# Patient Record
Sex: FEMALE | Race: WHITE | Employment: FULL TIME | URBAN - METROPOLITAN AREA
[De-identification: names, ages, dates, MRNs, and addresses within clinical notes are randomized per-mention and may not be internally consistent; named-entity substitution may affect disease eponyms.]

---

## 2017-07-21 LAB
ALBUMIN SERPL-MCNC: 4.4 G/DL
ALP BLD-CCNC: 37 U/L
ALT SERPL-CCNC: 14 U/L
ANION GAP SERPL CALCULATED.3IONS-SCNC: 1.6 MMOL/L
AST SERPL-CCNC: 16 U/L
AVERAGE GLUCOSE: NORMAL
BASOPHILS ABSOLUTE: 20 /ΜL
BASOPHILS RELATIVE PERCENT: 0 %
BILIRUB SERPL-MCNC: 0.6 MG/DL (ref 0.1–1.4)
BILIRUBIN, URINE: NORMAL
BLOOD, URINE: NORMAL
BUN BLDV-MCNC: 19 MG/DL
CALCIUM SERPL-MCNC: 9.5 MG/DL
CHLORIDE BLD-SCNC: 97 MMOL/L
CHOLESTEROL, TOTAL: 186 MG/DL
CHOLESTEROL/HDL RATIO: 3.2
CLARITY: NORMAL
CO2: 29 MMOL/L
COLOR: NORMAL
CREAT SERPL-MCNC: 1.16 MG/DL
EOSINOPHILS ABSOLUTE: 100 /ΜL
EOSINOPHILS RELATIVE PERCENT: 2 %
GFR CALCULATED: NORMAL
GLUCOSE BLD-MCNC: 81 MG/DL
GLUCOSE URINE: NORMAL
HBA1C MFR BLD: 5.2 %
HCT VFR BLD CALC: 40.9 % (ref 36–46)
HDLC SERPL-MCNC: 58 MG/DL (ref 35–70)
HEMOGLOBIN: 13.2 G/DL (ref 12–16)
KETONES, URINE: NORMAL
LDL CHOLESTEROL CALCULATED: 100 MG/DL (ref 0–160)
LEUKOCYTE ESTERASE, URINE: NORMAL
LYMPHOCYTES ABSOLUTE: 1370 /ΜL
LYMPHOCYTES RELATIVE PERCENT: 29 %
MCH RBC QN AUTO: 29.7 PG
MCHC RBC AUTO-ENTMCNC: 32.2 G/DL
MCV RBC AUTO: 92.2 FL
MONOCYTES ABSOLUTE: 390 /ΜL
MONOCYTES RELATIVE PERCENT: 8 %
NEUTROPHILS ABSOLUTE: 2780 /ΜL
NEUTROPHILS RELATIVE PERCENT: 60 %
NITRITE, URINE: NORMAL
PDW BLD-RTO: 15.1 %
PH UA: NORMAL (ref 4.5–8)
PLATELET # BLD: 181 K/ΜL
PMV BLD AUTO: 9.5 FL
POTASSIUM SERPL-SCNC: 4.1 MMOL/L
PROTEIN UA: NORMAL
RBC # BLD: 4.43 10^6/ΜL
SODIUM BLD-SCNC: 133 MMOL/L
SPECIFIC GRAVITY, URINE: NORMAL
TOTAL PROTEIN: 7
TRIGL SERPL-MCNC: 142 MG/DL
TSH SERPL DL<=0.05 MIU/L-ACNC: 3.63 UIU/ML
URIC ACID: 4.5
UROBILINOGEN, URINE: NORMAL
VITAMIN B-12: 1781
VITAMIN D 25-HYDROXY: NORMAL
VITAMIN D2, 25 HYDROXY: NORMAL
VITAMIN D3,25 HYDROXY: NORMAL
VLDLC SERPL CALC-MCNC: NORMAL MG/DL
WBC # BLD: 4.7 10^3/ML

## 2018-03-23 ENCOUNTER — HOSPITAL ENCOUNTER (OUTPATIENT)
Dept: GENERAL RADIOLOGY | Age: 53
Discharge: HOME | End: 2018-03-25
Payer: COMMERCIAL

## 2018-03-23 DIAGNOSIS — Z85.3 PERSONAL HISTORY OF BREAST CANCER: ICD-10-CM

## 2018-03-23 PROCEDURE — 77066 DX MAMMO INCL CAD BI: CPT

## 2018-04-04 RX ORDER — CITALOPRAM 20 MG/1
20 TABLET ORAL DAILY
Qty: 45 TABLET | Refills: 0 | Status: SHIPPED | OUTPATIENT
Start: 2018-04-04 | End: 2018-05-07 | Stop reason: SDUPTHER

## 2018-05-07 RX ORDER — CITALOPRAM 20 MG/1
20 TABLET ORAL DAILY
Qty: 45 TABLET | Refills: 0 | Status: SHIPPED | OUTPATIENT
Start: 2018-05-07 | End: 2018-06-07 | Stop reason: SDUPTHER

## 2018-06-07 RX ORDER — CITALOPRAM 20 MG/1
TABLET ORAL
Qty: 45 TABLET | Refills: 1 | Status: SHIPPED | OUTPATIENT
Start: 2018-06-07 | End: 2018-08-02 | Stop reason: SDUPTHER

## 2018-06-14 DIAGNOSIS — E78.5 HYPERLIPIDEMIA, UNSPECIFIED HYPERLIPIDEMIA TYPE: ICD-10-CM

## 2018-06-14 DIAGNOSIS — E03.9 ACQUIRED HYPOTHYROIDISM: ICD-10-CM

## 2018-06-14 DIAGNOSIS — Z00.00 ROUTINE GENERAL MEDICAL EXAMINATION AT A HEALTH CARE FACILITY: ICD-10-CM

## 2018-06-14 DIAGNOSIS — E55.9 VITAMIN D DEFICIENCY: ICD-10-CM

## 2018-06-14 DIAGNOSIS — R53.83 FATIGUE, UNSPECIFIED TYPE: Primary | ICD-10-CM

## 2018-06-14 DIAGNOSIS — Z79.899 ENCOUNTER FOR LONG-TERM (CURRENT) USE OF MEDICATIONS: ICD-10-CM

## 2018-06-14 DIAGNOSIS — Z85.3 PERSONAL HISTORY OF MALIGNANT NEOPLASM OF BREAST: ICD-10-CM

## 2018-07-13 ENCOUNTER — TELEPHONE (OUTPATIENT)
Dept: PRIMARY CARE CLINIC | Age: 53
End: 2018-07-13

## 2018-07-13 NOTE — TELEPHONE ENCOUNTER
Patient requesting her lab order be faxed to [facility] at [phone].   Request was successfully faxed today at 8:28am

## 2018-07-17 DIAGNOSIS — M79.18 MYOFACIAL MUSCLE PAIN: ICD-10-CM

## 2018-07-17 LAB
ALBUMIN SERPL-MCNC: 4.3 G/DL
ALP BLD-CCNC: 58 U/L
ALT SERPL-CCNC: 22 U/L
ANION GAP SERPL CALCULATED.3IONS-SCNC: NORMAL MMOL/L
AST SERPL-CCNC: 23 U/L
AVERAGE GLUCOSE: NORMAL
BASOPHILS ABSOLUTE: 0 /ΜL
BASOPHILS RELATIVE PERCENT: 1 %
BILIRUB SERPL-MCNC: 0.4 MG/DL (ref 0.1–1.4)
BILIRUBIN, URINE: NEGATIVE
BLOOD, URINE: NEGATIVE
BUN BLDV-MCNC: 16 MG/DL
CALCIUM SERPL-MCNC: 9.5 MG/DL
CHLORIDE BLD-SCNC: 103 MMOL/L
CHOLESTEROL, TOTAL: 196 MG/DL
CHOLESTEROL/HDL RATIO: NORMAL
CLARITY: CLEAR
CO2: 28 MMOL/L
COLOR: YELLOW
CREAT SERPL-MCNC: 1.08 MG/DL
EOSINOPHILS ABSOLUTE: 0.1 /ΜL
EOSINOPHILS RELATIVE PERCENT: 3 %
GFR CALCULATED: NORMAL
GLUCOSE BLD-MCNC: 87 MG/DL
GLUCOSE URINE: NEGATIVE
HBA1C MFR BLD: 5.4 %
HCT VFR BLD CALC: 40 % (ref 36–46)
HDLC SERPL-MCNC: 52 MG/DL (ref 35–70)
HEMOGLOBIN: 13.1 G/DL (ref 12–16)
KETONES, URINE: NEGATIVE
LDL CHOLESTEROL CALCULATED: 121 MG/DL (ref 0–160)
LEUKOCYTE ESTERASE, URINE: NEGATIVE
LYMPHOCYTES ABSOLUTE: 1.3 /ΜL
LYMPHOCYTES RELATIVE PERCENT: 33 %
MCH RBC QN AUTO: 29.2 PG
MCHC RBC AUTO-ENTMCNC: 32.8 G/DL
MCV RBC AUTO: 89 FL
MONOCYTES ABSOLUTE: 0.3 /ΜL
MONOCYTES RELATIVE PERCENT: 8 %
NEUTROPHILS ABSOLUTE: 2.2 /ΜL
NEUTROPHILS RELATIVE PERCENT: 55 %
NITRITE, URINE: NEGATIVE
PDW BLD-RTO: 14 %
PH UA: 7 (ref 4.5–8)
PLATELET # BLD: 227 K/ΜL
PMV BLD AUTO: NORMAL FL
POTASSIUM SERPL-SCNC: 4.5 MMOL/L
PROTEIN UA: NEGATIVE
RBC # BLD: 4.49 10^6/ΜL
SODIUM BLD-SCNC: 143 MMOL/L
SPECIFIC GRAVITY, URINE: 1.02
TOTAL PROTEIN: 6.9
TRIGL SERPL-MCNC: 117 MG/DL
TSH SERPL DL<=0.05 MIU/L-ACNC: 3 UIU/ML
URIC ACID: 3.9
UROBILINOGEN, URINE: NORMAL
VLDLC SERPL CALC-MCNC: 23 MG/DL
WBC # BLD: 4 10^3/ML

## 2018-07-26 ENCOUNTER — OFFICE VISIT (OUTPATIENT)
Dept: PRIMARY CARE CLINIC | Age: 53
End: 2018-07-26
Payer: COMMERCIAL

## 2018-07-26 VITALS
DIASTOLIC BLOOD PRESSURE: 66 MMHG | HEART RATE: 70 BPM | RESPIRATION RATE: 18 BRPM | OXYGEN SATURATION: 97 % | BODY MASS INDEX: 24.11 KG/M2 | HEIGHT: 66 IN | WEIGHT: 150 LBS | SYSTOLIC BLOOD PRESSURE: 98 MMHG | TEMPERATURE: 98.7 F

## 2018-07-26 DIAGNOSIS — E55.9 VITAMIN D DEFICIENCY: ICD-10-CM

## 2018-07-26 DIAGNOSIS — C50.912 MALIGNANT NEOPLASM OF LEFT BREAST IN FEMALE, ESTROGEN RECEPTOR POSITIVE, UNSPECIFIED SITE OF BREAST (HCC): Primary | ICD-10-CM

## 2018-07-26 DIAGNOSIS — E03.9 ACQUIRED HYPOTHYROIDISM: ICD-10-CM

## 2018-07-26 DIAGNOSIS — E78.5 HYPERLIPIDEMIA, UNSPECIFIED HYPERLIPIDEMIA TYPE: ICD-10-CM

## 2018-07-26 DIAGNOSIS — Z85.3 PERSONAL HISTORY OF MALIGNANT NEOPLASM OF BREAST: ICD-10-CM

## 2018-07-26 DIAGNOSIS — Z00.00 ROUTINE GENERAL MEDICAL EXAMINATION AT A HEALTH CARE FACILITY: ICD-10-CM

## 2018-07-26 DIAGNOSIS — E78.00 PURE HYPERCHOLESTEROLEMIA: ICD-10-CM

## 2018-07-26 DIAGNOSIS — R53.83 FATIGUE, UNSPECIFIED TYPE: ICD-10-CM

## 2018-07-26 DIAGNOSIS — Z79.899 ENCOUNTER FOR LONG-TERM (CURRENT) USE OF MEDICATIONS: ICD-10-CM

## 2018-07-26 DIAGNOSIS — M79.18 MYOFACIAL MUSCLE PAIN: ICD-10-CM

## 2018-07-26 DIAGNOSIS — K57.30 DIVERTICULOSIS OF LARGE INTESTINE WITHOUT HEMORRHAGE: ICD-10-CM

## 2018-07-26 DIAGNOSIS — Z17.0 MALIGNANT NEOPLASM OF LEFT BREAST IN FEMALE, ESTROGEN RECEPTOR POSITIVE, UNSPECIFIED SITE OF BREAST (HCC): Primary | ICD-10-CM

## 2018-07-26 PROCEDURE — 99396 PREV VISIT EST AGE 40-64: CPT | Performed by: INTERNAL MEDICINE

## 2018-07-26 RX ORDER — ANASTROZOLE 1 MG/1
1 TABLET ORAL DAILY
COMMUNITY
End: 2021-10-07

## 2018-07-26 ASSESSMENT — ENCOUNTER SYMPTOMS
STRIDOR: 0
EYE REDNESS: 0
EYE PAIN: 0
DIARRHEA: 0
BLURRED VISION: 0
HEMOPTYSIS: 0
SHORTNESS OF BREATH: 0
DOUBLE VISION: 0
NAUSEA: 0
BLOOD IN STOOL: 0

## 2018-07-26 ASSESSMENT — PATIENT HEALTH QUESTIONNAIRE - PHQ9
SUM OF ALL RESPONSES TO PHQ QUESTIONS 1-9: 0
1. LITTLE INTEREST OR PLEASURE IN DOING THINGS: 0
SUM OF ALL RESPONSES TO PHQ9 QUESTIONS 1 & 2: 0
2. FEELING DOWN, DEPRESSED OR HOPELESS: 0

## 2018-07-26 NOTE — PROGRESS NOTES
studies    Her concern over lipids was discussed she chooses not to use a statin at the present time. We'll check lipid profile and TSH only in 6 months    We'll check complete laboratory workup including lipids and thyroid CMP urinalysis vitamin D in 1 year's time    She'll continue to follow up with consultants noted. Immunizations reviewed patient will have these at the pharmacy      Moderate to high degree of medical decision-making as necessary injury with this patient's questions, or problems, her history, and the physicians that she engages with.

## 2018-07-27 RX ORDER — LEVOTHYROXINE SODIUM 0.05 MG/1
50 TABLET ORAL DAILY
Qty: 90 TABLET | Refills: 3 | Status: SHIPPED | OUTPATIENT
Start: 2018-07-27 | End: 2019-07-25 | Stop reason: SDUPTHER

## 2018-08-02 RX ORDER — CITALOPRAM 20 MG/1
TABLET ORAL
Qty: 45 TABLET | Refills: 1 | Status: SHIPPED | OUTPATIENT
Start: 2018-08-02 | End: 2018-10-04 | Stop reason: SDUPTHER

## 2018-10-04 RX ORDER — CITALOPRAM 20 MG/1
TABLET ORAL
Qty: 45 TABLET | Refills: 1 | Status: SHIPPED | OUTPATIENT
Start: 2018-10-04 | End: 2018-12-03 | Stop reason: SDUPTHER

## 2018-12-03 RX ORDER — CITALOPRAM 20 MG/1
TABLET ORAL
Qty: 45 TABLET | Refills: 3 | Status: SHIPPED | OUTPATIENT
Start: 2018-12-03 | End: 2019-06-05 | Stop reason: SDUPTHER

## 2019-01-30 DIAGNOSIS — E03.9 ACQUIRED HYPOTHYROIDISM: ICD-10-CM

## 2019-01-30 DIAGNOSIS — E78.00 PURE HYPERCHOLESTEROLEMIA: ICD-10-CM

## 2019-01-30 LAB
CHOLESTEROL, TOTAL: 215 MG/DL
CHOLESTEROL/HDL RATIO: NORMAL
HDLC SERPL-MCNC: 51 MG/DL (ref 35–70)
LDL CHOLESTEROL CALCULATED: 135 MG/DL (ref 0–160)
TRIGL SERPL-MCNC: 144 MG/DL
TSH SERPL DL<=0.05 MIU/L-ACNC: 2.86 UIU/ML
VLDLC SERPL CALC-MCNC: 29 MG/DL

## 2019-03-25 ENCOUNTER — HOSPITAL ENCOUNTER (OUTPATIENT)
Dept: GENERAL RADIOLOGY | Age: 54
Discharge: HOME | End: 2019-03-27
Payer: COMMERCIAL

## 2019-03-25 DIAGNOSIS — Z13.9 VISIT FOR SCREENING: ICD-10-CM

## 2019-03-25 DIAGNOSIS — Z13.820 SCREENING FOR OSTEOPOROSIS: ICD-10-CM

## 2019-03-25 PROCEDURE — 77080 DXA BONE DENSITY AXIAL: CPT

## 2019-03-25 PROCEDURE — 77063 BREAST TOMOSYNTHESIS BI: CPT

## 2019-06-02 ENCOUNTER — HOSPITAL ENCOUNTER (EMERGENCY)
Age: 54
Discharge: HOME | End: 2019-06-02
Payer: COMMERCIAL

## 2019-06-02 VITALS
DIASTOLIC BLOOD PRESSURE: 65 MMHG | RESPIRATION RATE: 16 BRPM | TEMPERATURE: 98.7 F | SYSTOLIC BLOOD PRESSURE: 121 MMHG | OXYGEN SATURATION: 94 % | HEART RATE: 63 BPM | HEIGHT: 65 IN | BODY MASS INDEX: 23.32 KG/M2 | WEIGHT: 140 LBS

## 2019-06-02 DIAGNOSIS — Z23 NEED FOR TETANUS BOOSTER: Primary | ICD-10-CM

## 2019-06-02 DIAGNOSIS — S81.012A LACERATION OF LEFT KNEE, INITIAL ENCOUNTER: ICD-10-CM

## 2019-06-02 PROCEDURE — 12002 RPR S/N/AX/GEN/TRNK2.6-7.5CM: CPT

## 2019-06-02 PROCEDURE — 6360000002 HC RX W HCPCS: Performed by: PHYSICIAN ASSISTANT

## 2019-06-02 PROCEDURE — 99282 EMERGENCY DEPT VISIT SF MDM: CPT

## 2019-06-02 PROCEDURE — 6370000000 HC RX 637 (ALT 250 FOR IP): Performed by: PHYSICIAN ASSISTANT

## 2019-06-02 PROCEDURE — 90471 IMMUNIZATION ADMIN: CPT | Performed by: PHYSICIAN ASSISTANT

## 2019-06-02 PROCEDURE — 90715 TDAP VACCINE 7 YRS/> IM: CPT | Performed by: PHYSICIAN ASSISTANT

## 2019-06-02 RX ORDER — GINSENG 100 MG
CAPSULE ORAL ONCE
Status: COMPLETED | OUTPATIENT
Start: 2019-06-02 | End: 2019-06-02

## 2019-06-02 RX ADMIN — TETANUS TOXOID, REDUCED DIPHTHERIA TOXOID AND ACELLULAR PERTUSSIS VACCINE, ADSORBED 0.5 ML: 5; 2.5; 8; 8; 2.5 SUSPENSION INTRAMUSCULAR at 16:42

## 2019-06-02 RX ADMIN — BACITRACIN: 500 OINTMENT TOPICAL at 16:43

## 2019-06-02 NOTE — ED PROVIDER NOTES
Aligned: yes  The wound was closed with 4-0 Prolene using interrupted sutures. Dressing:  bacitracin and a bandage was placed. Total number suture:  4. There were no additional lacerations requiring repair. MDM:   Patient presenting for laceration to her left knee that she sustained just prior to arrival. Wound closed nicely with 4 stitches. There were superficial lacerations to the lateral sides of her closed with adhesive. We discussed proper wound care. We discussed topical antibiotic ointment and prevention of infection. We discussed signs of infection and reasons to return. They understood to have the sutures removed in 7-10 days. Counseling: The emergency provider has spoken with the patient and discussed todays results, in addition to providing specific details for the plan of care and counseling regarding the diagnosis and prognosis. Questions are answered at this time and they are agreeable with the plan. Assessment      1. Need for tetanus booster    2. Laceration of left knee, initial encounter      Plan   Discharge to home  Patient condition is stable    New Medications     Discharge Medication List as of 6/2/2019  4:32 PM        Electronically signed by Dionicio Dean PA-C   DD: 6/2/19  **This report was transcribed using voice recognition software. Every effort was made to ensure accuracy; however, inadvertent computerized transcription errors may be present.   END OF ED PROVIDER NOTE        Kwasi Martin PA-C  06/02/19 [id number]

## 2019-06-05 RX ORDER — CITALOPRAM 20 MG/1
TABLET ORAL
Qty: 45 TABLET | Refills: 1 | Status: SHIPPED | OUTPATIENT
Start: 2019-06-05 | End: 2019-07-25 | Stop reason: SDUPTHER

## 2019-07-25 ENCOUNTER — OFFICE VISIT (OUTPATIENT)
Dept: PRIMARY CARE CLINIC | Age: 54
End: 2019-07-25
Payer: COMMERCIAL

## 2019-07-25 VITALS
SYSTOLIC BLOOD PRESSURE: 122 MMHG | HEART RATE: 90 BPM | OXYGEN SATURATION: 99 % | HEIGHT: 65 IN | DIASTOLIC BLOOD PRESSURE: 70 MMHG | WEIGHT: 155 LBS | BODY MASS INDEX: 25.83 KG/M2 | RESPIRATION RATE: 16 BRPM | TEMPERATURE: 98.7 F

## 2019-07-25 DIAGNOSIS — E03.9 ACQUIRED HYPOTHYROIDISM: ICD-10-CM

## 2019-07-25 DIAGNOSIS — E55.9 VITAMIN D DEFICIENCY: ICD-10-CM

## 2019-07-25 DIAGNOSIS — E78.00 PURE HYPERCHOLESTEROLEMIA: ICD-10-CM

## 2019-07-25 DIAGNOSIS — Z17.0 MALIGNANT NEOPLASM OF LEFT BREAST IN FEMALE, ESTROGEN RECEPTOR POSITIVE, UNSPECIFIED SITE OF BREAST (HCC): Primary | ICD-10-CM

## 2019-07-25 DIAGNOSIS — C50.912 MALIGNANT NEOPLASM OF LEFT BREAST IN FEMALE, ESTROGEN RECEPTOR POSITIVE, UNSPECIFIED SITE OF BREAST (HCC): Primary | ICD-10-CM

## 2019-07-25 DIAGNOSIS — K57.30 DIVERTICULOSIS OF LARGE INTESTINE WITHOUT HEMORRHAGE: ICD-10-CM

## 2019-07-25 DIAGNOSIS — F34.1 DYSTHYMIA: ICD-10-CM

## 2019-07-25 PROCEDURE — 99214 OFFICE O/P EST MOD 30 MIN: CPT | Performed by: FAMILY MEDICINE

## 2019-07-25 PROCEDURE — 3008F BODY MASS INDEX DOCD: CPT | Performed by: FAMILY MEDICINE

## 2019-07-25 RX ORDER — CITALOPRAM 20 MG/1
40 TABLET ORAL DAILY
Qty: 60 TABLET | Refills: 2 | Status: SHIPPED | OUTPATIENT
Start: 2019-07-25 | End: 2019-10-30 | Stop reason: SDUPTHER

## 2019-07-25 RX ORDER — LEVOTHYROXINE SODIUM 0.05 MG/1
50 TABLET ORAL DAILY
Qty: 90 TABLET | Refills: 3 | Status: SHIPPED
Start: 2019-07-25 | End: 2020-07-27

## 2019-07-25 ASSESSMENT — ENCOUNTER SYMPTOMS
NAUSEA: 0
CONSTIPATION: 0
SHORTNESS OF BREATH: 0
WHEEZING: 0
RHINORRHEA: 0
VOMITING: 0
DIARRHEA: 0
SORE THROAT: 0
ABDOMINAL PAIN: 0

## 2019-07-25 NOTE — PROGRESS NOTES
rhythm and normal heart sounds. No murmur heard. Pulmonary/Chest: Effort normal and breath sounds normal. She has no wheezes. She has no rales. Abdominal: Soft. Bowel sounds are normal. There is no tenderness. Neurological: She is alert. Cranial nerves grossly intact   Psychiatric: She has a normal mood and affect. ASSESSMENT/PLAN:  Tabitha Payton was seen today for hypothyroidism. Diagnoses and all orders for this visit:    Malignant neoplasm of left breast in female, estrogen receptor positive, unspecified site of breast ([facility] [address]  -     CBC; Future  -     Comprehensive Metabolic Panel; Future  -     Hemoglobin A1C; Future  -     Lipid Panel; Future  -     TSH without Reflex; Future  -     Vitamin D 25 Hydroxy; Future  Stable. Patient to continue to follow with oncology. Routine cancer screening as noted above. Acquired hypothyroidism  -     Hemoglobin A1C; Future  -     TSH without Reflex; Future  -     levothyroxine (SYNTHROID) 50 MCG tablet; Take 1 tablet by mouth daily  Continue current dosage until TSH results. Diverticulosis of large intestine without hemorrhage  Patient is to have a repeat colonoscopy in approximately one year. Pure hypercholesterolemia  -     TSH without Reflex; Future  Repeat lipid panel. Vitamin D deficiency  -     Vitamin D 25 Hydroxy; Future    Dysthymia  -     CBC; Future  -     Comprehensive Metabolic Panel; Future  -     Hemoglobin A1C; Future  -     Lipid Panel; Future  -     TSH without Reflex; Future  -     Vitamin D 25 Hydroxy; Future  -     citalopram (CELEXA) 20 MG tablet; Take 2 tablets by mouth daily  Patient was advised on lifestyle modifications and mind-body techniques for stress relief. Patient will have her citalopram increased to 40 mg daily. Patient denies any SI/HI. Patient was advised that if such develop she should call 911, the crisis hotline, or proceed immediately to the emergency department.     Patient will contact us in approximately 2

## 2019-07-26 LAB
ALBUMIN SERPL-MCNC: NORMAL G/DL
ALP BLD-CCNC: NORMAL U/L
ALT SERPL-CCNC: NORMAL U/L
ANION GAP SERPL CALCULATED.3IONS-SCNC: NORMAL MMOL/L
AST SERPL-CCNC: NORMAL U/L
AVERAGE GLUCOSE: NORMAL
BASOPHILS ABSOLUTE: NORMAL /ΜL
BASOPHILS RELATIVE PERCENT: NORMAL %
BILIRUB SERPL-MCNC: NORMAL MG/DL (ref 0.1–1.4)
BUN BLDV-MCNC: NORMAL MG/DL
CALCIUM SERPL-MCNC: NORMAL MG/DL
CHLORIDE BLD-SCNC: NORMAL MMOL/L
CHOLESTEROL, TOTAL: 180 MG/DL
CHOLESTEROL/HDL RATIO: ABNORMAL
CO2: NORMAL MMOL/L
CREAT SERPL-MCNC: NORMAL MG/DL
EOSINOPHILS ABSOLUTE: NORMAL /ΜL
EOSINOPHILS RELATIVE PERCENT: NORMAL %
GFR CALCULATED: NORMAL
GLUCOSE BLD-MCNC: NORMAL MG/DL
HBA1C MFR BLD: 5.4 %
HCT VFR BLD CALC: NORMAL % (ref 36–46)
HDLC SERPL-MCNC: 47 MG/DL (ref 35–70)
HEMOGLOBIN: NORMAL G/DL (ref 12–16)
LDL CHOLESTEROL CALCULATED: 103 MG/DL (ref 0–160)
LYMPHOCYTES ABSOLUTE: NORMAL /ΜL
LYMPHOCYTES RELATIVE PERCENT: NORMAL %
MCH RBC QN AUTO: NORMAL PG
MCHC RBC AUTO-ENTMCNC: NORMAL G/DL
MCV RBC AUTO: NORMAL FL
MONOCYTES ABSOLUTE: NORMAL /ΜL
MONOCYTES RELATIVE PERCENT: NORMAL %
NEUTROPHILS ABSOLUTE: NORMAL /ΜL
NEUTROPHILS RELATIVE PERCENT: NORMAL %
PLATELET # BLD: NORMAL K/ΜL
PMV BLD AUTO: NORMAL FL
POTASSIUM SERPL-SCNC: NORMAL MMOL/L
RBC # BLD: NORMAL 10^6/ΜL
SODIUM BLD-SCNC: NORMAL MMOL/L
TOTAL PROTEIN: NORMAL
TRIGL SERPL-MCNC: 150 MG/DL
TSH SERPL DL<=0.05 MIU/L-ACNC: NORMAL UIU/ML
VITAMIN D 25-HYDROXY: NORMAL
VITAMIN D2, 25 HYDROXY: NORMAL
VITAMIN D3,25 HYDROXY: NORMAL
VLDLC SERPL CALC-MCNC: 30 MG/DL
WBC # BLD: NORMAL 10^3/ML

## 2019-08-21 DIAGNOSIS — E55.9 VITAMIN D DEFICIENCY: ICD-10-CM

## 2019-08-21 DIAGNOSIS — E78.00 PURE HYPERCHOLESTEROLEMIA: ICD-10-CM

## 2019-08-21 DIAGNOSIS — F34.1 DYSTHYMIA: ICD-10-CM

## 2019-08-21 DIAGNOSIS — Z17.0 MALIGNANT NEOPLASM OF LEFT BREAST IN FEMALE, ESTROGEN RECEPTOR POSITIVE, UNSPECIFIED SITE OF BREAST (HCC): ICD-10-CM

## 2019-08-21 DIAGNOSIS — E03.9 ACQUIRED HYPOTHYROIDISM: ICD-10-CM

## 2019-08-21 DIAGNOSIS — C50.912 MALIGNANT NEOPLASM OF LEFT BREAST IN FEMALE, ESTROGEN RECEPTOR POSITIVE, UNSPECIFIED SITE OF BREAST (HCC): ICD-10-CM

## 2019-10-30 DIAGNOSIS — F34.1 DYSTHYMIA: ICD-10-CM

## 2019-10-30 RX ORDER — CITALOPRAM 20 MG/1
40 TABLET ORAL DAILY
Qty: 60 TABLET | Refills: 2 | Status: SHIPPED | OUTPATIENT
Start: 2019-10-30 | End: 2020-01-29 | Stop reason: ALTCHOICE

## 2019-10-31 ENCOUNTER — OFFICE VISIT (OUTPATIENT)
Dept: PRIMARY CARE CLINIC | Age: 54
End: 2019-10-31
Payer: COMMERCIAL

## 2019-10-31 VITALS
BODY MASS INDEX: 26.66 KG/M2 | WEIGHT: 160 LBS | RESPIRATION RATE: 14 BRPM | HEART RATE: 88 BPM | SYSTOLIC BLOOD PRESSURE: 134 MMHG | OXYGEN SATURATION: 98 % | DIASTOLIC BLOOD PRESSURE: 80 MMHG | TEMPERATURE: 98.6 F | HEIGHT: 65 IN

## 2019-10-31 DIAGNOSIS — E78.00 PURE HYPERCHOLESTEROLEMIA: ICD-10-CM

## 2019-10-31 DIAGNOSIS — F34.1 DYSTHYMIA: ICD-10-CM

## 2019-10-31 DIAGNOSIS — E55.9 VITAMIN D DEFICIENCY: ICD-10-CM

## 2019-10-31 DIAGNOSIS — E03.9 ACQUIRED HYPOTHYROIDISM: ICD-10-CM

## 2019-10-31 DIAGNOSIS — Z23 NEEDS FLU SHOT: Primary | ICD-10-CM

## 2019-10-31 PROCEDURE — 90686 IIV4 VACC NO PRSV 0.5 ML IM: CPT | Performed by: FAMILY MEDICINE

## 2019-10-31 PROCEDURE — 90471 IMMUNIZATION ADMIN: CPT | Performed by: FAMILY MEDICINE

## 2019-10-31 PROCEDURE — 99214 OFFICE O/P EST MOD 30 MIN: CPT | Performed by: FAMILY MEDICINE

## 2019-10-31 ASSESSMENT — ENCOUNTER SYMPTOMS
SORE THROAT: 0
CONSTIPATION: 0
ABDOMINAL PAIN: 0
RHINORRHEA: 0
DIARRHEA: 0
NAUSEA: 0
WHEEZING: 0
VOMITING: 0
SHORTNESS OF BREATH: 0

## 2020-01-29 RX ORDER — CITALOPRAM 40 MG/1
40 TABLET ORAL DAILY
Qty: 90 TABLET | Refills: 1 | Status: SHIPPED
Start: 2020-01-29 | End: 2020-03-11 | Stop reason: SDUPTHER

## 2020-01-29 RX ORDER — CITALOPRAM 40 MG/1
40 TABLET ORAL DAILY
Qty: 30 TABLET | Refills: 3 | Status: CANCELLED | OUTPATIENT
Start: 2020-01-29

## 2020-01-29 RX ORDER — CITALOPRAM 40 MG/1
40 TABLET ORAL DAILY
COMMUNITY
End: 2020-01-29 | Stop reason: SDUPTHER

## 2020-01-29 NOTE — TELEPHONE ENCOUNTER
Name of Medication(s) Requested:  Citalopram 40 mg    Pharmacy Requested:   [id number]    Medication(s) pended? [x] Yes  [] No    Last Appointment:  10/31/2019    Future appts:  Future Appointments   Date Time Provider Nico Best   4/27/2020  4:15 PM MD [facility]   7/29/2020  8:00 AM MD [facility]        Does patient need call back?   [] Yes  [x] No

## 2020-02-23 ENCOUNTER — OFFICE VISIT (OUTPATIENT)
Dept: FAMILY MEDICINE CLINIC | Age: 55
End: 2020-02-23
Payer: COMMERCIAL

## 2020-02-23 VITALS
HEART RATE: 68 BPM | TEMPERATURE: 98.9 F | SYSTOLIC BLOOD PRESSURE: 128 MMHG | WEIGHT: 158 LBS | RESPIRATION RATE: 18 BRPM | BODY MASS INDEX: 25.39 KG/M2 | DIASTOLIC BLOOD PRESSURE: 80 MMHG | HEIGHT: 66 IN | OXYGEN SATURATION: 96 %

## 2020-02-23 PROCEDURE — 99213 OFFICE O/P EST LOW 20 MIN: CPT | Performed by: PHYSICIAN ASSISTANT

## 2020-02-23 RX ORDER — CEFDINIR 300 MG/1
300 CAPSULE ORAL 2 TIMES DAILY
Qty: 20 CAPSULE | Refills: 0 | Status: SHIPPED | OUTPATIENT
Start: 2020-02-23 | End: 2020-03-04

## 2020-02-23 RX ORDER — METHYLPREDNISOLONE 4 MG/1
TABLET ORAL
Qty: 1 KIT | Refills: 0 | Status: SHIPPED
Start: 2020-02-23 | End: 2020-03-18 | Stop reason: ALTCHOICE

## 2020-02-23 NOTE — PROGRESS NOTES
20  Rahat Gabriel : 1965 Sex: female  Age 60 y.o. Subjective:  Chief Complaint   Patient presents with    Cough    Ear Fullness     bilateral         HPI:   Rahat Gabriel , 60 y.o. female presents to express care for evaluation of cough, bilateral ear fullness, no chest pain, shortness of breath, abdominal pain. Patient has had the symptoms since Friday.  has bronchitis. The patient is on any chest pain, shortness of breath, abdominal pain. No lightheadedness or dizziness. Patient is not currently on any antibiotics. She has been using over-the-counter medications without any significant improvement. ROS:   Unless otherwise stated in this report the patient's positive and negative responses for review of systems for constitutional, eyes, ENT, cardiovascular, respiratory, gastrointestinal, neurological, , musculoskeletal, and integument systems and related systems to the presenting problem are either stated in the history of present illness or were not pertinent or were negative for the symptoms and/or complaints related to the presenting medical problem. Positives and pertinent negatives as per HPI. All others reviewed and are negative.       PMH:     Past Medical History:   Diagnosis Date    Anxiety     Blood transfusion         [facility] [address]     Cancer ([facility] [address]     Diverticulosis     Dysthymia 2019    Generalized headaches     Hypothyroidism     Myofacial muscle pain     Nausea & vomiting     PONV (postoperative nausea and vomiting)     Pure hypercholesterolemia 2018    Thyroid disease        Past Surgical History:   Procedure Laterality Date    BREAST LUMPECTOMY      right breast    ECTOPIC PREGNANCY SURGERY         Family History   Problem Relation Age of Onset    Breast Cancer Mother     Heart Attack Mother     Breast Cancer Paternal Grandmother     Scoliosis Daughter     Heart Disease Sister        Medications: Current Outpatient Medications:     citalopram (CELEXA) 40 MG tablet, Take 1 tablet by mouth daily, Disp: 90 tablet, Rfl: 1    levothyroxine (SYNTHROID) 50 MCG tablet, Take 1 tablet by mouth daily, Disp: 90 tablet, Rfl: 3    anastrozole (ARIMIDEX) 1 MG tablet, Take 1 mg by mouth daily, Disp: , Rfl:     Multiple Vitamins-Minerals (THERAPEUTIC MULTIVITAMIN-MINERALS) tablet, Take 1 tablet by mouth daily, Disp: , Rfl:     calcium carbonate 600 MG TABS tablet, Take 1 tablet by mouth 2 times daily, Disp: , Rfl:     tamoxifen (NOLVADEX) 10 MG tablet, Take 20 mg by mouth daily. , Disp: , Rfl:     Allergies:   No Known Allergies    Social History:     Social History     Tobacco Use    Smoking status: Never Smoker    Smokeless tobacco: Never Used   Substance Use Topics    Alcohol use: Yes     Alcohol/week: 2.0 standard drinks     Types: 2 Glasses of wine per week     Comment: moderatly    Drug use: No       Patient lives at home. Physical Exam:     Vitals:    02/23/20 0811   BP: 128/80   Pulse: 68   Resp: 18   Temp: 98.9 °F (37.2 °C)   TempSrc: Oral   SpO2: 96%   Weight: 158 lb (71.7 kg)   Height: 5' 5.5\" (1.664 m)       Exam:  Physical Exam  Nurse's notes and vital signs reviewed. The patient is not hypoxic. ? General: Alert, no acute distress, patient resting comfortably Patient is not toxic or lethargic. Skin: Warm, intact, no pallor noted. There is no evidence of rash at this time. Head: Normocephalic, atraumatic  Eye: Normal conjunctiva  Ears, Nose, Throat: Right tympanic membrane clear, left tympanic membrane clear. No drainage or discharge noted. No pre- or post-auricular tenderness, erythema, or swelling noted. Moderate nasal congestion and rhinorrhea, no epistaxis  Posterior oropharynx shows no erythema, tonsillar hypertrophy, or exudate. the uvula is midline. No trismus or drooling is noted. Moist mucous membranes. Neck: No anterior/posterior lymphadenopathy noted.  No erythema, no masses, no

## 2020-03-11 ENCOUNTER — TELEPHONE (OUTPATIENT)
Dept: PRIMARY CARE CLINIC | Age: 55
End: 2020-03-11

## 2020-03-11 RX ORDER — CITALOPRAM 40 MG/1
40 TABLET ORAL DAILY
Qty: 90 TABLET | Refills: 1 | Status: SHIPPED
Start: 2020-03-11 | End: 2020-07-22 | Stop reason: ALTCHOICE

## 2020-03-11 NOTE — TELEPHONE ENCOUNTER
Pt has been receiving Citalopram 20mg tabs two times daily. Last prescription was sent in as one time daily. Pt is stating she is out of medication and it is too early to refill. Pt would like to be advised. Thank you.

## 2020-03-18 ENCOUNTER — OFFICE VISIT (OUTPATIENT)
Dept: PRIMARY CARE CLINIC | Age: 55
End: 2020-03-18
Payer: COMMERCIAL

## 2020-03-18 VITALS
SYSTOLIC BLOOD PRESSURE: 130 MMHG | RESPIRATION RATE: 16 BRPM | WEIGHT: 154 LBS | HEART RATE: 100 BPM | OXYGEN SATURATION: 99 % | BODY MASS INDEX: 25.66 KG/M2 | TEMPERATURE: 99 F | DIASTOLIC BLOOD PRESSURE: 74 MMHG | HEIGHT: 65 IN

## 2020-03-18 PROCEDURE — 99214 OFFICE O/P EST MOD 30 MIN: CPT | Performed by: FAMILY MEDICINE

## 2020-03-18 ASSESSMENT — ENCOUNTER SYMPTOMS
CONSTIPATION: 0
RHINORRHEA: 0
ABDOMINAL PAIN: 0
NAUSEA: 0
DIARRHEA: 0
VOMITING: 0
WHEEZING: 0
SORE THROAT: 0
SHORTNESS OF BREATH: 0

## 2020-03-18 NOTE — PROGRESS NOTES
3/18/2020     WALTER Bucio (:  1965) is a 60 y.o. female, here for evaluation of the following medical concerns:    Patient presents today for a recent notification that she was taking her Celexa 40 mg twice daily. Patient reports that after being contacted last week of the fact that she was taking dosages higher than the suggested maximum dosage she decreased her dosage to 40 mg daily. Patient reports that since this time she has felt no different in regards to her depression and anxiety. Patient also denies any signs or symptoms of withdrawal. Patient reports that she has been doing well in general. She has had some issues with her stress recently as she is currently in the process of moving into her mother's and father's house who recently passed away. Patient denies any SI/HI. Patient reports she has been compliant with all her medications. She is not interested in any modifications today. Patient does follow with ObGyn. Patient is due for a repeat colonoscopy. Patient did not have any colon polyps on previous colonoscopies. Review of Systems   Constitutional: Negative for chills and fever. HENT: Negative for congestion, rhinorrhea and sore throat. Respiratory: Negative for shortness of breath and wheezing. Cardiovascular: Negative for chest pain and leg swelling. Gastrointestinal: Negative for abdominal pain, constipation, diarrhea, nausea and vomiting. Skin: Negative for rash. Neurological: Negative for light-headedness and headaches. Past Medical History:   Diagnosis Date    Anxiety     Blood transfusion         Breast cancer ([facility] [address]     Cancer ([facility] [address]     Diverticulosis     Dysthymia 2019    Generalized headaches     Hypothyroidism     Myofacial muscle pain     Nausea & vomiting     PONV (postoperative nausea and vomiting)     Pure hypercholesterolemia 2018    Thyroid disease        Prior to Visit Medications    Medication Sig Taking? Authorizing Provider   citalopram (CELEXA) 40 MG tablet Take 1 tablet by mouth daily  Patient taking differently: Take 40 mg by mouth 2 times daily  Yes Perla Cha MD   levothyroxine (SYNTHROID) 50 MCG tablet Take 1 tablet by mouth daily Yes Perla Cha MD   anastrozole (ARIMIDEX) 1 MG tablet Take 1 mg by mouth daily Yes Historical Provider, MD   Multiple Vitamins-Minerals (THERAPEUTIC MULTIVITAMIN-MINERALS) tablet Take 1 tablet by mouth daily Yes Historical Provider, MD   calcium carbonate 600 MG TABS tablet Take 1 tablet by mouth 2 times daily Yes Historical Provider, MD        No Known Allergies    Social History     Tobacco Use    Smoking status: Never Smoker    Smokeless tobacco: Never Used   Substance Use Topics    Alcohol use: Yes     Alcohol/week: 2.0 standard drinks     Types: 2 Glasses of wine per week     Comment: moderatly           Vitals:    03/18/20 1458   BP: 130/74   Pulse: 100   Resp: 16   Temp: 99 °F (37.2 °C)   SpO2: 99%   Weight: 154 lb (69.9 kg)   Height: 5' 5\" (1.651 m)     Estimated body mass index is 25.63 kg/m² as calculated from the following:    Height as of this encounter: 5' 5\" (1.651 m). Weight as of this encounter: 154 lb (69.9 kg). Physical Exam  Constitutional:       Appearance: She is well-developed. HENT:      Head: Normocephalic. Eyes:      Conjunctiva/sclera: Conjunctivae normal.      Pupils: Pupils are equal, round, and reactive to light. Cardiovascular:      Rate and Rhythm: Normal rate and regular rhythm. Heart sounds: Normal heart sounds. No murmur. Pulmonary:      Effort: Pulmonary effort is normal.      Breath sounds: Normal breath sounds. No wheezing or rales. Abdominal:      General: Bowel sounds are normal.      Palpations: Abdomen is soft. Tenderness: There is no abdominal tenderness. Neurological:      Mental Status: She is alert.       Comments: Cranial nerves grossly intact         ASSESSMENT/PLAN:  Solo Terry was seen today

## 2020-03-21 ENCOUNTER — HOSPITAL ENCOUNTER (OUTPATIENT)
Age: 55
Setting detail: OBSERVATION
Discharge: HOME | End: 2020-03-24
Attending: EMERGENCY MEDICINE | Admitting: SURGERY
Payer: COMMERCIAL

## 2020-03-21 ENCOUNTER — APPOINTMENT (OUTPATIENT)
Dept: CT IMAGING | Age: 55
End: 2020-03-21
Payer: COMMERCIAL

## 2020-03-21 PROBLEM — K57.92 ACUTE DIVERTICULITIS: Status: ACTIVE | Noted: 2020-03-21

## 2020-03-21 LAB
ALBUMIN SERPL-MCNC: 4.5 G/DL (ref 3.5–5.2)
ALP BLD-CCNC: 67 U/L (ref 35–104)
ALT SERPL-CCNC: 20 U/L (ref 0–32)
ANION GAP SERPL CALCULATED.3IONS-SCNC: 14 MMOL/L (ref 7–16)
AST SERPL-CCNC: 21 U/L (ref 0–31)
BASOPHILS ABSOLUTE: 0.03 E9/L (ref 0–0.2)
BASOPHILS RELATIVE PERCENT: 0.4 % (ref 0–2)
BILIRUB SERPL-MCNC: 0.2 MG/DL (ref 0–1.2)
BUN BLDV-MCNC: 16 MG/DL (ref 6–20)
CALCIUM SERPL-MCNC: 9.8 MG/DL (ref 8.6–10.2)
CHLORIDE BLD-SCNC: 98 MMOL/L (ref 98–107)
CO2: 26 MMOL/L (ref 22–29)
CREAT SERPL-MCNC: 1 MG/DL (ref 0.5–1)
EOSINOPHILS ABSOLUTE: 0.06 E9/L (ref 0.05–0.5)
EOSINOPHILS RELATIVE PERCENT: 0.8 % (ref 0–6)
GFR AFRICAN AMERICAN: >60
GFR NON-AFRICAN AMERICAN: 57 ML/MIN/1.73
GLUCOSE BLD-MCNC: 105 MG/DL (ref 74–99)
HCT VFR BLD CALC: 41.9 % (ref 34–48)
HEMOGLOBIN: 13.8 G/DL (ref 11.5–15.5)
IMMATURE GRANULOCYTES #: 0.02 E9/L
IMMATURE GRANULOCYTES %: 0.3 % (ref 0–5)
LACTIC ACID: 0.8 MMOL/L (ref 0.5–2.2)
LIPASE: 27 U/L (ref 13–60)
LYMPHOCYTES ABSOLUTE: 1.12 E9/L (ref 1.5–4)
LYMPHOCYTES RELATIVE PERCENT: 15.3 % (ref 20–42)
MCH RBC QN AUTO: 29.6 PG (ref 26–35)
MCHC RBC AUTO-ENTMCNC: 32.9 % (ref 32–34.5)
MCV RBC AUTO: 89.9 FL (ref 80–99.9)
MONOCYTES ABSOLUTE: 0.59 E9/L (ref 0.1–0.95)
MONOCYTES RELATIVE PERCENT: 8 % (ref 2–12)
NEUTROPHILS ABSOLUTE: 5.52 E9/L (ref 1.8–7.3)
NEUTROPHILS RELATIVE PERCENT: 75.2 % (ref 43–80)
PDW BLD-RTO: 12.7 FL (ref 11.5–15)
PLATELET # BLD: 305 E9/L (ref 130–450)
PMV BLD AUTO: 10.4 FL (ref 7–12)
POTASSIUM REFLEX MAGNESIUM: 4 MMOL/L (ref 3.5–5)
RBC # BLD: 4.66 E12/L (ref 3.5–5.5)
SODIUM BLD-SCNC: 138 MMOL/L (ref 132–146)
TOTAL PROTEIN: 7.2 G/DL (ref 6.4–8.3)
WBC # BLD: 7.3 E9/L (ref 4.5–11.5)

## 2020-03-21 PROCEDURE — 6360000002 HC RX W HCPCS: Performed by: EMERGENCY MEDICINE

## 2020-03-21 PROCEDURE — 99285 EMERGENCY DEPT VISIT HI MDM: CPT

## 2020-03-21 PROCEDURE — G0378 HOSPITAL OBSERVATION PER HR: HCPCS

## 2020-03-21 PROCEDURE — 6360000002 HC RX W HCPCS: Performed by: SURGERY

## 2020-03-21 PROCEDURE — 2500000003 HC RX 250 WO HCPCS: Performed by: EMERGENCY MEDICINE

## 2020-03-21 PROCEDURE — 83605 ASSAY OF LACTIC ACID: CPT

## 2020-03-21 PROCEDURE — 6360000004 HC RX CONTRAST MEDICATION: Performed by: RADIOLOGY

## 2020-03-21 PROCEDURE — 96375 TX/PRO/DX INJ NEW DRUG ADDON: CPT

## 2020-03-21 PROCEDURE — 85025 COMPLETE CBC W/AUTO DIFF WBC: CPT

## 2020-03-21 PROCEDURE — 96365 THER/PROPH/DIAG IV INF INIT: CPT

## 2020-03-21 PROCEDURE — 74177 CT ABD & PELVIS W/CONTRAST: CPT

## 2020-03-21 PROCEDURE — 96376 TX/PRO/DX INJ SAME DRUG ADON: CPT

## 2020-03-21 PROCEDURE — 2580000003 HC RX 258: Performed by: EMERGENCY MEDICINE

## 2020-03-21 PROCEDURE — 96366 THER/PROPH/DIAG IV INF ADDON: CPT

## 2020-03-21 PROCEDURE — 36415 COLL VENOUS BLD VENIPUNCTURE: CPT

## 2020-03-21 PROCEDURE — 80053 COMPREHEN METABOLIC PANEL: CPT

## 2020-03-21 PROCEDURE — 83690 ASSAY OF LIPASE: CPT

## 2020-03-21 RX ORDER — 0.9 % SODIUM CHLORIDE 0.9 %
1000 INTRAVENOUS SOLUTION INTRAVENOUS ONCE
Status: COMPLETED | OUTPATIENT
Start: 2020-03-21 | End: 2020-03-21

## 2020-03-21 RX ORDER — SODIUM CHLORIDE, SODIUM LACTATE, POTASSIUM CHLORIDE, CALCIUM CHLORIDE 600; 310; 30; 20 MG/100ML; MG/100ML; MG/100ML; MG/100ML
1000 INJECTION, SOLUTION INTRAVENOUS CONTINUOUS
Status: DISCONTINUED | OUTPATIENT
Start: 2020-03-21 | End: 2020-03-23

## 2020-03-21 RX ORDER — MORPHINE SULFATE 2 MG/ML
2 INJECTION, SOLUTION INTRAMUSCULAR; INTRAVENOUS ONCE
Status: COMPLETED | OUTPATIENT
Start: 2020-03-21 | End: 2020-03-21

## 2020-03-21 RX ORDER — KETOROLAC TROMETHAMINE 30 MG/ML
15 INJECTION, SOLUTION INTRAMUSCULAR; INTRAVENOUS ONCE
Status: COMPLETED | OUTPATIENT
Start: 2020-03-21 | End: 2020-03-21

## 2020-03-21 RX ORDER — MORPHINE SULFATE 4 MG/ML
4 INJECTION, SOLUTION INTRAMUSCULAR; INTRAVENOUS ONCE
Status: COMPLETED | OUTPATIENT
Start: 2020-03-21 | End: 2020-03-21

## 2020-03-21 RX ORDER — SODIUM CHLORIDE 0.9 % (FLUSH) 0.9 %
10 SYRINGE (ML) INJECTION PRN
Status: DISCONTINUED | OUTPATIENT
Start: 2020-03-21 | End: 2020-03-24 | Stop reason: HOSPADM

## 2020-03-21 RX ORDER — ONDANSETRON 2 MG/ML
4 INJECTION INTRAMUSCULAR; INTRAVENOUS EVERY 6 HOURS PRN
Status: DISCONTINUED | OUTPATIENT
Start: 2020-03-21 | End: 2020-03-24 | Stop reason: HOSPADM

## 2020-03-21 RX ADMIN — KETOROLAC TROMETHAMINE 15 MG: 30 INJECTION, SOLUTION INTRAMUSCULAR at 18:53

## 2020-03-21 RX ADMIN — SODIUM CHLORIDE 1000 ML: 9 INJECTION, SOLUTION INTRAVENOUS at 17:05

## 2020-03-21 RX ADMIN — IOPAMIDOL 110 ML: 755 INJECTION, SOLUTION INTRAVENOUS at 17:31

## 2020-03-21 RX ADMIN — METRONIDAZOLE 500 MG: 500 INJECTION, SOLUTION INTRAVENOUS at 17:09

## 2020-03-21 RX ADMIN — ONDANSETRON 4 MG: 2 INJECTION INTRAMUSCULAR; INTRAVENOUS at 18:53

## 2020-03-21 RX ADMIN — WATER 2 G: 1 INJECTION INTRAMUSCULAR; INTRAVENOUS; SUBCUTANEOUS at 17:05

## 2020-03-21 RX ADMIN — MORPHINE SULFATE 2 MG: 2 INJECTION, SOLUTION INTRAMUSCULAR; INTRAVENOUS at 18:50

## 2020-03-21 RX ADMIN — MORPHINE SULFATE 4 MG: 4 INJECTION, SOLUTION INTRAMUSCULAR; INTRAVENOUS at 17:14

## 2020-03-21 ASSESSMENT — PAIN SCALES - GENERAL
PAINLEVEL_OUTOF10: 10
PAINLEVEL_OUTOF10: 0
PAINLEVEL_OUTOF10: 10
PAINLEVEL_OUTOF10: 10

## 2020-03-21 NOTE — ED PROVIDER NOTES
HPI:  3/21/20, Time: 4:39 PM EDT         Inez Bucio is a 60 y.o. female presenting to the ED for lower abd pain diarrhea, cramping, beginning last week ago. The complaint has been intermittent, moderate in severity, and worsened by nothing. 47-year-old female complaining of intermittent lower abdominal pain last week. Then she noticed for the past 2 to 3 days has had significant lower abdominal discomfort in the midline and on the left. And she has had associated diarrhea. She denies any melena hematochezia hematemesis or hemoptysis. She denies any fever she denies any chest pain shortness of breath cough or cold symptoms. She states she does have a history of diverticulosis. She does have a history of breast cancer and is on a aromatase inhibitor. Review of Systems:   Pertinent positives and negatives are stated within HPI, all other systems reviewed and are negative.          --------------------------------------------- PAST HISTORY ---------------------------------------------  Past Medical History:  has a past medical history of Anxiety, Blood transfusion, Breast cancer ([facility] [address] Cancer ([facility] Diverticulosis, Dysthymia, Generalized headaches, Hypothyroidism, Myofacial muscle pain, Nausea & vomiting, PONV (postoperative nausea and vomiting), Pure hypercholesterolemia, and Thyroid disease. Past Surgical History:  has a past surgical history that includes Breast lumpectomy and Ectopic pregnancy surgery (1981). Social History:  reports that she has never smoked. She has never used smokeless tobacco. She reports current alcohol use of about 2.0 standard drinks of alcohol per week. She reports that she does not use drugs. Family History: family history includes Breast Cancer in her mother and paternal grandmother; Heart Attack in her mother; Heart Disease in her sister; Scoliosis in her daughter. The patients home medications have been reviewed.     Allergies: Patient has no known in time range)   ketorolac (TORADOL) injection 15 mg (has no administration in time range)   morphine (PF) injection 2 mg (has no administration in time range)   0.9 % sodium chloride bolus (1,000 mLs Intravenous New Bag 3/21/20 1705)   cefTRIAXone (ROCEPHIN) 2 g in sterile water 20 mL IV syringe (2 g Intravenous Given 3/21/20 1705)   metronidazole (FLAGYL) 500 mg in NaCl 100 mL IVPB premix (500 mg Intravenous New Bag 3/21/20 1709)   morphine sulfate (PF) injection 4 mg (4 mg Intravenous Given 3/21/20 1714)   iopamidol (ISOVUE-370) 76 % injection 110 mL (110 mLs Intravenous Given 3/21/20 1731)         ED COURSE:  ED Course as of Mar 21 1844   Sat Mar 21, 2020   1841 Pt very tender on exam, extremely uncomfortable, she feels more comfortable staying hse has seen dr Renae Gunderson in the past    [DEVANTE]   1841 D/w dr Renae Gunderson he will admit to his service    [DEVANTE]      ED Course User Index  [DEVANTE] Angelia Blackburn,        Medical Decision Making:    Patient exam was pretty concerning. She is very tender in her lower abdomen. We had to re-medicate her for pain control. She is still very uncomfortable and will need to be admitted for surgical evaluation and pain control and IV antibiotics. Adrián Car CAT scan showing likely diverticulitis. Patient was given Rocephin and Flagyl. I personally discussed with the patient's surgeon who will admit her. She is agreeable with the plan. Counseling: The emergency provider has spoken with the patient and discussed todays results, in addition to providing specific details for the plan of care and counseling regarding the diagnosis and prognosis. Questions are answered at this time and they are agreeable with the plan.      --------------------------------- IMPRESSION AND DISPOSITION ---------------------------------    IMPRESSION  1. Acute diverticulitis    2.  Intractable abdominal pain        DISPOSITION  Disposition: Admit to med/surg floor  Patient condition is fair      NOTE: This report was transcribed using voice recognition software.  Every effort was made to ensure accuracy; however, inadvertent computerized transcription errors may be present       Enmanuel Morales DO  03/21/20 9829

## 2020-03-22 LAB
ANION GAP SERPL CALCULATED.3IONS-SCNC: 9 MMOL/L (ref 7–16)
BASOPHILS ABSOLUTE: 0.02 E9/L (ref 0–0.2)
BASOPHILS RELATIVE PERCENT: 0.3 % (ref 0–2)
BILIRUBIN URINE: NEGATIVE
BLOOD, URINE: NEGATIVE
BUN BLDV-MCNC: 12 MG/DL (ref 6–20)
CALCIUM SERPL-MCNC: 8.7 MG/DL (ref 8.6–10.2)
CHLORIDE BLD-SCNC: 101 MMOL/L (ref 98–107)
CLARITY: CLEAR
CO2: 26 MMOL/L (ref 22–29)
COLOR: YELLOW
CREAT SERPL-MCNC: 1 MG/DL (ref 0.5–1)
EOSINOPHILS ABSOLUTE: 0.1 E9/L (ref 0.05–0.5)
EOSINOPHILS RELATIVE PERCENT: 1.3 % (ref 0–6)
GFR AFRICAN AMERICAN: >60
GFR NON-AFRICAN AMERICAN: 57 ML/MIN/1.73
GLUCOSE BLD-MCNC: 101 MG/DL (ref 74–99)
GLUCOSE URINE: NEGATIVE MG/DL
HCT VFR BLD CALC: 37 % (ref 34–48)
HEMOGLOBIN: 12 G/DL (ref 11.5–15.5)
IMMATURE GRANULOCYTES #: 0.03 E9/L
IMMATURE GRANULOCYTES %: 0.4 % (ref 0–5)
KETONES, URINE: NEGATIVE MG/DL
LEUKOCYTE ESTERASE, URINE: NEGATIVE
LYMPHOCYTES ABSOLUTE: 1.48 E9/L (ref 1.5–4)
LYMPHOCYTES RELATIVE PERCENT: 19.8 % (ref 20–42)
MCH RBC QN AUTO: 29.6 PG (ref 26–35)
MCHC RBC AUTO-ENTMCNC: 32.4 % (ref 32–34.5)
MCV RBC AUTO: 91.1 FL (ref 80–99.9)
MONOCYTES ABSOLUTE: 0.84 E9/L (ref 0.1–0.95)
MONOCYTES RELATIVE PERCENT: 11.2 % (ref 2–12)
NEUTROPHILS ABSOLUTE: 5.02 E9/L (ref 1.8–7.3)
NEUTROPHILS RELATIVE PERCENT: 67 % (ref 43–80)
NITRITE, URINE: NEGATIVE
PDW BLD-RTO: 12.7 FL (ref 11.5–15)
PH UA: 5.5 (ref 5–9)
PLATELET # BLD: 245 E9/L (ref 130–450)
PMV BLD AUTO: 10.6 FL (ref 7–12)
POTASSIUM SERPL-SCNC: 4.1 MMOL/L (ref 3.5–5)
PROTEIN UA: NEGATIVE MG/DL
RBC # BLD: 4.06 E12/L (ref 3.5–5.5)
SODIUM BLD-SCNC: 136 MMOL/L (ref 132–146)
SPECIFIC GRAVITY UA: 1.02 (ref 1–1.03)
UROBILINOGEN, URINE: 0.2 E.U./DL
WBC # BLD: 7.5 E9/L (ref 4.5–11.5)

## 2020-03-22 PROCEDURE — 96375 TX/PRO/DX INJ NEW DRUG ADDON: CPT

## 2020-03-22 PROCEDURE — 2500000003 HC RX 250 WO HCPCS: Performed by: SURGERY

## 2020-03-22 PROCEDURE — 85025 COMPLETE CBC W/AUTO DIFF WBC: CPT

## 2020-03-22 PROCEDURE — 96376 TX/PRO/DX INJ SAME DRUG ADON: CPT

## 2020-03-22 PROCEDURE — 81003 URINALYSIS AUTO W/O SCOPE: CPT

## 2020-03-22 PROCEDURE — 2580000003 HC RX 258: Performed by: SURGERY

## 2020-03-22 PROCEDURE — G0378 HOSPITAL OBSERVATION PER HR: HCPCS

## 2020-03-22 PROCEDURE — 6370000000 HC RX 637 (ALT 250 FOR IP): Performed by: SURGERY

## 2020-03-22 PROCEDURE — 6360000002 HC RX W HCPCS: Performed by: SURGERY

## 2020-03-22 PROCEDURE — 96366 THER/PROPH/DIAG IV INF ADDON: CPT

## 2020-03-22 PROCEDURE — 36415 COLL VENOUS BLD VENIPUNCTURE: CPT

## 2020-03-22 PROCEDURE — 80048 BASIC METABOLIC PNL TOTAL CA: CPT

## 2020-03-22 PROCEDURE — 96372 THER/PROPH/DIAG INJ SC/IM: CPT

## 2020-03-22 RX ORDER — POLYETHYLENE GLYCOL 3350 17 G/17G
17 POWDER, FOR SOLUTION ORAL DAILY
Status: DISCONTINUED | OUTPATIENT
Start: 2020-03-22 | End: 2020-03-24 | Stop reason: HOSPADM

## 2020-03-22 RX ORDER — HYDROCODONE BITARTRATE AND ACETAMINOPHEN 5; 325 MG/1; MG/1
1 TABLET ORAL EVERY 6 HOURS PRN
Status: DISCONTINUED | OUTPATIENT
Start: 2020-03-22 | End: 2020-03-24 | Stop reason: HOSPADM

## 2020-03-22 RX ORDER — DOCUSATE SODIUM 100 MG/1
100 CAPSULE, LIQUID FILLED ORAL 2 TIMES DAILY
Status: DISCONTINUED | OUTPATIENT
Start: 2020-03-22 | End: 2020-03-24 | Stop reason: HOSPADM

## 2020-03-22 RX ADMIN — METRONIDAZOLE 500 MG: 500 INJECTION, SOLUTION INTRAVENOUS at 08:54

## 2020-03-22 RX ADMIN — DOCUSATE SODIUM 100 MG: 100 CAPSULE, LIQUID FILLED ORAL at 14:56

## 2020-03-22 RX ADMIN — SODIUM CHLORIDE, PRESERVATIVE FREE 10 ML: 5 INJECTION INTRAVENOUS at 17:01

## 2020-03-22 RX ADMIN — DOCUSATE SODIUM 100 MG: 100 CAPSULE, LIQUID FILLED ORAL at 20:02

## 2020-03-22 RX ADMIN — METRONIDAZOLE 500 MG: 500 INJECTION, SOLUTION INTRAVENOUS at 17:01

## 2020-03-22 RX ADMIN — ENOXAPARIN SODIUM 40 MG: 40 INJECTION SUBCUTANEOUS at 08:53

## 2020-03-22 RX ADMIN — WATER 1 G: 1 INJECTION INTRAMUSCULAR; INTRAVENOUS; SUBCUTANEOUS at 17:01

## 2020-03-22 RX ADMIN — METRONIDAZOLE 500 MG: 500 INJECTION, SOLUTION INTRAVENOUS at 01:36

## 2020-03-22 RX ADMIN — POLYETHYLENE GLYCOL 3350 17 G: 17 POWDER, FOR SOLUTION ORAL at 14:56

## 2020-03-22 RX ADMIN — SODIUM CHLORIDE, POTASSIUM CHLORIDE, SODIUM LACTATE AND CALCIUM CHLORIDE 1000 ML: 600; 310; 30; 20 INJECTION, SOLUTION INTRAVENOUS at 14:56

## 2020-03-22 RX ADMIN — HYDROMORPHONE HYDROCHLORIDE 0.5 MG: 1 INJECTION, SOLUTION INTRAMUSCULAR; INTRAVENOUS; SUBCUTANEOUS at 08:52

## 2020-03-22 RX ADMIN — SODIUM CHLORIDE, PRESERVATIVE FREE 10 ML: 5 INJECTION INTRAVENOUS at 08:54

## 2020-03-22 RX ADMIN — ONDANSETRON 4 MG: 2 INJECTION INTRAMUSCULAR; INTRAVENOUS at 22:21

## 2020-03-22 RX ADMIN — SODIUM CHLORIDE, POTASSIUM CHLORIDE, SODIUM LACTATE AND CALCIUM CHLORIDE 1000 ML: 600; 310; 30; 20 INJECTION, SOLUTION INTRAVENOUS at 23:43

## 2020-03-22 ASSESSMENT — PAIN SCALES - GENERAL
PAINLEVEL_OUTOF10: 3
PAINLEVEL_OUTOF10: 5
PAINLEVEL_OUTOF10: 5

## 2020-03-22 ASSESSMENT — PAIN DESCRIPTION - DESCRIPTORS: DESCRIPTORS: DISCOMFORT;DULL;CRAMPING

## 2020-03-22 ASSESSMENT — PAIN - FUNCTIONAL ASSESSMENT: PAIN_FUNCTIONAL_ASSESSMENT: ACTIVITIES ARE NOT PREVENTED

## 2020-03-22 ASSESSMENT — PAIN DESCRIPTION - ONSET: ONSET: ON-GOING

## 2020-03-22 ASSESSMENT — PAIN DESCRIPTION - LOCATION: LOCATION: ABDOMEN

## 2020-03-22 ASSESSMENT — PAIN DESCRIPTION - PROGRESSION: CLINICAL_PROGRESSION: NOT CHANGED

## 2020-03-22 ASSESSMENT — PAIN DESCRIPTION - ORIENTATION: ORIENTATION: LOWER

## 2020-03-22 ASSESSMENT — PAIN DESCRIPTION - PAIN TYPE: TYPE: ACUTE PAIN

## 2020-03-22 ASSESSMENT — PAIN DESCRIPTION - FREQUENCY: FREQUENCY: CONTINUOUS

## 2020-03-22 NOTE — H&P
History and Physical    Patient's Name/Date of Birth: Helen Joaquin / 1965    Date: March 22, 2020     PCP: Wanda Green MD     Chief Complaint:    Acute diverticulitis    History of Present Illness:    Patient is a 58-year-old white female who presents with an episode of acute diverticulitis. The patient states that she has had the abdominal pain for at least a few days. The pain has worsened. She has had some associated fevers. She also states that she had constipation prior to the onset of her symptoms. The patient has never been treated for diverticulitis in the past.  The patient has undergone a colonoscopy in the remote past.      Past Medical History:   Diagnosis Date    Anxiety     Blood transfusion     1981    Breast cancer ([facility] [address]     Cancer ([facility] [address]     Diverticulosis     Dysthymia 7/25/2019    Generalized headaches     Hypothyroidism     Myofacial muscle pain     Nausea & vomiting     PONV (postoperative nausea and vomiting)     Pure hypercholesterolemia 7/26/2018    Thyroid disease       Past Surgical History:   Procedure Laterality Date    BREAST LUMPECTOMY      right breast    ECTOPIC PREGNANCY SURGERY  1981      Allergies: Patient has no known allergies.      Current Facility-Administered Medications   Medication Dose Route Frequency Provider Last Rate Last Dose    lactated ringers infusion 1,000 mL  1,000 mL Intravenous Continuous Geovanna Dewey  mL/hr at 03/21/20 2231      ondansetron (ZOFRAN) injection 4 mg  4 mg Intravenous Q6H PRN Geovanna Dewey MD   4 mg at 03/21/20 1853    HYDROmorphone (DILAUDID) injection 0.5 mg  0.5 mg Intravenous Q3H PRN Geovanna Dewey MD   0.5 mg at 03/22/20 8298    enoxaparin (LOVENOX) injection 40 mg  40 mg Subcutaneous Daily Geovanna Dewey MD   40 mg at 03/22/20 0853    metronidazole (FLAGYL) 500 mg in NaCl 100 mL IVPB premix  500 mg Intravenous Q8H Geovanna Dewey MD   Stopped at 03/22/20 3112    cefTRIAXone (ROCEPHIN) 1 g

## 2020-03-23 LAB
ANION GAP SERPL CALCULATED.3IONS-SCNC: 11 MMOL/L (ref 7–16)
BASOPHILS ABSOLUTE: 0.03 E9/L (ref 0–0.2)
BASOPHILS RELATIVE PERCENT: 0.3 % (ref 0–2)
BUN BLDV-MCNC: 7 MG/DL (ref 6–20)
CALCIUM SERPL-MCNC: 8.6 MG/DL (ref 8.6–10.2)
CHLORIDE BLD-SCNC: 102 MMOL/L (ref 98–107)
CO2: 24 MMOL/L (ref 22–29)
CREAT SERPL-MCNC: 0.9 MG/DL (ref 0.5–1)
EOSINOPHILS ABSOLUTE: 0.1 E9/L (ref 0.05–0.5)
EOSINOPHILS RELATIVE PERCENT: 1.1 % (ref 0–6)
GFR AFRICAN AMERICAN: >60
GFR NON-AFRICAN AMERICAN: >60 ML/MIN/1.73
GLUCOSE BLD-MCNC: 97 MG/DL (ref 74–99)
HCT VFR BLD CALC: 34.1 % (ref 34–48)
HEMOGLOBIN: 11.3 G/DL (ref 11.5–15.5)
IMMATURE GRANULOCYTES #: 0.03 E9/L
IMMATURE GRANULOCYTES %: 0.3 % (ref 0–5)
LYMPHOCYTES ABSOLUTE: 1.49 E9/L (ref 1.5–4)
LYMPHOCYTES RELATIVE PERCENT: 16.7 % (ref 20–42)
MCH RBC QN AUTO: 30.1 PG (ref 26–35)
MCHC RBC AUTO-ENTMCNC: 33.1 % (ref 32–34.5)
MCV RBC AUTO: 90.7 FL (ref 80–99.9)
MONOCYTES ABSOLUTE: 0.73 E9/L (ref 0.1–0.95)
MONOCYTES RELATIVE PERCENT: 8.2 % (ref 2–12)
NEUTROPHILS ABSOLUTE: 6.55 E9/L (ref 1.8–7.3)
NEUTROPHILS RELATIVE PERCENT: 73.4 % (ref 43–80)
PDW BLD-RTO: 12.7 FL (ref 11.5–15)
PLATELET # BLD: 237 E9/L (ref 130–450)
PMV BLD AUTO: 10.8 FL (ref 7–12)
POTASSIUM SERPL-SCNC: 4 MMOL/L (ref 3.5–5)
RBC # BLD: 3.76 E12/L (ref 3.5–5.5)
SODIUM BLD-SCNC: 137 MMOL/L (ref 132–146)
WBC # BLD: 8.9 E9/L (ref 4.5–11.5)

## 2020-03-23 PROCEDURE — 36415 COLL VENOUS BLD VENIPUNCTURE: CPT

## 2020-03-23 PROCEDURE — G0378 HOSPITAL OBSERVATION PER HR: HCPCS

## 2020-03-23 PROCEDURE — 80048 BASIC METABOLIC PNL TOTAL CA: CPT

## 2020-03-23 PROCEDURE — 85025 COMPLETE CBC W/AUTO DIFF WBC: CPT

## 2020-03-23 PROCEDURE — 96372 THER/PROPH/DIAG INJ SC/IM: CPT

## 2020-03-23 PROCEDURE — 2580000003 HC RX 258: Performed by: SURGERY

## 2020-03-23 PROCEDURE — 6370000000 HC RX 637 (ALT 250 FOR IP): Performed by: SURGERY

## 2020-03-23 PROCEDURE — 96366 THER/PROPH/DIAG IV INF ADDON: CPT

## 2020-03-23 PROCEDURE — 2500000003 HC RX 250 WO HCPCS: Performed by: SURGERY

## 2020-03-23 PROCEDURE — 96376 TX/PRO/DX INJ SAME DRUG ADON: CPT

## 2020-03-23 PROCEDURE — 6360000002 HC RX W HCPCS: Performed by: SURGERY

## 2020-03-23 RX ORDER — CEFDINIR 300 MG/1
300 CAPSULE ORAL EVERY 12 HOURS SCHEDULED
Status: DISCONTINUED | OUTPATIENT
Start: 2020-03-23 | End: 2020-03-24 | Stop reason: HOSPADM

## 2020-03-23 RX ORDER — METRONIDAZOLE 500 MG/1
500 TABLET ORAL EVERY 8 HOURS SCHEDULED
Status: DISCONTINUED | OUTPATIENT
Start: 2020-03-23 | End: 2020-03-24 | Stop reason: HOSPADM

## 2020-03-23 RX ADMIN — POLYETHYLENE GLYCOL 3350 17 G: 17 POWDER, FOR SOLUTION ORAL at 08:07

## 2020-03-23 RX ADMIN — METRONIDAZOLE 500 MG: 500 INJECTION, SOLUTION INTRAVENOUS at 08:07

## 2020-03-23 RX ADMIN — CEFDINIR 300 MG: 300 CAPSULE ORAL at 21:30

## 2020-03-23 RX ADMIN — METRONIDAZOLE 500 MG: 500 TABLET, FILM COATED ORAL at 14:15

## 2020-03-23 RX ADMIN — CEFDINIR 300 MG: 300 CAPSULE ORAL at 10:30

## 2020-03-23 RX ADMIN — METRONIDAZOLE 500 MG: 500 INJECTION, SOLUTION INTRAVENOUS at 00:30

## 2020-03-23 RX ADMIN — ENOXAPARIN SODIUM 40 MG: 40 INJECTION SUBCUTANEOUS at 08:06

## 2020-03-23 RX ADMIN — METRONIDAZOLE 500 MG: 500 TABLET, FILM COATED ORAL at 21:53

## 2020-03-23 RX ADMIN — DOCUSATE SODIUM 100 MG: 100 CAPSULE, LIQUID FILLED ORAL at 21:53

## 2020-03-23 RX ADMIN — DOCUSATE SODIUM 100 MG: 100 CAPSULE, LIQUID FILLED ORAL at 08:07

## 2020-03-23 RX ADMIN — SODIUM CHLORIDE, POTASSIUM CHLORIDE, SODIUM LACTATE AND CALCIUM CHLORIDE 1000 ML: 600; 310; 30; 20 INJECTION, SOLUTION INTRAVENOUS at 10:32

## 2020-03-23 RX ADMIN — ONDANSETRON 4 MG: 2 INJECTION INTRAMUSCULAR; INTRAVENOUS at 21:55

## 2020-03-23 ASSESSMENT — PAIN SCALES - GENERAL: PAINLEVEL_OUTOF10: 0

## 2020-03-23 NOTE — PLAN OF CARE
Problem: Pain:  Goal: Pain level will decrease  Description: Pain level will decrease  3/22/2020 2152 by Adriana Chang RN  Outcome: Met This Shift  3/22/2020 1835 by Eldon Desai RN  Outcome: Met This Shift  Goal: Control of acute pain  Description: Control of acute pain  3/22/2020 2152 by Adriana Chang RN  Outcome: Met This Shift  3/22/2020 1835 by Eldon Desai RN  Outcome: Met This Shift     Problem: Nutritional:  Goal: Ability to follow a diet with enough fiber (20 to 30 grams) for normal bowel function will improve  Description: Ability to follow a diet with enough fiber (20 to 30 grams) for normal bowel function will improve  3/22/2020 2152 by Adriana Chang RN  Outcome: Met This Shift  3/22/2020 1835 by Eldon Desai RN  Outcome: Met This Shift     Problem: Falls - Risk of:  Goal: Will remain free from falls  Description: Will remain free from falls  3/22/2020 2152 by Adriana Chang RN  Outcome: Met This Shift  3/22/2020 1835 by Eldon Desai RN  Outcome: Met This Shift  Goal: Absence of physical injury  Description: Absence of physical injury  3/22/2020 2152 by Adriana Chang RN  Outcome: Met This Shift  3/22/2020 1835 by Eldon Desai RN  Outcome: Met This Shift     Problem:  Bowel/Gastric:  Goal: Control of bowel function will improve  Description: Control of bowel function will improve  3/22/2020 2152 by Adriana Chang RN  Outcome: Ongoing  3/22/2020 1835 by Eldon Desai RN  Outcome: Met This Shift  Goal: Ability to achieve a regular elimination pattern will improve  Description: Ability to achieve a regular elimination pattern will improve  3/22/2020 2152 by Adriana Chang RN  Outcome: Ongoing  3/22/2020 1835 by Eldon Desai RN  Outcome: Met This Shift

## 2020-03-23 NOTE — CARE COORDINATION
Phone conversation w/ patient. Explained role of , plan of care, Observation LOC. Lives w/ . Independent PTA. No Hx [facility] or [facility]. PCP is Dr. Chrissie Brady and pharmacy is [facility]. Placed on oral abx and diet advanced today. Plan per pt.  is to return home on discharge-states has no needs [place]

## 2020-03-23 NOTE — PROGRESS NOTES
Department of Surgery - Adult  General Surgery  Dr. Arlin Fierro's Progress Note      SUBJECTIVE: Overall improved    OBJECTIVE      Physical    VITALS:  /66   Pulse 80   Temp 98.1 °F (36.7 °C) (Oral)   Resp 16   Ht 5' 5.5\" (1.664 m)   Wt 146 lb 4.8 oz (66.4 kg)   LMP 02/10/2011   SpO2 95%   BMI 23.98 kg/m²   INTAKE/OUTPUT:      Intake/Output Summary (Last 24 hours) at 3/23/2020 0935  Last data filed at 3/23/2020 0657  Gross per 24 hour   Intake 2264 ml   Output 1550 ml   Net 714 ml     TEMPERATURE:  Current - Temp: 98.1 °F (36.7 °C); Max - Temp  Av.9 °F (37.2 °C)  Min: 98.1 °F (36.7 °C)  Max: 99.6 °F (37.6 °C)  RESPIRATIONS RANGE: Resp  Av  Min: 16  Max: 18  PULSE RANGE: Pulse  Av.5  Min: 80  Max: 85  BLOOD PRESSURE RANGE:  Systolic (89FQK), CZF:225 , Min:120 , XUR:505   ; Diastolic (87AYT), QPX:24, Min:66, Max:70    PULSE OXIMETRY RANGE: SpO2  Av.5 %  Min: 94 %  Max: 95 %  CONSTITUTIONAL:  awake, alert, cooperative, no apparent distress, and appears stated age  LUNGS:  No increased work of breathing, good air exchange, clear to auscultation bilaterally, no crackles or wheezing  CARDIOVASCULAR:  regular rate and rhythm and no murmur noted  ABDOMEN: The abdomen is soft non-distended with minimal suprapubic and lower quadrant abdominal tenderness.   Data  CBC with Differential:    Lab Results   Component Value Date    WBC 8.9 2020    RBC 3.76 2020    HGB 11.3 2020    HCT 34.1 2020     2020    MCV 90.7 2020    MCH 30.1 2020    MCHC 33.1 2020    RDW 12.7 2020    LYMPHOPCT 16.7 2020    MONOPCT 8.2 2020    EOSPCT 3 2018    BASOPCT 0.3 2020    MONOSABS 0.73 2020    LYMPHSABS 1.49 2020    EOSABS 0.10 2020    BASOSABS 0.03 2020     CMP:    Lab Results   Component Value Date     2020    K 4.0 2020    K 4.0 2020     2020    CO2 24 2020    BUN 7 03/23/2020    CREATININE 0.9 03/23/2020    GFRAA >60 03/23/2020    LABGLOM >60 03/23/2020    GLUCOSE 97 03/23/2020    GLUCOSE 79 12/22/2010    PROT 7.2 03/21/2020    LABALBU 4.5 03/21/2020    LABALBU 4.3 12/22/2010    CALCIUM 8.6 03/23/2020    BILITOT 0.2 03/21/2020    ALKPHOS 67 03/21/2020    AST 21 03/21/2020    ALT 20 03/21/2020     BMP:  Hepatic Function Panel:  Ionized Calcium:  No results found for: IONCA  Magnesium:  No results found for: MG  Phosphorus:  No results found for: PHOS    Current Inpatient Medications    Current Facility-Administered Medications: HYDROcodone-acetaminophen (NORCO) 5-325 MG per tablet 1 tablet, 1 tablet, Oral, Q6H PRN  polyethylene glycol (GLYCOLAX) packet 17 g, 17 g, Oral, Daily  docusate sodium (COLACE) capsule 100 mg, 100 mg, Oral, BID  lactated ringers infusion 1,000 mL, 1,000 mL, Intravenous, Continuous  ondansetron (ZOFRAN) injection 4 mg, 4 mg, Intravenous, Q6H PRN  HYDROmorphone (DILAUDID) injection 0.5 mg, 0.5 mg, Intravenous, Q3H PRN  enoxaparin (LOVENOX) injection 40 mg, 40 mg, Subcutaneous, Daily  metronidazole (FLAGYL) 500 mg in NaCl 100 mL IVPB premix, 500 mg, Intravenous, Q8H  cefTRIAXone (ROCEPHIN) 1 g in sterile water 10 mL IV syringe, 1 g, Intravenous, Q24H  sodium chloride flush 0.9 % injection 10 mL, 10 mL, Intravenous, PRN    ASSESSMENT:    60 y.o. female with resolving uncomplicated acute diverticulitis     PLAN:    Advance diet  Oral antibiotic    Magnus Rumpf 3/23/11841:35 AM

## 2020-03-24 VITALS
SYSTOLIC BLOOD PRESSURE: 117 MMHG | BODY MASS INDEX: 23.51 KG/M2 | HEIGHT: 66 IN | DIASTOLIC BLOOD PRESSURE: 56 MMHG | HEART RATE: 78 BPM | OXYGEN SATURATION: 97 % | RESPIRATION RATE: 16 BRPM | WEIGHT: 146.3 LBS | TEMPERATURE: 97.6 F

## 2020-03-24 PROCEDURE — 6370000000 HC RX 637 (ALT 250 FOR IP): Performed by: SURGERY

## 2020-03-24 PROCEDURE — G0378 HOSPITAL OBSERVATION PER HR: HCPCS

## 2020-03-24 PROCEDURE — 96372 THER/PROPH/DIAG INJ SC/IM: CPT

## 2020-03-24 PROCEDURE — 6360000002 HC RX W HCPCS: Performed by: SURGERY

## 2020-03-24 RX ORDER — HYDROCODONE BITARTRATE AND ACETAMINOPHEN 5; 325 MG/1; MG/1
1 TABLET ORAL EVERY 6 HOURS PRN
Qty: 12 TABLET | Refills: 0 | Status: SHIPPED | OUTPATIENT
Start: 2020-03-24 | End: 2020-03-27

## 2020-03-24 RX ORDER — METRONIDAZOLE 500 MG/1
500 TABLET ORAL EVERY 8 HOURS SCHEDULED
Qty: 30 TABLET | Refills: 0 | Status: SHIPPED | OUTPATIENT
Start: 2020-03-24 | End: 2020-04-03

## 2020-03-24 RX ORDER — CEFDINIR 300 MG/1
300 CAPSULE ORAL EVERY 12 HOURS SCHEDULED
Qty: 20 CAPSULE | Refills: 0 | Status: SHIPPED | OUTPATIENT
Start: 2020-03-24 | End: 2020-04-03

## 2020-03-24 RX ADMIN — POLYETHYLENE GLYCOL 3350 17 G: 17 POWDER, FOR SOLUTION ORAL at 08:53

## 2020-03-24 RX ADMIN — ENOXAPARIN SODIUM 40 MG: 40 INJECTION SUBCUTANEOUS at 08:52

## 2020-03-24 RX ADMIN — DOCUSATE SODIUM 100 MG: 100 CAPSULE, LIQUID FILLED ORAL at 08:53

## 2020-03-24 RX ADMIN — METRONIDAZOLE 500 MG: 500 TABLET, FILM COATED ORAL at 06:14

## 2020-03-24 RX ADMIN — CEFDINIR 300 MG: 300 CAPSULE ORAL at 08:53

## 2020-03-24 NOTE — PROGRESS NOTES
Department of Surgery - Adult  General Surgery  Dr. Hernan Fierro's Progress Note      SUBJECTIVE: Overall, the patient is doing quite well. The patient states that her pain is improved markedly. OBJECTIVE      Physical    VITALS:  BP (!) 117/56   Pulse 78   Temp 97.6 °F (36.4 °C) (Oral)   Resp 16   Ht 5' 5.5\" (1.664 m)   Wt 146 lb 4.8 oz (66.4 kg)   LMP 02/10/2011   SpO2 97%   BMI 23.98 kg/m²   INTAKE/OUTPUT:      Intake/Output Summary (Last 24 hours) at 3/24/2020 0858  Last data filed at 3/24/2020 0612  Gross per 24 hour   Intake 720 ml   Output 1200 ml   Net -480 ml     TEMPERATURE:  Current - Temp: 97.6 °F (36.4 °C);  Max - Temp  Av °F (36.7 °C)  Min: 97.6 °F (36.4 °C)  Max: 98.4 °F (36.9 °C)  RESPIRATIONS RANGE: Resp  Av  Min: 16  Max: 18  PULSE RANGE: Pulse  Av  Min: 78  Max: 84  BLOOD PRESSURE RANGE:  Systolic (91YJI), PZM:236 , Min:114 , PKI:785   ; Diastolic (63WYK), LME:62, Min:56, Max:64    PULSE OXIMETRY RANGE: SpO2  Av %  Min: 97 %  Max: 97 %  CONSTITUTIONAL:  awake, alert, cooperative, no apparent distress, and appears stated age  LUNGS:  No increased work of breathing, good air exchange, clear to auscultation bilaterally, no crackles or wheezing  CARDIOVASCULAR:  regular rate and rhythm and no murmur noted  ABDOMEN: On examination: The abdomen is soft nondistended with appropriate suprapubic tenderness without guarding or rebound  Data  CBC with Differential:    Lab Results   Component Value Date    WBC 8.9 2020    RBC 3.76 2020    HGB 11.3 2020    HCT 34.1 2020     2020    MCV 90.7 2020    MCH 30.1 2020    MCHC 33.1 2020    RDW 12.7 2020    LYMPHOPCT 16.7 2020    MONOPCT 8.2 2020    EOSPCT 3 2018    BASOPCT 0.3 2020    MONOSABS 0.73 2020    LYMPHSABS 1.49 2020    EOSABS 0.10 2020    BASOSABS 0.03 2020     CMP:    Lab Results   Component Value Date

## 2020-03-24 NOTE — PATIENT CARE CONFERENCE
[facility] Quality Flow/Interdisciplinary Rounds Progress Note        Quality Flow Rounds held on March 24, 2020    Disciplines Attending:  Bedside Nurse, ,  and Nursing Unit Leadership    Alem Pagan was admitted on 3/21/2020  3:55 PM    Anticipated Discharge Date:  Expected Discharge Date: 03/24/20    Disposition:    Theo Score:  Theo Scale Score: 22    Readmission Risk              Risk of Unplanned Readmission:        0           Discussed patient goal for the day, patient clinical progression, and barriers to discharge.   The following Goal(s) of the Day/Commitment(s) have been identified:  Discharge - Obtain Order      Madelyn Bowens  March 24, 2020

## 2020-03-25 ENCOUNTER — CARE COORDINATION (OUTPATIENT)
Dept: CARE COORDINATION | Age: 55
End: 2020-03-25

## 2020-03-25 NOTE — CARE COORDINATION
-ACM called and spoke with patient regarding her ED visit.  -Pt reports that she is feeling better. She said she had some abdominal discomfort this morning but she took her medications and a pain pill and she feels better.  -Pt has all prescriptions filled.  -Pt is keeping hydrated & eating a low fiber diet.  -Pt is urinating well. She had one loose stool this morning followed by a formed stool this afternoon.  -Pt spoke with surgeons office for follow up appt. She doesn't remember the exact date.  -Pt lives with her  & son. All are healthy with no fly like s/sx. Patient contacted regarding recent discharge and COVID-19 risk   Care Transition Nurse/ Ambulatory Care Manager contacted the patient by telephone to perform post discharge assessment. Verified name and  with patient as identifiers. Patient has following risk factors of: recent hospitalization, acute diverticulitis. CTN/ACM reviewed discharge instructions, medical action plan and red flags related to discharge diagnosis. Reviewed and educated them on any new and changed medications related to discharge diagnosis. Advised obtaining a 90-day supply of all daily and as-needed medications. Education provided regarding infection prevention, and signs and symptoms of COVID-19 and when to seek medical attention with patient who verbalized understanding. Discussed exposure protocols and quarantine from [address] you at higher risk for severe illness  and given an opportunity for questions and concerns. The patient agrees to contact the COVID-19 hotline [phone] or PCP office for questions related to their healthcare. CTN/ACM provided contact information for future reference. From CDC: Are you at higher risk for severe illness?  Wash your hands often.  Avoid close contact (6 feet, which is about two arm lengths) with people who are sick.    Put distance between yourself and other people if COVID-19 is spreading in your community.  Clean and disinfect frequently touched surfaces.  Avoid all cruise travel and non-essential air travel.  Call your healthcare professional if you have concerns about COVID-19 and your underlying condition or if you are sick.     For more information on steps you can take to protect yourself, see CDC's How to Protect Yourself  ACM

## 2020-03-26 NOTE — DISCHARGE SUMMARY
Surgery Discharge Summary    [facility] SUMMARY:                The patient is a 60 y.o. female who was admitted to the hospital on 3/21/2020  3:55 PM for treatment of acute diverticulitis. The patient was discharged on 3/24/2020 11:06 AM tolerating a diet, moving bowels, and urinating without difficulty. The incisions were clean and intact. The patient was discharged to home in satisfactory condition with instructions to call for a follow up appointment. Hospital Problem List:  Active Problems:    Acute diverticulitis  Resolved Problems:    * No resolved hospital problems. *         Discharge Medications:    Odette Mixon   Home Medication Instructions NSA:[id number]    Printed on:03/26/20 6721   Medication Information                      anastrozole (ARIMIDEX) 1 MG tablet  Take 1 mg by mouth daily             calcium carbonate 600 MG TABS tablet  Take 1 tablet by mouth 2 times daily             cefdinir (OMNICEF) 300 MG capsule  Take 1 capsule by mouth every 12 hours for 10 days             citalopram (CELEXA) 40 MG tablet  Take 1 tablet by mouth daily             HYDROcodone-acetaminophen (NORCO) 5-325 MG per tablet  Take 1 tablet by mouth every 6 hours as needed for Pain for up to 3 days.              levothyroxine (SYNTHROID) 50 MCG tablet  Take 1 tablet by mouth daily             metroNIDAZOLE (FLAGYL) 500 MG tablet  Take 1 tablet by mouth every 8 hours for 10 days             Multiple Vitamins-Minerals (THERAPEUTIC MULTIVITAMIN-MINERALS) tablet  Take 1 tablet by mouth daily                 Karson Ayala  3/26/2020

## 2020-04-09 ENCOUNTER — CARE COORDINATION (OUTPATIENT)
Dept: CARE COORDINATION | Age: 55
End: 2020-04-09

## 2020-05-16 ENCOUNTER — HOSPITAL ENCOUNTER (OUTPATIENT)
Age: 55
Discharge: HOME | End: 2020-05-18
Payer: COMMERCIAL

## 2020-05-16 PROCEDURE — U0003 INFECTIOUS AGENT DETECTION BY NUCLEIC ACID (DNA OR RNA); SEVERE ACUTE RESPIRATORY SYNDROME CORONAVIRUS 2 (SARS-COV-2) (CORONAVIRUS DISEASE [COVID-19]), AMPLIFIED PROBE TECHNIQUE, MAKING USE OF HIGH THROUGHPUT TECHNOLOGIES AS DESCRIBED BY CMS-2020-01-R: HCPCS

## 2020-05-18 ENCOUNTER — PREP FOR PROCEDURE (OUTPATIENT)
Dept: SURGERY | Age: 55
End: 2020-05-18

## 2020-05-18 LAB
SARS-COV-2: NOT DETECTED
SOURCE: NORMAL

## 2020-05-18 RX ORDER — SODIUM CHLORIDE 9 MG/ML
INJECTION, SOLUTION INTRAVENOUS CONTINUOUS
Status: CANCELLED | OUTPATIENT
Start: 2020-05-18

## 2020-05-19 NOTE — PROGRESS NOTES
Have you been tested for COVID  Yes 5/16/2020 preop         Have you been told you were positive for COVID No  Have you had any known exposure to someone that is positive for COVID No  Do you have a cough                   No              Do you have shortness of breath No                 Do you have a sore throat            No                Are you having chills                    No                Are you having muscle aches. No                    Please come to the hospital wearing a mask and have your significant other wear a mask as well. Both of you should check your temperature before leaving to come here,  if it is 100 or higher please call [phone] for instruction.

## 2020-05-21 ENCOUNTER — HOSPITAL ENCOUNTER (OUTPATIENT)
Age: 55
Setting detail: OUTPATIENT SURGERY
Discharge: HOME | End: 2020-05-21
Attending: SURGERY | Admitting: SURGERY
Payer: COMMERCIAL

## 2020-05-21 ENCOUNTER — ANESTHESIA (OUTPATIENT)
Dept: ENDOSCOPY | Age: 55
End: 2020-05-21
Payer: COMMERCIAL

## 2020-05-21 ENCOUNTER — ANESTHESIA EVENT (OUTPATIENT)
Dept: ENDOSCOPY | Age: 55
End: 2020-05-21
Payer: COMMERCIAL

## 2020-05-21 VITALS
HEIGHT: 65 IN | TEMPERATURE: 97 F | BODY MASS INDEX: 24.16 KG/M2 | HEART RATE: 64 BPM | DIASTOLIC BLOOD PRESSURE: 67 MMHG | RESPIRATION RATE: 14 BRPM | SYSTOLIC BLOOD PRESSURE: 121 MMHG | OXYGEN SATURATION: 98 % | WEIGHT: 145 LBS

## 2020-05-21 VITALS — OXYGEN SATURATION: 99 % | DIASTOLIC BLOOD PRESSURE: 67 MMHG | SYSTOLIC BLOOD PRESSURE: 116 MMHG

## 2020-05-21 PROCEDURE — 3700000000 HC ANESTHESIA ATTENDED CARE: Performed by: SURGERY

## 2020-05-21 PROCEDURE — 2709999900 HC NON-CHARGEABLE SUPPLY: Performed by: SURGERY

## 2020-05-21 PROCEDURE — 2580000003 HC RX 258: Performed by: SURGERY

## 2020-05-21 PROCEDURE — 6360000002 HC RX W HCPCS: Performed by: NURSE ANESTHETIST, CERTIFIED REGISTERED

## 2020-05-21 PROCEDURE — 7100000010 HC PHASE II RECOVERY - FIRST 15 MIN: Performed by: SURGERY

## 2020-05-21 PROCEDURE — 7100000011 HC PHASE II RECOVERY - ADDTL 15 MIN: Performed by: SURGERY

## 2020-05-21 PROCEDURE — 3609027000 HC COLONOSCOPY: Performed by: SURGERY

## 2020-05-21 PROCEDURE — 3700000001 HC ADD 15 MINUTES (ANESTHESIA): Performed by: SURGERY

## 2020-05-21 RX ORDER — PROPOFOL 10 MG/ML
INJECTION, EMULSION INTRAVENOUS PRN
Status: DISCONTINUED | OUTPATIENT
Start: 2020-05-21 | End: 2020-05-21 | Stop reason: SDUPTHER

## 2020-05-21 RX ORDER — SODIUM CHLORIDE 9 MG/ML
INJECTION, SOLUTION INTRAVENOUS CONTINUOUS
Status: DISCONTINUED | OUTPATIENT
Start: 2020-05-21 | End: 2020-05-21 | Stop reason: HOSPADM

## 2020-05-21 RX ADMIN — PROPOFOL 150 MG: 10 INJECTION, EMULSION INTRAVENOUS at 07:22

## 2020-05-21 RX ADMIN — SODIUM CHLORIDE: 9 INJECTION, SOLUTION INTRAVENOUS at 07:22

## 2020-05-21 ASSESSMENT — PAIN - FUNCTIONAL ASSESSMENT: PAIN_FUNCTIONAL_ASSESSMENT: 0-10

## 2020-05-21 NOTE — DISCHARGE INSTR - DIET

## 2020-05-21 NOTE — INTERVAL H&P NOTE
Update History & Physical    The patient's History and Physical of May 21, 2020 was reviewed with the patient and I examined the patient. There was no change. The surgical site was confirmed by the patient and me. Plan: The risks, benefits, expected outcome, and alternative to the recommended procedure have been discussed with the patient. Patient understands and wants to proceed with the procedure.      Electronically signed by Sari Olivares MD on 5/21/2020 at 7:14 AM

## 2020-05-21 NOTE — ANESTHESIA PRE PROCEDURE
Department of Anesthesiology  Preprocedure Note       Name:  Myron Bellamy   Age:  60 y.o.  :  1965                                          MRN:  [id number]         Date:  2020      Surgeon: Ember Joseph):  Xander Fisher MD    Procedure: Procedure(s):  COLONOSCOPY DIAGNOSTIC    Medications prior to admission:   Prior to Admission medications    Medication Sig Start Date End Date Taking?  Authorizing Provider   citalopram (CELEXA) 40 MG tablet Take 1 tablet by mouth daily 3/11/20 9/7/20 Yes Warner Elam MD   levothyroxine (SYNTHROID) 50 MCG tablet Take 1 tablet by mouth daily 19  Yes Warner Elam MD   anastrozole (ARIMIDEX) 1 MG tablet Take 1 mg by mouth daily   Yes Historical Provider, MD   Multiple Vitamins-Minerals (THERAPEUTIC MULTIVITAMIN-MINERALS) tablet Take 1 tablet by mouth daily    Historical Provider, MD   calcium carbonate 600 MG TABS tablet Take 1 tablet by mouth 2 times daily    Historical Provider, MD       Current medications:    Current Facility-Administered Medications   Medication Dose Route Frequency Provider Last Rate Last Dose    0.9 % sodium chloride infusion   Intravenous Continuous Xander Fisher MD           Allergies:  No Known Allergies    Problem List:    Patient Active Problem List   Diagnosis Code    Breast cancer ([facility] [address] C50.919    Myofascial muscle pain M79.18    Hypothyroidism E03.9    Diverticulosis of large intestine without hemorrhage K57.30    Pure hypercholesterolemia E78.00    Vitamin D deficiency E55.9    Dysthymia F34.1    BMI 25.0-25.9,adult Z68.25    Acute diverticulitis K57.92       Past Medical History:        Diagnosis Date    Anxiety     Blood transfusion      / ectopic pregnacy    Breast cancer ([facility] [address] 2010    left breast, treated with surgery,radiation and chemo    Diverticulosis     Hypothyroidism     PONV (postoperative nausea and vomiting)     Pure hypercholesterolemia 2018    Thyroid disease        Past

## 2020-06-05 ENCOUNTER — HOSPITAL ENCOUNTER (OUTPATIENT)
Dept: GENERAL RADIOLOGY | Age: 55
Discharge: HOME | End: 2020-06-07
Payer: COMMERCIAL

## 2020-06-05 PROCEDURE — 77063 BREAST TOMOSYNTHESIS BI: CPT

## 2020-06-18 LAB
ALBUMIN SERPL-MCNC: 4.6 G/DL
ALP BLD-CCNC: 55 U/L
ALT SERPL-CCNC: 21 U/L
ANION GAP SERPL CALCULATED.3IONS-SCNC: NORMAL MMOL/L
AST SERPL-CCNC: 23 U/L
BILIRUB SERPL-MCNC: 0.4 MG/DL (ref 0.1–1.4)
BUN BLDV-MCNC: 22 MG/DL
CALCIUM SERPL-MCNC: 9.9 MG/DL
CHLORIDE BLD-SCNC: 99 MMOL/L
CHOLESTEROL, TOTAL: 224 MG/DL
CHOLESTEROL/HDL RATIO: NORMAL
CO2: 28 MMOL/L
CREAT SERPL-MCNC: 1.08 MG/DL
GFR CALCULATED: 57
GLUCOSE BLD-MCNC: 88 MG/DL
HDLC SERPL-MCNC: 55 MG/DL (ref 35–70)
LDL CHOLESTEROL CALCULATED: 144 MG/DL (ref 0–160)
POTASSIUM SERPL-SCNC: 4.5 MMOL/L
SODIUM BLD-SCNC: 140 MMOL/L
T4 FREE: 1.2
TOTAL PROTEIN: 6.9
TRIGL SERPL-MCNC: 123 MG/DL
TSH SERPL DL<=0.05 MIU/L-ACNC: 3.16 UIU/ML
VLDLC SERPL CALC-MCNC: 25 MG/DL

## 2020-06-25 ENCOUNTER — OFFICE VISIT (OUTPATIENT)
Dept: PRIMARY CARE CLINIC | Age: 55
End: 2020-06-25
Payer: COMMERCIAL

## 2020-06-25 VITALS
TEMPERATURE: 97.3 F | WEIGHT: 152 LBS | SYSTOLIC BLOOD PRESSURE: 110 MMHG | OXYGEN SATURATION: 98 % | DIASTOLIC BLOOD PRESSURE: 70 MMHG | HEART RATE: 69 BPM | HEIGHT: 66 IN | BODY MASS INDEX: 24.43 KG/M2 | RESPIRATION RATE: 18 BRPM

## 2020-06-25 PROCEDURE — 99214 OFFICE O/P EST MOD 30 MIN: CPT | Performed by: FAMILY MEDICINE

## 2020-06-25 RX ORDER — VENLAFAXINE HYDROCHLORIDE 37.5 MG/1
37.5 CAPSULE, EXTENDED RELEASE ORAL DAILY
Qty: 30 CAPSULE | Refills: 0 | Status: SHIPPED
Start: 2020-06-25 | End: 2020-07-27

## 2020-06-25 ASSESSMENT — ENCOUNTER SYMPTOMS
VOMITING: 0
SHORTNESS OF BREATH: 0
RHINORRHEA: 0
DIARRHEA: 0
ABDOMINAL PAIN: 0
CONSTIPATION: 0
WHEEZING: 0
NAUSEA: 0
SORE THROAT: 0

## 2020-06-25 NOTE — PROGRESS NOTES
2020     Chief Complaint   Patient presents with    Anxiety     6mo check     Hypothyroidism       HPI  Dany Sanders (:  1965) is a 60 y.o. female, here for evaluation of the following medical concerns:    Patient is a 57-year-old female with past medical history of breast cancer, hypothyroidism, diverticulosis, hypercholesterolemia, vitamin D deficiency, dysthymia who presents for routine follow-up of her chronic medical problems.     Labs: Completed but yet to result.      Dysthymia: Patient is on 40 mg Celexa daily. Patient reports she has been on this medication since approximately . She was previously on fluoxetine but it was changed when she was placed on tamoxifen. Patient is currently on anoscopy is all. Patient feels that this medication is no longer working and is requesting an alternative. Patient did lose both of her parents over the last year and has also had increased stress due to the current pandemic. Patient denies any SI/HI.     Hypothyroidism: Continue on Synthroid 50 µg daily until new tsh results. Diverticulosis: Has been having normal BMs. Was hospitalized for this issue several months ago. Patient reports that she has been using MiraLAX daily and drinking chocolate milk which both seem to help regulate her bowel movements.     ASCVD risk: 2.5% risk. Will update her risk with new labs will resolve. History of breast cancer: Patient follows with the [facility] yearly. Patient is currently on anastrozole 1 mg daily. Reports she is tolerating this medication well without any issues. Review of Systems   Constitutional: Negative for chills and fever. HENT: Negative for congestion, rhinorrhea and sore throat. Respiratory: Negative for shortness of breath and wheezing. Cardiovascular: Negative for chest pain and leg swelling. Gastrointestinal: Negative for abdominal pain, constipation, diarrhea, nausea and vomiting.    Genitourinary: Negative for dysuria. Musculoskeletal: Negative for arthralgias. Skin: Negative for rash. Neurological: Negative for light-headedness and headaches. Psychiatric/Behavioral: Positive for sleep disturbance. Negative for suicidal ideas. The patient is nervous/anxious. Past Medical History:   Diagnosis Date    Anxiety     Blood transfusion     1981 / ectopic pregnacy    Breast cancer ([facility] [address] 2010    left breast, treated with surgery,radiation and chemo    Diverticulosis     Hypothyroidism     PONV (postoperative nausea and vomiting)     Pure hypercholesterolemia 7/26/2018    Thyroid disease        Prior to Visit Medications    Medication Sig Taking? Authorizing Provider   venlafaxine (EFFEXOR XR) 37.5 MG extended release capsule Take 1 capsule by mouth daily Yes Aria Oseguera MD   citalopram (CELEXA) 40 MG tablet Take 1 tablet by mouth daily Yes Aria Oseguera MD   levothyroxine (SYNTHROID) 50 MCG tablet Take 1 tablet by mouth daily Yes Aria Oseguera MD   anastrozole (ARIMIDEX) 1 MG tablet Take 1 mg by mouth daily Yes Historical Provider, MD   Multiple Vitamins-Minerals (THERAPEUTIC MULTIVITAMIN-MINERALS) tablet Take 1 tablet by mouth daily Yes Historical Provider, MD   calcium carbonate 600 MG TABS tablet Take 1 tablet by mouth 2 times daily Yes Historical Provider, MD        No Known Allergies    Social History     Tobacco Use    Smoking status: Never Smoker    Smokeless tobacco: Never Used   Substance Use Topics    Alcohol use: Yes     Alcohol/week: 2.0 standard drinks     Types: 2 Glasses of wine per week           Vitals:    06/25/20 0805   BP: 110/70   Pulse: 69   Resp: 18   Temp: 97.3 °F (36.3 °C)   SpO2: 98%   Weight: 152 lb (68.9 kg)   Height: 5' 5.5\" (1.664 m)     Estimated body mass index is 24.91 kg/m² as calculated from the following:    Height as of this encounter: 5' 5.5\" (1.664 m). Weight as of this encounter: 152 lb (68.9 kg).     Physical Exam  Constitutional:       Appearance: 911, call a crisis hotline, or proceed to the emergency department. BMI 25.0-25.9,adult  Normal PMI. Malignant neoplasm of left breast in female, estrogen receptor positive, unspecified site of breast [facility])  Patient is currently following with the [facility]. Patient has been doing well on anastrozole 1 mg daily. Other orders  -     venlafaxine (EFFEXOR XR) 37.5 MG extended release capsule; Take 1 capsule by mouth daily     We'll follow patient up in approximately 2 weeks for the change of her venlafaxine. We will also review all of her recently ordered labs and completed labs at her next appointment. Return in about 2 weeks (around 7/9/2020) for Follow-up Appointment From Today's Visit, To Discuss Labs Results. An tarpipeignature was used to authenticate this note.     --Rich Hackett MD on 6/25/20 at 8:16 AM EDT

## 2020-07-22 ENCOUNTER — OFFICE VISIT (OUTPATIENT)
Dept: PRIMARY CARE CLINIC | Age: 55
End: 2020-07-22
Payer: COMMERCIAL

## 2020-07-22 VITALS
DIASTOLIC BLOOD PRESSURE: 80 MMHG | RESPIRATION RATE: 16 BRPM | BODY MASS INDEX: 24.23 KG/M2 | HEIGHT: 66 IN | HEART RATE: 80 BPM | WEIGHT: 150.8 LBS | TEMPERATURE: 97.7 F | OXYGEN SATURATION: 98 % | SYSTOLIC BLOOD PRESSURE: 124 MMHG

## 2020-07-22 PROCEDURE — 99213 OFFICE O/P EST LOW 20 MIN: CPT | Performed by: FAMILY MEDICINE

## 2020-07-22 ASSESSMENT — ENCOUNTER SYMPTOMS
NAUSEA: 0
DIARRHEA: 0
CONSTIPATION: 0
VOMITING: 0
SORE THROAT: 0
SHORTNESS OF BREATH: 0
WHEEZING: 0
RHINORRHEA: 0
ABDOMINAL PAIN: 0

## 2020-07-22 NOTE — PROGRESS NOTES
Vitamins-Minerals (THERAPEUTIC MULTIVITAMIN-MINERALS) tablet Take 1 tablet by mouth daily Yes Historical Provider, MD   calcium carbonate 600 MG TABS tablet Take 1 tablet by mouth 2 times daily Yes Historical Provider, MD   venlafaxine (EFFEXOR XR) 37.5 MG extended release capsule Take 1 capsule by mouth daily  Jeronimo Hagan MD        No Known Allergies    Social History     Tobacco Use    Smoking status: Never Smoker    Smokeless tobacco: Never Used   Substance Use Topics    Alcohol use: Yes     Alcohol/week: 2.0 standard drinks     Types: 2 Glasses of wine per week           Vitals:    07/22/20 0810   BP: 124/80   Pulse: 80   Resp: 16   Temp: 97.7 °F (36.5 °C)   SpO2: 98%   Weight: 150 lb 12.8 oz (68.4 kg)   Height: 5' 5.5\" (1.664 m)     Estimated body mass index is 24.71 kg/m² as calculated from the following:    Height as of this encounter: 5' 5.5\" (1.664 m). Weight as of this encounter: 150 lb 12.8 oz (68.4 kg). Physical Exam  Constitutional:       Appearance: She is well-developed. HENT:      Head: Normocephalic. Eyes:      Extraocular Movements: Extraocular movements intact. Conjunctiva/sclera: Conjunctivae normal.   Cardiovascular:      Rate and Rhythm: Normal rate and regular rhythm. Heart sounds: Normal heart sounds. No murmur. Pulmonary:      Effort: Pulmonary effort is normal.      Breath sounds: Normal breath sounds. No wheezing or rales. Abdominal:      General: Bowel sounds are normal.      Palpations: Abdomen is soft. Tenderness: There is no abdominal tenderness. Musculoskeletal:      Right lower leg: No edema. Left lower leg: No edema. Neurological:      Mental Status: She is alert. Comments: Cranial nerves grossly intact         ASSESSMENT/PLAN:  Saratha Halsted was seen today for discuss labs. Diagnoses and all orders for this visit:    Acquired hypothyroidism  TSH and T4 within normal limits. Continue current dosage of Synthroid.     Pure

## 2020-08-04 LAB
ALBUMIN SERPL-MCNC: 4.7 G/DL
ALP BLD-CCNC: 56 U/L
ALT SERPL-CCNC: 20 U/L
ANION GAP SERPL CALCULATED.3IONS-SCNC: NORMAL MMOL/L
AST SERPL-CCNC: 22 U/L
BASOPHILS ABSOLUTE: 0.1 /ΜL
BASOPHILS RELATIVE PERCENT: 1 %
BILIRUB SERPL-MCNC: 0.4 MG/DL (ref 0.1–1.4)
BUN BLDV-MCNC: 20 MG/DL
CALCIUM SERPL-MCNC: 9.7 MG/DL
CHLORIDE BLD-SCNC: 101 MMOL/L
CO2: 24 MMOL/L
CREAT SERPL-MCNC: 0.96 MG/DL
EOSINOPHILS ABSOLUTE: 0.2 /ΜL
EOSINOPHILS RELATIVE PERCENT: 4 %
GFR CALCULATED: 66
GLUCOSE BLD-MCNC: 86 MG/DL
HCT VFR BLD CALC: 42.6 % (ref 36–46)
HEMOGLOBIN: 13.8 G/DL (ref 12–16)
LYMPHOCYTES ABSOLUTE: 1.6 /ΜL
LYMPHOCYTES RELATIVE PERCENT: 36 %
MCH RBC QN AUTO: 29.1 PG
MCHC RBC AUTO-ENTMCNC: 32.4 G/DL
MCV RBC AUTO: 90 FL
MONOCYTES ABSOLUTE: 0.5 /ΜL
MONOCYTES RELATIVE PERCENT: 11 %
NEUTROPHILS ABSOLUTE: 2.1 /ΜL
NEUTROPHILS RELATIVE PERCENT: 48 %
PLATELET # BLD: 237 K/ΜL
PMV BLD AUTO: NORMAL FL
POTASSIUM SERPL-SCNC: 4.4 MMOL/L
RBC # BLD: 4.74 10^6/ΜL
SODIUM BLD-SCNC: 140 MMOL/L
TOTAL PROTEIN: 6.9
WBC # BLD: 4.4 10^3/ML

## 2020-09-02 ENCOUNTER — OFFICE VISIT (OUTPATIENT)
Dept: PRIMARY CARE CLINIC | Age: 55
End: 2020-09-02
Payer: COMMERCIAL

## 2020-09-02 VITALS
WEIGHT: 152 LBS | OXYGEN SATURATION: 98 % | DIASTOLIC BLOOD PRESSURE: 78 MMHG | HEART RATE: 104 BPM | HEIGHT: 66 IN | RESPIRATION RATE: 16 BRPM | TEMPERATURE: 97.8 F | SYSTOLIC BLOOD PRESSURE: 124 MMHG | BODY MASS INDEX: 24.43 KG/M2

## 2020-09-02 PROCEDURE — 99213 OFFICE O/P EST LOW 20 MIN: CPT | Performed by: FAMILY MEDICINE

## 2020-09-02 ASSESSMENT — ENCOUNTER SYMPTOMS
WHEEZING: 0
DIARRHEA: 0
SHORTNESS OF BREATH: 0
NAUSEA: 0
RHINORRHEA: 0
SORE THROAT: 0
CONSTIPATION: 0
VOMITING: 0
ABDOMINAL PAIN: 0

## 2020-09-02 NOTE — PROGRESS NOTES
daily Yes Historical Provider, MD   Multiple Vitamins-Minerals (THERAPEUTIC MULTIVITAMIN-MINERALS) tablet Take 1 tablet by mouth daily Yes Historical Provider, MD   calcium carbonate 600 MG TABS tablet Take 1 tablet by mouth 2 times daily Yes Historical Provider, MD        No Known Allergies    Social History     Tobacco Use    Smoking status: Never Smoker    Smokeless tobacco: Never Used   Substance Use Topics    Alcohol use: Yes     Alcohol/week: 2.0 standard drinks     Types: 2 Glasses of wine per week           Vitals:    09/02/20 0819   BP: 124/78   Pulse: 104   Resp: 16   Temp: 97.8 °F (36.6 °C)   TempSrc: Temporal   SpO2: 98%   Weight: 152 lb (68.9 kg)   Height: 5' 5.5\" (1.664 m)     Estimated body mass index is 24.91 kg/m² as calculated from the following:    Height as of this encounter: 5' 5.5\" (1.664 m). Weight as of this encounter: 152 lb (68.9 kg). Physical Exam  Constitutional:       Appearance: She is well-developed. HENT:      Head: Normocephalic. Eyes:      Conjunctiva/sclera: Conjunctivae normal.      Pupils: Pupils are equal, round, and reactive to light. Cardiovascular:      Rate and Rhythm: Normal rate and regular rhythm. Heart sounds: Normal heart sounds. No murmur. Pulmonary:      Effort: Pulmonary effort is normal.      Breath sounds: Normal breath sounds. No wheezing or rales. Abdominal:      General: Bowel sounds are normal.      Palpations: Abdomen is soft. Tenderness: There is no abdominal tenderness. Neurological:      Mental Status: She is alert. Comments: Cranial nerves grossly intact         ASSESSMENT/PLAN:  Saratha Halsted was seen today for results. Diagnoses and all orders for this visit:    Dysthymia  Improved  AND STABLE on Effexor 37.5 mg daily. No SI/HI. Tolerating well. Acquired hypothyroidism  -     TSH; Future  -     T4, FREE; Future  REPEAT LEVEL PRIOR TO NEXT APT.  CONT SYNTHROID DOSAGE    FLU SHOT IN 1 MONTH      Return in about 4

## 2020-12-10 ENCOUNTER — HOSPITAL ENCOUNTER (OUTPATIENT)
Dept: CT IMAGING | Age: 55
Discharge: HOME | End: 2020-12-12
Payer: COMMERCIAL

## 2020-12-10 ENCOUNTER — OFFICE VISIT (OUTPATIENT)
Dept: PRIMARY CARE CLINIC | Age: 55
End: 2020-12-10
Payer: COMMERCIAL

## 2020-12-10 VITALS
DIASTOLIC BLOOD PRESSURE: 68 MMHG | WEIGHT: 160 LBS | BODY MASS INDEX: 26.66 KG/M2 | TEMPERATURE: 98.8 F | RESPIRATION RATE: 18 BRPM | SYSTOLIC BLOOD PRESSURE: 112 MMHG | HEART RATE: 75 BPM | HEIGHT: 65 IN | OXYGEN SATURATION: 96 %

## 2020-12-10 DIAGNOSIS — R10.2 SUPRAPUBIC ABDOMINAL PAIN: ICD-10-CM

## 2020-12-10 PROCEDURE — 99213 OFFICE O/P EST LOW 20 MIN: CPT | Performed by: FAMILY MEDICINE

## 2020-12-10 PROCEDURE — 74176 CT ABD & PELVIS W/O CONTRAST: CPT

## 2020-12-10 RX ORDER — METRONIDAZOLE 500 MG/1
500 TABLET ORAL 3 TIMES DAILY
Qty: 30 TABLET | Refills: 0 | Status: SHIPPED | OUTPATIENT
Start: 2020-12-10 | End: 2020-12-20

## 2020-12-10 RX ORDER — CEFDINIR 300 MG/1
300 CAPSULE ORAL 2 TIMES DAILY
Qty: 20 CAPSULE | Refills: 0 | Status: SHIPPED | OUTPATIENT
Start: 2020-12-10 | End: 2020-12-20

## 2020-12-10 ASSESSMENT — ENCOUNTER SYMPTOMS
RHINORRHEA: 0
DIARRHEA: 0
SORE THROAT: 0
NAUSEA: 0
CONSTIPATION: 0
SHORTNESS OF BREATH: 0
WHEEZING: 0
VOMITING: 0
ABDOMINAL PAIN: 1

## 2020-12-10 NOTE — PROGRESS NOTES
12/10/2020     Chief Complaint   Patient presents with    Abdominal Pain     diverticulitis flare up     HPI  Yessenia Pagan (:  1965) is a 60 y.o. female, here for evaluation of the following medical concerns:    Patient is a 64-year-old female with past medical history of breast cancer, hypothyroidism, diverticulosis, hypercholesterolemia, vitamin D deficiency, dysthymia who presents today for an acute appointment in regards to concerns for diverticulitis. Patient has been hospitalized in the past in regards to her diverticulitis. For her flareups patient in the past has been placed on Flagyl 500 mg 1 tablet every 8 hours for 10 days and Cefdinir 300mg BID x 10 days. Patient reports that she is having lower abdominal pain that started this last Friday. Patient reports that her bowel movements have been fairly consistent. No change in the color. Similar to previous flare of diverticulitis. Review of Systems   Constitutional: Negative for chills and fever. HENT: Negative for congestion, rhinorrhea and sore throat. Respiratory: Negative for shortness of breath and wheezing. Cardiovascular: Negative for chest pain and leg swelling. Gastrointestinal: Positive for abdominal pain. Negative for constipation, diarrhea, nausea and vomiting. Skin: Negative for rash. Neurological: Negative for light-headedness and headaches. Past Medical History:   Diagnosis Date    Anxiety     Blood transfusion      / ectopic pregnacy    Breast cancer ([facility] [address] 2010    left breast, treated with surgery,radiation and chemo    Diverticulosis     Hypothyroidism     PONV (postoperative nausea and vomiting)     Pure hypercholesterolemia 2018    Thyroid disease        Prior to Visit Medications    Medication Sig Taking?  Authorizing Provider   metroNIDAZOLE (FLAGYL) 500 MG tablet Take 1 tablet by mouth 3 times daily for 10 days Yes Wes Wilson MD   cefdinir (OMNICEF) 300 MG capsule Take 1 capsule by mouth 2 times daily for 10 days Yes Micky Hanks MD   venlafaxine (EFFEXOR XR) 37.5 MG extended release capsule Take 1 capsule by mouth daily Yes Micky Hanks MD   levothyroxine (SYNTHROID) 50 MCG tablet TAKE ONE TABLET BY Yadi Wilson Yes Micky Hanks MD   anastrozole (ARIMIDEX) 1 MG tablet Take 1 mg by mouth daily Yes Historical Provider, MD   Multiple Vitamins-Minerals (THERAPEUTIC MULTIVITAMIN-MINERALS) tablet Take 1 tablet by mouth daily Yes Historical Provider, MD   calcium carbonate 600 MG TABS tablet Take 1 tablet by mouth 2 times daily Yes Historical Provider, MD        No Known Allergies    Social History     Tobacco Use    Smoking status: Never Smoker    Smokeless tobacco: Never Used   Substance Use Topics    Alcohol use: Yes     Alcohol/week: 2.0 standard drinks     Types: 2 Glasses of wine per week           Vitals:    12/10/20 1441   BP: 112/68   Pulse: 75   Resp: 18   Temp: 98.8 °F (37.1 °C)   TempSrc: Temporal   SpO2: 96%   Weight: 160 lb (72.6 kg)   Height: 5' 5\" (1.651 m)     Estimated body mass index is 26.63 kg/m² as calculated from the following:    Height as of this encounter: 5' 5\" (1.651 m). Weight as of this encounter: 160 lb (72.6 kg). Physical Exam  Constitutional:       Appearance: She is well-developed. HENT:      Head: Normocephalic. Eyes:      Conjunctiva/sclera: Conjunctivae normal.      Pupils: Pupils are equal, round, and reactive to light. Cardiovascular:      Rate and Rhythm: Normal rate and regular rhythm. Heart sounds: Normal heart sounds. No murmur. Pulmonary:      Effort: Pulmonary effort is normal.      Breath sounds: Normal breath sounds. No wheezing or rales. Abdominal:      General: Bowel sounds are increased. Tenderness: There is abdominal tenderness in the suprapubic area and left lower quadrant. Neurological:      Mental Status: She is alert.       Comments: Cranial nerves grossly intact ASSESSMENT/PLAN:  Cele Schmidt was seen today for abdominal pain. Diagnoses and all orders for this visit:    Diverticulitis  -     metroNIDAZOLE (FLAGYL) 500 MG tablet; Take 1 tablet by mouth 3 times daily for 10 days  -     cefdinir (OMNICEF) 300 MG capsule; Take 1 capsule by mouth 2 times daily for 10 days  -     CT ABDOMEN PELVIS WO CONTRAST Additional Contrast? None; Future    Diverticulosis of large intestine without hemorrhage  -     metroNIDAZOLE (FLAGYL) 500 MG tablet; Take 1 tablet by mouth 3 times daily for 10 days  -     cefdinir (OMNICEF) 300 MG capsule; Take 1 capsule by mouth 2 times daily for 10 days  -     CT ABDOMEN PELVIS WO CONTRAST Additional Contrast? None; Future    Suprapubic abdominal pain  -     metroNIDAZOLE (FLAGYL) 500 MG tablet; Take 1 tablet by mouth 3 times daily for 10 days  -     cefdinir (OMNICEF) 300 MG capsule; Take 1 capsule by mouth 2 times daily for 10 days  -     CT ABDOMEN PELVIS WO CONTRAST Additional Contrast? None; Future  -     Urinalysis; Future  -     Culture, Urine; Future    Concerns that the patient's symptoms may be due to a flareup of her diverticulitis. Due to her stage history of such we will treat her with antibiotics as noted above. Side effects of antibiotics were briefly reviewed. Patient has tolerated these antibiotics in the past well without any issue. CT scan stat was ordered in regards to her current issues. Will check urine analysis and urine culture. Patient is to start antibiotics today. To the ER with any new or worsening symptoms. Return in about 2 weeks (around 12/24/2020), or if symptoms worsen or fail to improve. An electronicsignature was used to authenticate this note.     --Judy Kovacs MD on 12/10/20 at 2:03 PM EST

## 2020-12-11 LAB
BACTERIA: ABNORMAL /HPF
BILIRUBIN URINE: NEGATIVE
BLOOD, URINE: NEGATIVE
CLARITY: NORMAL
COLOR: YELLOW
CRYSTALS, UA: ABNORMAL /HPF
GLUCOSE URINE: NEGATIVE MG/DL
KETONES, URINE: NEGATIVE MG/DL
LEUKOCYTE ESTERASE, URINE: NEGATIVE
NITRITE, URINE: NEGATIVE
PH UA: 6 (ref 5–9)
PROTEIN UA: NEGATIVE MG/DL
RBC UA: ABNORMAL /HPF (ref 0–2)
SPECIFIC GRAVITY UA: 1.02 (ref 1–1.03)
UROBILINOGEN, URINE: 0.2 E.U./DL
WBC UA: ABNORMAL /HPF (ref 0–5)

## 2020-12-13 LAB — URINE CULTURE, ROUTINE: NORMAL

## 2020-12-21 RX ORDER — VENLAFAXINE HYDROCHLORIDE 37.5 MG/1
37.5 CAPSULE, EXTENDED RELEASE ORAL DAILY
Qty: 90 CAPSULE | Refills: 1 | Status: SHIPPED
Start: 2020-12-21 | End: 2020-12-28

## 2020-12-28 RX ORDER — VENLAFAXINE HYDROCHLORIDE 37.5 MG/1
CAPSULE, EXTENDED RELEASE ORAL
Qty: 90 CAPSULE | Refills: 1 | Status: SHIPPED
Start: 2020-12-28 | End: 2021-01-04 | Stop reason: SDUPTHER

## 2020-12-29 LAB
T4 FREE: 1.29
TSH SERPL DL<=0.05 MIU/L-ACNC: 2.94 UIU/ML

## 2021-01-04 ENCOUNTER — OFFICE VISIT (OUTPATIENT)
Dept: PRIMARY CARE CLINIC | Age: 56
End: 2021-01-04
Payer: COMMERCIAL

## 2021-01-04 VITALS
TEMPERATURE: 98.1 F | HEART RATE: 87 BPM | SYSTOLIC BLOOD PRESSURE: 128 MMHG | DIASTOLIC BLOOD PRESSURE: 78 MMHG | WEIGHT: 159 LBS | OXYGEN SATURATION: 97 % | RESPIRATION RATE: 20 BRPM | BODY MASS INDEX: 25.55 KG/M2 | HEIGHT: 66 IN

## 2021-01-04 DIAGNOSIS — F34.1 DYSTHYMIA: ICD-10-CM

## 2021-01-04 DIAGNOSIS — Z17.0 MALIGNANT NEOPLASM OF LEFT BREAST IN FEMALE, ESTROGEN RECEPTOR POSITIVE, UNSPECIFIED SITE OF BREAST (HCC): ICD-10-CM

## 2021-01-04 DIAGNOSIS — K57.30 DIVERTICULOSIS OF LARGE INTESTINE WITHOUT HEMORRHAGE: ICD-10-CM

## 2021-01-04 DIAGNOSIS — E03.9 ACQUIRED HYPOTHYROIDISM: ICD-10-CM

## 2021-01-04 DIAGNOSIS — E78.00 PURE HYPERCHOLESTEROLEMIA: ICD-10-CM

## 2021-01-04 DIAGNOSIS — C50.912 MALIGNANT NEOPLASM OF LEFT BREAST IN FEMALE, ESTROGEN RECEPTOR POSITIVE, UNSPECIFIED SITE OF BREAST (HCC): ICD-10-CM

## 2021-01-04 DIAGNOSIS — E55.9 VITAMIN D DEFICIENCY: Primary | ICD-10-CM

## 2021-01-04 PROCEDURE — 99214 OFFICE O/P EST MOD 30 MIN: CPT | Performed by: FAMILY MEDICINE

## 2021-01-04 RX ORDER — VENLAFAXINE HYDROCHLORIDE 37.5 MG/1
37.5 CAPSULE, EXTENDED RELEASE ORAL DAILY
Qty: 90 CAPSULE | Refills: 1 | Status: SHIPPED
Start: 2021-01-04 | End: 2021-07-07 | Stop reason: SDUPTHER

## 2021-01-04 RX ORDER — LEVOTHYROXINE SODIUM 0.05 MG/1
50 TABLET ORAL DAILY
Qty: 90 TABLET | Refills: 3 | Status: SHIPPED
Start: 2021-01-04 | End: 2022-01-24

## 2021-01-04 ASSESSMENT — ENCOUNTER SYMPTOMS
DIARRHEA: 0
SHORTNESS OF BREATH: 0
ABDOMINAL PAIN: 0
CONSTIPATION: 0
NAUSEA: 0
VOMITING: 0
SORE THROAT: 0
WHEEZING: 0
RHINORRHEA: 0

## 2021-01-04 NOTE — PROGRESS NOTES
Medication Sig Taking? Authorizing Provider   venlafaxine (EFFEXOR XR) 37.5 MG extended release capsule Take 1 capsule by mouth daily Yes Zahira Weathers MD   levothyroxine (SYNTHROID) 50 MCG tablet Take 1 tablet by mouth Daily Yes Zahira Weathers MD   anastrozole (ARIMIDEX) 1 MG tablet Take 1 mg by mouth daily Yes Historical Provider, MD   Multiple Vitamins-Minerals (THERAPEUTIC MULTIVITAMIN-MINERALS) tablet Take 1 tablet by mouth daily Yes Historical Provider, MD   calcium carbonate 600 MG TABS tablet Take 1 tablet by mouth 2 times daily Yes Historical Provider, MD        No Known Allergies    Social History     Tobacco Use    Smoking status: Never Smoker    Smokeless tobacco: Never Used   Substance Use Topics    Alcohol use: Yes     Alcohol/week: 2.0 standard drinks     Types: 2 Glasses of wine per week           Vitals:    01/04/21 0822   BP: 128/78   Pulse: 87   Resp: 20   Temp: 98.1 °F (36.7 °C)   TempSrc: Temporal   SpO2: 97%   Weight: 159 lb (72.1 kg)   Height: 5' 5.5\" (1.664 m)     Estimated body mass index is 26.06 kg/m² as calculated from the following:    Height as of this encounter: 5' 5.5\" (1.664 m). Weight as of this encounter: 159 lb (72.1 kg). Physical Exam  Constitutional:       Appearance: She is well-developed. HENT:      Head: Normocephalic. Eyes:      Extraocular Movements: Extraocular movements intact. Conjunctiva/sclera: Conjunctivae normal.   Cardiovascular:      Rate and Rhythm: Normal rate and regular rhythm. Heart sounds: Normal heart sounds. No murmur. Pulmonary:      Effort: Pulmonary effort is normal.      Breath sounds: Normal breath sounds. No wheezing or rales. Abdominal:      General: Bowel sounds are normal.      Palpations: Abdomen is soft. Tenderness: There is no abdominal tenderness. Musculoskeletal:      Right lower leg: No edema. Left lower leg: No edema. Neurological:      Mental Status: She is alert. Comments: Cranial nerves grossly intact         ASSESSMENT/PLAN:  Michelle Colorado was seen today for results and discuss medications. Diagnoses and all orders for this visit:    Vitamin D deficiency  Comments:  Cont current supplementation. Orders:  -     Vitamin D 25 Hydroxy; Future    Pure hypercholesterolemia  -     CBC Auto Differential; Future  -     Comprehensive Metabolic Panel; Future  -     Lipid Panel; Future    Acquired hypothyroidism  Comments:  Stable on current dosage of synthroid. Orders:  -     CBC Auto Differential; Future  -     Comprehensive Metabolic Panel; Future  -     T4, Free; Future  -     TSH without Reflex; Future  -     levothyroxine (SYNTHROID) 50 MCG tablet; Take 1 tablet by mouth Daily    Dysthymia  Comments:  Restart Effexor XR 37.5mg daily. Reports being slightly more down since stopping this medication. No SI/HI. Orders:  -     CBC Auto Differential; Future  -     Comprehensive Metabolic Panel; Future    Diverticulosis of large intestine without hemorrhage  Comments:  Pt will see Gen Surg soon. May need surgery. Will need to return for surgical clearance. Orders:  -     CBC Auto Differential; Future  -     Comprehensive Metabolic Panel; Future    Malignant neoplasm of left breast in female, estrogen receptor positive, unspecified site of breast ([facility] [address]  Comments:  Still on anastrozole. Orders:  -     CBC Auto Differential; Future  -     Comprehensive Metabolic Panel; Future    Other orders  -     venlafaxine (EFFEXOR XR) 37.5 MG extended release capsule; Take 1 capsule by mouth daily      Return in about 3 months (around 4/4/2021). An electronicsignature was used to authenticate this note.     --Arcenio Sanchez MD on 1/4/21 at 7:50 AM EST

## 2021-03-25 ENCOUNTER — IMMUNIZATION (OUTPATIENT)
Dept: PRIMARY CARE CLINIC | Age: 56
End: 2021-03-25
Payer: COMMERCIAL

## 2021-03-25 PROCEDURE — 0001A PR IMM ADMN SARSCOV2 30MCG/0.3ML DIL RECON 1ST DOSE: CPT | Performed by: INTERNAL MEDICINE

## 2021-03-25 PROCEDURE — 91300 COVID-19, PFIZER VACCINE 30MCG/0.3ML DOSE: CPT | Performed by: INTERNAL MEDICINE

## 2021-04-07 ENCOUNTER — OFFICE VISIT (OUTPATIENT)
Dept: PRIMARY CARE CLINIC | Age: 56
End: 2021-04-07
Payer: COMMERCIAL

## 2021-04-07 VITALS
WEIGHT: 158 LBS | RESPIRATION RATE: 18 BRPM | DIASTOLIC BLOOD PRESSURE: 70 MMHG | HEART RATE: 71 BPM | TEMPERATURE: 97.7 F | OXYGEN SATURATION: 99 % | SYSTOLIC BLOOD PRESSURE: 108 MMHG | HEIGHT: 66 IN | BODY MASS INDEX: 25.39 KG/M2

## 2021-04-07 DIAGNOSIS — E03.9 ACQUIRED HYPOTHYROIDISM: ICD-10-CM

## 2021-04-07 DIAGNOSIS — K57.30 DIVERTICULOSIS OF LARGE INTESTINE WITHOUT HEMORRHAGE: ICD-10-CM

## 2021-04-07 DIAGNOSIS — F34.1 DYSTHYMIA: ICD-10-CM

## 2021-04-07 DIAGNOSIS — E78.00 PURE HYPERCHOLESTEROLEMIA: ICD-10-CM

## 2021-04-07 DIAGNOSIS — C50.912 MALIGNANT NEOPLASM OF LEFT BREAST IN FEMALE, ESTROGEN RECEPTOR POSITIVE, UNSPECIFIED SITE OF BREAST (HCC): ICD-10-CM

## 2021-04-07 DIAGNOSIS — Z17.0 MALIGNANT NEOPLASM OF LEFT BREAST IN FEMALE, ESTROGEN RECEPTOR POSITIVE, UNSPECIFIED SITE OF BREAST (HCC): ICD-10-CM

## 2021-04-07 DIAGNOSIS — E55.9 VITAMIN D DEFICIENCY: Primary | ICD-10-CM

## 2021-04-07 PROCEDURE — 99214 OFFICE O/P EST MOD 30 MIN: CPT | Performed by: FAMILY MEDICINE

## 2021-04-07 ASSESSMENT — ENCOUNTER SYMPTOMS
CONSTIPATION: 0
RHINORRHEA: 0
ABDOMINAL PAIN: 0
SORE THROAT: 0
SHORTNESS OF BREATH: 0
NAUSEA: 0
WHEEZING: 0
DIARRHEA: 0
VOMITING: 0

## 2021-04-07 NOTE — PROGRESS NOTES
2021     Chief Complaint   Patient presents with    Hypothyroidism     HPI  Erik Wade (:  1965) is a 60 y.o. female, here for evaluation of the following medical concerns:    Patient is a 57-year-old female with a past medical history of hypothyroidism, hyperlipidemia, vitamin D deficiency, dysthymia, malignant neoplasm of the left breast undergoing therapy with anastrozole, and diverticulosis who presents today to follow-up. Anxiety/depression: Patient on venlafaxine 37.5 mg ext release tablets well without any issue.      Patient reports she is stable on Synthroid 50 µg daily without any issues.     Diverticulitis Flare: Chronic changes on CT. Pt has seen w/Gen Surg. History of malignant neoplasm of the breast: Patient will complete her 10 year post cancer treatment regimen 2021. Patient will need annual mammograms. Patient will need a DEXA in . Pt received first COVID, due for second. Only reaction was a small nodule on her arm. Review of Systems   Constitutional: Negative for chills and fever. HENT: Negative for congestion, rhinorrhea and sore throat. Respiratory: Negative for shortness of breath and wheezing. Cardiovascular: Negative for chest pain and leg swelling. Gastrointestinal: Negative for abdominal pain, constipation, diarrhea, nausea and vomiting. Skin: Negative for rash. Neurological: Negative for light-headedness and headaches. Past Medical History:   Diagnosis Date    Anxiety     Blood transfusion      / ectopic pregnacy    Breast cancer ([facility] [address]     left breast, treated with surgery,radiation and chemo    Diverticulosis     Hypothyroidism     PONV (postoperative nausea and vomiting)     Pure hypercholesterolemia 2018    Thyroid disease        Prior to Visit Medications    Medication Sig Taking?  Authorizing Provider   venlafaxine (EFFEXOR XR) 37.5 MG extended release capsule Take 1 capsule by mouth daily Yes

## 2021-04-23 ENCOUNTER — IMMUNIZATION (OUTPATIENT)
Dept: PRIMARY CARE CLINIC | Age: 56
End: 2021-04-23
Payer: COMMERCIAL

## 2021-04-23 PROCEDURE — 0002A COVID-19, PFIZER VACCINE 30MCG/0.3ML DOSE: CPT | Performed by: INTERNAL MEDICINE

## 2021-04-23 PROCEDURE — 91300 COVID-19, PFIZER VACCINE 30MCG/0.3ML DOSE: CPT | Performed by: INTERNAL MEDICINE

## 2021-04-30 ENCOUNTER — OFFICE VISIT (OUTPATIENT)
Dept: FAMILY MEDICINE CLINIC | Age: 56
End: 2021-04-30
Payer: COMMERCIAL

## 2021-04-30 VITALS
TEMPERATURE: 97.1 F | WEIGHT: 158 LBS | SYSTOLIC BLOOD PRESSURE: 132 MMHG | HEART RATE: 135 BPM | BODY MASS INDEX: 25.89 KG/M2 | DIASTOLIC BLOOD PRESSURE: 74 MMHG | OXYGEN SATURATION: 98 %

## 2021-04-30 DIAGNOSIS — J10.1 INFLUENZA A: ICD-10-CM

## 2021-04-30 DIAGNOSIS — J02.0 ACUTE STREPTOCOCCAL PHARYNGITIS: ICD-10-CM

## 2021-04-30 DIAGNOSIS — R07.0 PAIN IN THROAT: Primary | ICD-10-CM

## 2021-04-30 LAB
INFLUENZA A ANTIBODY: POSITIVE
INFLUENZA B ANTIBODY: NEGATIVE
S PYO AG THROAT QL: POSITIVE

## 2021-04-30 PROCEDURE — 99213 OFFICE O/P EST LOW 20 MIN: CPT | Performed by: PHYSICIAN ASSISTANT

## 2021-04-30 PROCEDURE — 87804 INFLUENZA ASSAY W/OPTIC: CPT | Performed by: PHYSICIAN ASSISTANT

## 2021-04-30 PROCEDURE — 87880 STREP A ASSAY W/OPTIC: CPT | Performed by: PHYSICIAN ASSISTANT

## 2021-04-30 RX ORDER — AMOXICILLIN AND CLAVULANATE POTASSIUM 875; 125 MG/1; MG/1
1 TABLET, FILM COATED ORAL 2 TIMES DAILY
Qty: 20 TABLET | Refills: 0 | Status: SHIPPED | OUTPATIENT
Start: 2021-04-30 | End: 2021-05-10

## 2021-04-30 RX ORDER — OSELTAMIVIR PHOSPHATE 75 MG/1
75 CAPSULE ORAL 2 TIMES DAILY
Qty: 10 CAPSULE | Refills: 0 | Status: SHIPPED | OUTPATIENT
Start: 2021-04-30 | End: 2021-05-05

## 2021-04-30 RX ORDER — METHYLPREDNISOLONE 4 MG/1
TABLET ORAL
Qty: 1 KIT | Refills: 0 | Status: SHIPPED
Start: 2021-04-30 | End: 2021-07-07

## 2021-04-30 NOTE — PROGRESS NOTES
21  Ya Watkins : 1965 Sex: female  Age 60 y.o. Subjective:  Chief Complaint   Patient presents with    Pharyngitis     x4d     Congestion    Drainage    Cough         HPI:   Ya Watkins , 60 y.o. female presents to express care for evaluation of sinus congestion, cough, drainage. The patient states that she has had the symptoms over the last couple of days. The patient states that for about 4 days she has had a sore throat. The patient has not really had any fevers or body aches. Her cat sneezed in her face and believes that she may have got sick from this. The patient really notes no other sick contacts around her. The patient not any back pain, flank pain. The patient is eating and drinking normally. ROS:   Unless otherwise stated in this report the patient's positive and negative responses for review of systems for constitutional, eyes, ENT, cardiovascular, respiratory, gastrointestinal, neurological, , musculoskeletal, and integument systems and related systems to the presenting problem are either stated in the history of present illness or were not pertinent or were negative for the symptoms and/or complaints related to the presenting medical problem. Positives and pertinent negatives as per HPI. All others reviewed and are negative.       PMH:     Past Medical History:   Diagnosis Date    Anxiety     Blood transfusion      / ectopic pregnacy    Breast cancer ([facility] [address]     left breast, treated with surgery,radiation and chemo    Diverticulosis     Hypothyroidism     PONV (postoperative nausea and vomiting)     Pure hypercholesterolemia 2018    Thyroid disease        Past Surgical History:   Procedure Laterality Date    BREAST LUMPECTOMY Left     breast    COLONOSCOPY N/A 2020    COLONOSCOPY DIAGNOSTIC performed by Mary Paige MD at [place] [address]       Family History   Problem Relation drainage or discharge noted. No pre- or post-auricular tenderness, erythema, or swelling noted. Nasal congestion, rhinorrhea  Posterior oropharynx shows erythema but no evidence of tonsillar hypertrophy, or exudate. the uvula is midline. No trismus or drooling is noted. Moist mucous membranes. Neck: No anterior/posterior lymphadenopathy noted. No erythema, no masses, no fluctuance or induration noted. No meningeal signs. Cardiovascular: Regular Rate tachycardia  Respiratory: No acute distress, no rhonchi, wheezing or crackles noted. No stridor or retractions are noted. Neurological: A&O x4, normal speech  Psychiatric: Cooperative         Testing:     Results for orders placed or performed in visit on 04/30/21   POCT Influenza A/B   Result Value Ref Range    Influenza A Ab positive     Influenza B Ab negative    POCT rapid strep A   Result Value Ref Range    Strep A Ag Positive (A) None Detected           Medical Decision Making:     Vital signs reviewed    Past medical history reviewed. Allergies reviewed. Medications reviewed. Patient on arrival does not appear to be in any apparent distress or discomfort. The patient has been seen and evaluated. The patient does not appear to be toxic or lethargic. Rapid influenza, strep obtained    Influenza was positive for influenza A. Influenza B negative. Strep positive for strep throat. The patient will be started on Tamiflu, Augmentin, and a Medrol Dosepak. The patient was educated on the proper dosage of motrin and tylenol and the appropriate intervals of each. The patient is to increase fluid intake over the next several days. The patient is to use OTC decongestant as needed. The patient is to return to express care or go directly to the emergency department should any of the signs or symptoms worsen. The patient is to followup with primary care physician in 2-3 days for repeat evaluation.  The patient has no other questions or concerns at this time the patient will be discharged home. Clinical Impression:   Keke Stevenson was seen today for pharyngitis, congestion, drainage and cough. Diagnoses and all orders for this visit:    Pain in throat  -     POCT Influenza A/B  -     POCT rapid strep A    Influenza A    Acute streptococcal pharyngitis    Other orders  -     oseltamivir (TAMIFLU) 75 MG capsule; Take 1 capsule by mouth 2 times daily for 5 days  -     amoxicillin-clavulanate (AUGMENTIN) 875-125 MG per tablet; Take 1 tablet by mouth 2 times daily for 10 days  -     methylPREDNISolone (MEDROL DOSEPACK) 4 MG tablet; Take by mouth. The patient is to call for any concerns or return if any of the signs or symptoms worsen. The patient is to follow-up with PCP in the next 2-3 days for repeat evaluation repeat assessment or go directly to the emergency department.      SIGNATURE: Sheryle Favorite III, PA-C

## 2021-06-11 ENCOUNTER — HOSPITAL ENCOUNTER (OUTPATIENT)
Dept: GENERAL RADIOLOGY | Age: 56
Discharge: HOME | End: 2021-06-13
Payer: COMMERCIAL

## 2021-06-11 DIAGNOSIS — Z12.31 VISIT FOR SCREENING MAMMOGRAM: ICD-10-CM

## 2021-06-11 PROCEDURE — 77063 BREAST TOMOSYNTHESIS BI: CPT

## 2021-06-29 LAB
ALBUMIN SERPL-MCNC: 4.8 G/DL
ALP BLD-CCNC: 66 U/L
ALT SERPL-CCNC: 25 U/L
ANION GAP SERPL CALCULATED.3IONS-SCNC: NORMAL MMOL/L
AST SERPL-CCNC: 24 U/L
BASOPHILS ABSOLUTE: 0.1 /ΜL
BASOPHILS RELATIVE PERCENT: 1 %
BILIRUB SERPL-MCNC: 0.5 MG/DL (ref 0.1–1.4)
BUN BLDV-MCNC: 18 MG/DL
CALCIUM SERPL-MCNC: 9.8 MG/DL
CHLORIDE BLD-SCNC: 104 MMOL/L
CHOLESTEROL, TOTAL: 245 MG/DL
CHOLESTEROL/HDL RATIO: ABNORMAL
CO2: 26 MMOL/L
CREAT SERPL-MCNC: 1.01 MG/DL
EOSINOPHILS ABSOLUTE: 0.2 /ΜL
EOSINOPHILS RELATIVE PERCENT: 4 %
GFR CALCULATED: 62
GLUCOSE BLD-MCNC: 88 MG/DL
HCT VFR BLD CALC: 44 % (ref 36–46)
HDLC SERPL-MCNC: 53 MG/DL (ref 35–70)
HEMOGLOBIN: 14.1 G/DL (ref 12–16)
LDL CHOLESTEROL CALCULATED: 164 MG/DL (ref 0–160)
LYMPHOCYTES ABSOLUTE: 1.4 /ΜL
LYMPHOCYTES RELATIVE PERCENT: 32 %
MCH RBC QN AUTO: 28 PG
MCHC RBC AUTO-ENTMCNC: 32 G/DL
MCV RBC AUTO: 88 FL
MONOCYTES ABSOLUTE: 0.5 /ΜL
MONOCYTES RELATIVE PERCENT: 10 %
NEUTROPHILS ABSOLUTE: 2.3 /ΜL
NEUTROPHILS RELATIVE PERCENT: 53 %
NONHDLC SERPL-MCNC: ABNORMAL MG/DL
PDW BLD-RTO: 13.6 %
PLATELET # BLD: 253 K/ΜL
PMV BLD AUTO: NORMAL FL
POTASSIUM SERPL-SCNC: 4.5 MMOL/L
RBC # BLD: 5.03 10^6/ΜL
SODIUM BLD-SCNC: 144 MMOL/L
T4 FREE: 1.19
TOTAL PROTEIN: 6.9
TRIGL SERPL-MCNC: 152 MG/DL
TSH SERPL DL<=0.05 MIU/L-ACNC: 2.53 UIU/ML
VITAMIN D 25-HYDROXY: 64.8
VITAMIN D2, 25 HYDROXY: NORMAL
VITAMIN D3,25 HYDROXY: NORMAL
VLDLC SERPL CALC-MCNC: 28 MG/DL
WBC # BLD: 4.3 10^3/ML

## 2021-06-30 DIAGNOSIS — E55.9 VITAMIN D DEFICIENCY: ICD-10-CM

## 2021-06-30 DIAGNOSIS — C50.912 MALIGNANT NEOPLASM OF LEFT BREAST IN FEMALE, ESTROGEN RECEPTOR POSITIVE, UNSPECIFIED SITE OF BREAST (HCC): ICD-10-CM

## 2021-06-30 DIAGNOSIS — F34.1 DYSTHYMIA: ICD-10-CM

## 2021-06-30 DIAGNOSIS — E78.00 PURE HYPERCHOLESTEROLEMIA: ICD-10-CM

## 2021-06-30 DIAGNOSIS — E03.9 ACQUIRED HYPOTHYROIDISM: ICD-10-CM

## 2021-06-30 DIAGNOSIS — Z17.0 MALIGNANT NEOPLASM OF LEFT BREAST IN FEMALE, ESTROGEN RECEPTOR POSITIVE, UNSPECIFIED SITE OF BREAST (HCC): ICD-10-CM

## 2021-06-30 DIAGNOSIS — K57.30 DIVERTICULOSIS OF LARGE INTESTINE WITHOUT HEMORRHAGE: ICD-10-CM

## 2021-07-07 ENCOUNTER — OFFICE VISIT (OUTPATIENT)
Dept: PRIMARY CARE CLINIC | Age: 56
End: 2021-07-07
Payer: COMMERCIAL

## 2021-07-07 VITALS
RESPIRATION RATE: 16 BRPM | WEIGHT: 156 LBS | TEMPERATURE: 97.1 F | OXYGEN SATURATION: 96 % | HEIGHT: 66 IN | SYSTOLIC BLOOD PRESSURE: 132 MMHG | BODY MASS INDEX: 25.07 KG/M2 | HEART RATE: 71 BPM | DIASTOLIC BLOOD PRESSURE: 84 MMHG

## 2021-07-07 DIAGNOSIS — E03.9 ACQUIRED HYPOTHYROIDISM: ICD-10-CM

## 2021-07-07 DIAGNOSIS — E55.9 VITAMIN D DEFICIENCY: ICD-10-CM

## 2021-07-07 DIAGNOSIS — C50.912 MALIGNANT NEOPLASM OF LEFT BREAST IN FEMALE, ESTROGEN RECEPTOR POSITIVE, UNSPECIFIED SITE OF BREAST (HCC): ICD-10-CM

## 2021-07-07 DIAGNOSIS — F34.1 DYSTHYMIA: Primary | ICD-10-CM

## 2021-07-07 DIAGNOSIS — K57.30 DIVERTICULOSIS OF LARGE INTESTINE WITHOUT HEMORRHAGE: ICD-10-CM

## 2021-07-07 DIAGNOSIS — E78.00 PURE HYPERCHOLESTEROLEMIA: ICD-10-CM

## 2021-07-07 DIAGNOSIS — Z17.0 MALIGNANT NEOPLASM OF LEFT BREAST IN FEMALE, ESTROGEN RECEPTOR POSITIVE, UNSPECIFIED SITE OF BREAST (HCC): ICD-10-CM

## 2021-07-07 PROCEDURE — 99214 OFFICE O/P EST MOD 30 MIN: CPT | Performed by: FAMILY MEDICINE

## 2021-07-07 RX ORDER — VENLAFAXINE HYDROCHLORIDE 37.5 MG/1
37.5 CAPSULE, EXTENDED RELEASE ORAL DAILY
Qty: 90 CAPSULE | Refills: 1 | Status: SHIPPED
Start: 2021-07-07 | End: 2022-01-24

## 2021-07-07 ASSESSMENT — ENCOUNTER SYMPTOMS
VOMITING: 0
CONSTIPATION: 0
SHORTNESS OF BREATH: 0
ABDOMINAL PAIN: 0
DIARRHEA: 0
NAUSEA: 0
SORE THROAT: 0
WHEEZING: 0
RHINORRHEA: 0

## 2021-10-07 ENCOUNTER — OFFICE VISIT (OUTPATIENT)
Dept: PRIMARY CARE CLINIC | Age: 56
End: 2021-10-07
Payer: COMMERCIAL

## 2021-10-07 VITALS
RESPIRATION RATE: 18 BRPM | SYSTOLIC BLOOD PRESSURE: 132 MMHG | HEIGHT: 66 IN | OXYGEN SATURATION: 97 % | WEIGHT: 157 LBS | BODY MASS INDEX: 25.23 KG/M2 | HEART RATE: 69 BPM | DIASTOLIC BLOOD PRESSURE: 86 MMHG | TEMPERATURE: 97.3 F

## 2021-10-07 DIAGNOSIS — L23.7 ALLERGIC CONTACT DERMATITIS DUE TO PLANTS, EXCEPT FOOD: Primary | ICD-10-CM

## 2021-10-07 PROCEDURE — 99213 OFFICE O/P EST LOW 20 MIN: CPT | Performed by: FAMILY MEDICINE

## 2021-10-07 RX ORDER — METHYLPREDNISOLONE 4 MG/1
TABLET ORAL
Qty: 1 KIT | Refills: 0 | Status: SHIPPED | OUTPATIENT
Start: 2021-10-07 | End: 2021-10-13

## 2021-10-07 RX ORDER — TRIAMCINOLONE ACETONIDE 1 MG/G
CREAM TOPICAL
Qty: 45 G | Refills: 0 | Status: ON HOLD
Start: 2021-10-07 | End: 2022-04-07 | Stop reason: ALTCHOICE

## 2021-10-07 SDOH — ECONOMIC STABILITY: FOOD INSECURITY: WITHIN THE PAST 12 MONTHS, THE FOOD YOU BOUGHT JUST DIDN'T LAST AND YOU DIDN'T HAVE MONEY TO GET MORE.: NEVER TRUE

## 2021-10-07 SDOH — ECONOMIC STABILITY: FOOD INSECURITY: WITHIN THE PAST 12 MONTHS, YOU WORRIED THAT YOUR FOOD WOULD RUN OUT BEFORE YOU GOT MONEY TO BUY MORE.: NEVER TRUE

## 2021-10-07 ASSESSMENT — SOCIAL DETERMINANTS OF HEALTH (SDOH): HOW HARD IS IT FOR YOU TO PAY FOR THE VERY BASICS LIKE FOOD, HOUSING, MEDICAL CARE, AND HEATING?: NOT HARD AT ALL

## 2021-10-07 NOTE — PROGRESS NOTES
10/7/21  Mandy Ahn : 1965 Sex: female  Age 60 y.o. Chief Complaint   Patient presents with   [facility]       HPI: The patient states that they have noticed erythema over her arms, neck, face for the last 5+ days. Exposed to poison ivy over a week ago. The patient states that the area has stayed the same size and the itching is worsening. The area is red and min warm. The patient denies any wounds or breaks in the skin. No trauma or injury. No fever or chills. No nausea, vomiting, malaise and or fatigue. Patient comes for evaluation. ROS:  Const: Positives and pertinent negatives as per HPI. All others reviewed and are negative. Current Outpatient Medications:     methylPREDNISolone (MEDROL DOSEPACK) 4 MG tablet, Take by mouth., Disp: 1 kit, Rfl: 0    triamcinolone (KENALOG) 0.1 % cream, Apply topically 2 times daily. , Disp: 45 g, Rfl: 0    venlafaxine (EFFEXOR XR) 37.5 MG extended release capsule, Take 1 capsule by mouth daily, Disp: 90 capsule, Rfl: 1    levothyroxine (SYNTHROID) 50 MCG tablet, Take 1 tablet by mouth Daily, Disp: 90 tablet, Rfl: 3    Multiple Vitamins-Minerals (THERAPEUTIC MULTIVITAMIN-MINERALS) tablet, Take 1 tablet by mouth daily, Disp: , Rfl:     calcium carbonate 600 MG TABS tablet, Take 1 tablet by mouth 2 times daily, Disp: , Rfl:   No Known Allergies    Past Medical History:   Diagnosis Date    Anxiety     Blood transfusion      / ectopic pregnacy    Breast cancer ([facility] [address] 2010    left breast, treated with surgery,radiation and chemo    Diverticulosis     History of therapeutic radiation     Hx antineoplastic chemo     Hypothyroidism     PONV (postoperative nausea and vomiting)     Pure hypercholesterolemia 2018    Thyroid disease      Past Surgical History:   Procedure Laterality Date    BREAST LUMPECTOMY Left     breast    COLONOSCOPY N/A 2020    COLON[facility]ST[facility] by Bernard [facility]s [facility] at [facility] ENDOSCOPY    ECTOPIC PREGNANCY SURGERY  1981     Family History   Problem Relation Age of Onset    Breast Cancer Mother     Heart Attack Mother     Breast Cancer Paternal Grandmother     Scoliosis Daughter     Heart Disease Sister      Social History     Socioeconomic History    Marital status:      Spouse name: Not on file    Number of children: Not on file    Years of education: Not on file    Highest education level: Not on file   Occupational History    Not on file   Tobacco Use    Smoking status: Never Smoker    Smokeless tobacco: Never Used   Vaping Use    Vaping Use: Never used   Substance and Sexual Activity    Alcohol use: Yes     Alcohol/week: 2.0 standard drinks     Types: 2 Glasses of wine per week    Drug use: No    Sexual activity: Not on file   Other Topics Concern    Not on file   Social History Narrative    Not on file     Social Determinants of Health     Financial Resource Strain: Low Risk     Difficulty of Paying Living Expenses: Not hard at all   Food Insecurity: No Food Insecurity    Worried About [address] in the Last Year: Never true    [facility] in the Last Year: Never true   Transportation Needs:     Lack of Transportation (Medical):      Lack of Transportation (Non-Medical):    Physical Activity:     Days of Exercise per Week:     Minutes of Exercise per Session:    Stress:     Feeling of Stress :    Social Connections:     Frequency of Communication with Friends and Family:     Frequency of Social Gatherings with Friends and Family:     Attends [employer] Services:     Active Member of Clubs or Organizations:     Attends Club or Organization Meetings:     Marital Status:    Intimate Partner Violence:     Fear of Current or Ex-Partner:     Emotionally Abused:     Physically Abused:     Sexually Abused:        Vitals:    10/07/21 1356   BP: 132/86   Pulse: 69   Resp: 18   Temp: 97.3 °F (36.3 °C)   TempSrc: Temporal   SpO2: 97% Weight: 157 lb (71.2 kg)   Height: 5' 5.5\" (1.664 m)       Exam:  Physical Exam  Constitutional:       Appearance: She is well-developed. HENT:      Head: Normocephalic. Eyes:      Extraocular Movements: Extraocular movements intact. Conjunctiva/sclera: Conjunctivae normal.   Cardiovascular:      Rate and Rhythm: Normal rate and regular rhythm. Heart sounds: Normal heart sounds. No murmur heard. Pulmonary:      Effort: Pulmonary effort is normal.      Breath sounds: Normal breath sounds. No wheezing or rales. Abdominal:      General: Bowel sounds are normal.   Skin:     Comments: Dermatitis present throughout the patient's arms bilaterally, neck, shoulders. Neurological:      General: No focal deficit present. Mental Status: She is alert. Comments: Cranial nerves grossly intact   Psychiatric:         Mood and Affect: Mood normal.         Judgment: Judgment normal.         Assessment and Plan:  Nitza Minaya was seen today for poison ivy. Diagnoses and all orders for this visit:    Allergic contact dermatitis due to plants, except food  -     methylPREDNISolone (MEDROL DOSEPACK) 4 MG tablet; Take by mouth. -     triamcinolone (KENALOG) 0.1 % cream; Apply topically 2 times daily. Return in about 1 week (around 10/14/2021), or if symptoms worsen or fail to improve. The above treatment regimen was discussed with the patient at length and all questions in regards to such were answered. Common side effect of steroids was discussed. Patient was advised to proceed to the emergency department with any worsening symptoms. Patient should follow up with her family doctor within 3-5 days if needed.      Roz Cody MD

## 2021-12-23 LAB
ALBUMIN SERPL-MCNC: 4.9 G/DL
ALP BLD-CCNC: 77 U/L
ALT SERPL-CCNC: 31 U/L
ANION GAP SERPL CALCULATED.3IONS-SCNC: NORMAL MMOL/L
AST SERPL-CCNC: 27 U/L
BASOPHILS ABSOLUTE: 0.1 /ΜL
BASOPHILS RELATIVE PERCENT: 1 %
BILIRUB SERPL-MCNC: 0.5 MG/DL (ref 0.1–1.4)
BUN BLDV-MCNC: 16 MG/DL
CALCIUM SERPL-MCNC: 10.5 MG/DL
CHLORIDE BLD-SCNC: 102 MMOL/L
CHOLESTEROL, TOTAL: 268 MG/DL
CHOLESTEROL/HDL RATIO: ABNORMAL
CO2: 26 MMOL/L
CREAT SERPL-MCNC: 1.13 MG/DL
EOSINOPHILS ABSOLUTE: 0.1 /ΜL
EOSINOPHILS RELATIVE PERCENT: 3 %
GFR CALCULATED: 54
GLUCOSE BLD-MCNC: 93 MG/DL
HCT VFR BLD CALC: 44.9 % (ref 36–46)
HDLC SERPL-MCNC: 55 MG/DL (ref 35–70)
HEMOGLOBIN: 15.1 G/DL (ref 12–16)
LDL CHOLESTEROL CALCULATED: 186 MG/DL (ref 0–160)
LYMPHOCYTES ABSOLUTE: 1.5 /ΜL
LYMPHOCYTES RELATIVE PERCENT: 34 %
MCH RBC QN AUTO: 29 PG
MCHC RBC AUTO-ENTMCNC: 33.6 G/DL
MCV RBC AUTO: 86 FL
MONOCYTES ABSOLUTE: 0.4 /ΜL
MONOCYTES RELATIVE PERCENT: 10 %
NEUTROPHILS ABSOLUTE: 2.3 /ΜL
NEUTROPHILS RELATIVE PERCENT: 52 %
NONHDLC SERPL-MCNC: ABNORMAL MG/DL
PDW BLD-RTO: 12.8 %
PLATELET # BLD: 326 K/ΜL
PMV BLD AUTO: NORMAL FL
POTASSIUM SERPL-SCNC: 5.2 MMOL/L
RBC # BLD: 5.2 10^6/ΜL
SODIUM BLD-SCNC: 144 MMOL/L
T4 FREE: 1.2
TOTAL PROTEIN: 7.5
TRIGL SERPL-MCNC: 150 MG/DL
TSH SERPL DL<=0.05 MIU/L-ACNC: 4.3 UIU/ML
VITAMIN D 25-HYDROXY: 55.5
VITAMIN D2, 25 HYDROXY: NORMAL
VITAMIN D3,25 HYDROXY: NORMAL
VLDLC SERPL CALC-MCNC: 27 MG/DL
WBC # BLD: 4.5 10^3/ML

## 2022-01-05 ENCOUNTER — OFFICE VISIT (OUTPATIENT)
Dept: PRIMARY CARE CLINIC | Age: 57
End: 2022-01-05
Payer: COMMERCIAL

## 2022-01-05 VITALS
SYSTOLIC BLOOD PRESSURE: 132 MMHG | DIASTOLIC BLOOD PRESSURE: 86 MMHG | RESPIRATION RATE: 18 BRPM | OXYGEN SATURATION: 99 % | WEIGHT: 159 LBS | HEART RATE: 78 BPM | TEMPERATURE: 97.9 F | HEIGHT: 66 IN | BODY MASS INDEX: 25.55 KG/M2

## 2022-01-05 DIAGNOSIS — Z17.0 MALIGNANT NEOPLASM OF LEFT BREAST IN FEMALE, ESTROGEN RECEPTOR POSITIVE, UNSPECIFIED SITE OF BREAST (HCC): ICD-10-CM

## 2022-01-05 DIAGNOSIS — E78.00 PURE HYPERCHOLESTEROLEMIA: ICD-10-CM

## 2022-01-05 DIAGNOSIS — E03.9 ACQUIRED HYPOTHYROIDISM: ICD-10-CM

## 2022-01-05 DIAGNOSIS — E55.9 VITAMIN D DEFICIENCY: ICD-10-CM

## 2022-01-05 DIAGNOSIS — K57.30 DIVERTICULOSIS OF LARGE INTESTINE WITHOUT HEMORRHAGE: ICD-10-CM

## 2022-01-05 DIAGNOSIS — C50.912 MALIGNANT NEOPLASM OF LEFT BREAST IN FEMALE, ESTROGEN RECEPTOR POSITIVE, UNSPECIFIED SITE OF BREAST (HCC): ICD-10-CM

## 2022-01-05 DIAGNOSIS — F34.1 DYSTHYMIA: Primary | ICD-10-CM

## 2022-01-05 DIAGNOSIS — F34.1 DYSTHYMIA: ICD-10-CM

## 2022-01-05 PROCEDURE — G8484 FLU IMMUNIZE NO ADMIN: HCPCS | Performed by: FAMILY MEDICINE

## 2022-01-05 PROCEDURE — 1036F TOBACCO NON-USER: CPT | Performed by: FAMILY MEDICINE

## 2022-01-05 PROCEDURE — 99214 OFFICE O/P EST MOD 30 MIN: CPT | Performed by: FAMILY MEDICINE

## 2022-01-05 PROCEDURE — G8427 DOCREV CUR MEDS BY ELIG CLIN: HCPCS | Performed by: FAMILY MEDICINE

## 2022-01-05 PROCEDURE — 3017F COLORECTAL CA SCREEN DOC REV: CPT | Performed by: FAMILY MEDICINE

## 2022-01-05 PROCEDURE — G8419 CALC BMI OUT NRM PARAM NOF/U: HCPCS | Performed by: FAMILY MEDICINE

## 2022-01-05 ASSESSMENT — ENCOUNTER SYMPTOMS
CONSTIPATION: 0
NAUSEA: 0
SHORTNESS OF BREATH: 0
VOMITING: 0
WHEEZING: 0
ABDOMINAL PAIN: 0
RHINORRHEA: 0
DIARRHEA: 0
SORE THROAT: 0

## 2022-01-05 ASSESSMENT — PATIENT HEALTH QUESTIONNAIRE - PHQ9
SUM OF ALL RESPONSES TO PHQ QUESTIONS 1-9: 0
1. LITTLE INTEREST OR PLEASURE IN DOING THINGS: 0
SUM OF ALL RESPONSES TO PHQ9 QUESTIONS 1 & 2: 0
2. FEELING DOWN, DEPRESSED OR HOPELESS: 0
SUM OF ALL RESPONSES TO PHQ QUESTIONS 1-9: 0

## 2022-01-05 NOTE — PROGRESS NOTES
2022     Chief Complaint   Patient presents with    Hypothyroidism     HPI  Viola Humphrey (:  1965) is a 60 y.o. female, here for evaluation of the following medical concerns:    Patient is a 77-year-old female with a past medical history of hypothyroidism, hyperlipidemia, vitamin D deficiency, dysthymia, malignant neoplasm of the left breast undergoing therapy with anastrozole, and diverticulosis who presents today to follow-up.     Anxiety/depression: Patient on venlafaxine 37.5 mg ext release tablets well without any issue. Stable. No SI/HI.     Patient reports she is stable on Synthroid 50 µg daily without any issues.     Diverticulitis Flare: Chronic changes on CT. Pt has seen w/Gen Surg.      History of malignant neoplasm of the breast: Patient completed her 10 year post cancer treatment regimen 2021. Patient will need annual mammograms. Patient will need a DEXA in .     Completed COVID-19 vaccine series.     Labs: Pending. Review of Systems   Constitutional: Negative for chills and fever. HENT: Negative for congestion, rhinorrhea and sore throat. Respiratory: Negative for shortness of breath and wheezing. Cardiovascular: Negative for chest pain and leg swelling. Gastrointestinal: Negative for abdominal pain, constipation, diarrhea, nausea and vomiting. Skin: Negative for rash. Neurological: Negative for light-headedness and headaches. Past Medical History:   Diagnosis Date    Anxiety     Blood transfusion      / ectopic pregnacy    Breast cancer ([facility] [address] 2010    left breast, treated with surgery,radiation and chemo    Diverticulosis     History of therapeutic radiation     Hx antineoplastic chemo     Hypothyroidism     PONV (postoperative nausea and vomiting)     Pure hypercholesterolemia 2018    Thyroid disease        Prior to Visit Medications    Medication Sig Taking?  Authorizing Provider   triamcinolone (KENALOG) 0.1 % cream Apply topically 2 times daily. Yes Alicia Church MD   venlafaxine (EFFEXOR XR) 37.5 MG extended release capsule Take 1 capsule by mouth daily Yes Alicia Church MD   levothyroxine (SYNTHROID) 50 MCG tablet Take 1 tablet by mouth Daily Yes Alicia Church MD   Multiple Vitamins-Minerals (THERAPEUTIC MULTIVITAMIN-MINERALS) tablet Take 1 tablet by mouth daily Yes Historical Provider, MD   calcium carbonate 600 MG TABS tablet Take 1 tablet by mouth 2 times daily Yes Historical Provider, MD        No Known Allergies    Social History     Tobacco Use    Smoking status: Never Smoker    Smokeless tobacco: Never Used   Substance Use Topics    Alcohol use: Yes     Alcohol/week: 2.0 standard drinks     Types: 2 Glasses of wine per week           Vitals:    01/05/22 0809   BP: 132/86   Pulse: 78   Resp: 18   Temp: 97.9 °F (36.6 °C)   TempSrc: Temporal   SpO2: 99%   Weight: 159 lb (72.1 kg)   Height: 5' 5.5\" (1.664 m)     Estimated body mass index is 26.06 kg/m² as calculated from the following:    Height as of this encounter: 5' 5.5\" (1.664 m). Weight as of this encounter: 159 lb (72.1 kg). Physical Exam  Constitutional:       Appearance: She is well-developed. HENT:      Head: Normocephalic. Eyes:      Extraocular Movements: Extraocular movements intact. Conjunctiva/sclera: Conjunctivae normal.   Cardiovascular:      Rate and Rhythm: Normal rate and regular rhythm. Heart sounds: Normal heart sounds. No murmur heard. Pulmonary:      Effort: Pulmonary effort is normal.      Breath sounds: Normal breath sounds. No wheezing or rales. Abdominal:      General: Bowel sounds are normal.      Palpations: Abdomen is soft. Tenderness: There is no abdominal tenderness. Musculoskeletal:      Right lower leg: No edema. Left lower leg: No edema. Neurological:      General: No focal deficit present. Mental Status: She is alert.       Comments: Cranial nerves grossly intact Psychiatric:         Mood and Affect: Mood normal.         Judgment: Judgment normal.         ASSESSMENT/PLAN:  Cristy Walters was seen today for hypothyroidism. Diagnoses and all orders for this visit:    Dysthymia  Significantly improved. Would like to perform a trial off of Effexor. Patient advised to take this medication every other day for few days and then discontinue. Patient to call us to notify us of how she is doing. No SI/HI. Acquired hypothyroidism  -     T4, Free; Future  -     TSH without Reflex; Future  Currently on Synthroid. Labs appropriate. Continue current dose. Pure hypercholesterolemia  -     CBC Auto Differential; Future  -     Comprehensive Metabolic Panel; Future  -     Lipid Panel; Future  Cholesterol has significantly increased over time. LDL is 180 approximately. Lifestyle modifications reviewed and advised. Continue flaxseed. Start omega-3 fish oils 3 to 4 g a day. Diverticulosis of large intestine without hemorrhage  Stable. No issues. Does follow with GI as needed. Normal BMs. Malignant neoplasm of left breast in female, estrogen receptor positive, unspecified site of breast [facility])  Following with hematology/oncology. Patient has completed her post    Vitamin D deficiency  -     Vitamin D 25 Hydroxy; Future  Vitamin D appropriate. Continue current supplementation. BMI 25.0-25.9,adult  Lifestyle modifications reviewed and advised. HM up-to-date. See lab section for lab results. Return in about 2 weeks (around 1/19/2022) for Physical, To Discuss Labs Results. An [employer] was used to authenticate this note.     --Sarah Ozuna MD on 1/5/22 at 8:11 AM EST

## 2022-01-23 DIAGNOSIS — F34.1 DYSTHYMIA: ICD-10-CM

## 2022-01-23 DIAGNOSIS — E03.9 ACQUIRED HYPOTHYROIDISM: ICD-10-CM

## 2022-01-24 RX ORDER — VENLAFAXINE HYDROCHLORIDE 37.5 MG/1
CAPSULE, EXTENDED RELEASE ORAL
Qty: 90 CAPSULE | Refills: 1 | Status: SHIPPED
Start: 2022-01-24 | End: 2022-01-26 | Stop reason: SDUPTHER

## 2022-01-24 RX ORDER — LEVOTHYROXINE SODIUM 0.05 MG/1
TABLET ORAL
Qty: 90 TABLET | Refills: 3 | Status: SHIPPED
Start: 2022-01-24 | End: 2022-01-26 | Stop reason: SDUPTHER

## 2022-01-26 RX ORDER — VENLAFAXINE HYDROCHLORIDE 37.5 MG/1
CAPSULE, EXTENDED RELEASE ORAL
Qty: 90 CAPSULE | Refills: 1 | Status: SHIPPED
Start: 2022-01-26 | End: 2022-03-31 | Stop reason: SDUPTHER

## 2022-01-26 RX ORDER — LEVOTHYROXINE SODIUM 0.05 MG/1
TABLET ORAL
Qty: 90 TABLET | Refills: 3 | Status: SHIPPED
Start: 2022-01-26 | End: 2022-03-31

## 2022-03-17 ENCOUNTER — APPOINTMENT (OUTPATIENT)
Dept: ULTRASOUND IMAGING | Age: 57
End: 2022-03-17
Payer: COMMERCIAL

## 2022-03-17 ENCOUNTER — APPOINTMENT (OUTPATIENT)
Dept: CT IMAGING | Age: 57
End: 2022-03-17
Payer: COMMERCIAL

## 2022-03-17 ENCOUNTER — HOSPITAL ENCOUNTER (EMERGENCY)
Age: 57
Discharge: HOME | End: 2022-03-17
Attending: EMERGENCY MEDICINE
Payer: COMMERCIAL

## 2022-03-17 VITALS
HEIGHT: 65 IN | DIASTOLIC BLOOD PRESSURE: 72 MMHG | WEIGHT: 151 LBS | OXYGEN SATURATION: 97 % | TEMPERATURE: 98.4 F | HEART RATE: 68 BPM | RESPIRATION RATE: 16 BRPM | SYSTOLIC BLOOD PRESSURE: 147 MMHG | BODY MASS INDEX: 25.16 KG/M2

## 2022-03-17 DIAGNOSIS — R11.0 NAUSEA: ICD-10-CM

## 2022-03-17 DIAGNOSIS — R19.7 DIARRHEA, UNSPECIFIED TYPE: ICD-10-CM

## 2022-03-17 DIAGNOSIS — R93.89 ENDOMETRIAL THICKENING ON ULTRASOUND: ICD-10-CM

## 2022-03-17 DIAGNOSIS — K57.92 ACUTE DIVERTICULITIS: Primary | ICD-10-CM

## 2022-03-17 DIAGNOSIS — K44.9 HIATAL HERNIA: ICD-10-CM

## 2022-03-17 DIAGNOSIS — N85.8 UTERINE CYST: ICD-10-CM

## 2022-03-17 DIAGNOSIS — R03.0 ELEVATED BLOOD PRESSURE READING: ICD-10-CM

## 2022-03-17 LAB
ALBUMIN SERPL-MCNC: 4.5 G/DL (ref 3.5–5.2)
ALP BLD-CCNC: 79 U/L (ref 35–104)
ALT SERPL-CCNC: 20 U/L (ref 0–32)
ANION GAP SERPL CALCULATED.3IONS-SCNC: 12 MMOL/L (ref 7–16)
AST SERPL-CCNC: 17 U/L (ref 0–31)
BACTERIA: ABNORMAL /HPF
BASOPHILS ABSOLUTE: 0.03 E9/L (ref 0–0.2)
BASOPHILS RELATIVE PERCENT: 0.3 % (ref 0–2)
BILIRUB SERPL-MCNC: 0.5 MG/DL (ref 0–1.2)
BILIRUBIN URINE: NEGATIVE
BLOOD, URINE: NEGATIVE
BUN BLDV-MCNC: 15 MG/DL (ref 6–20)
CALCIUM SERPL-MCNC: 9.8 MG/DL (ref 8.6–10.2)
CHLORIDE BLD-SCNC: 101 MMOL/L (ref 98–107)
CLARITY: CLEAR
CO2: 26 MMOL/L (ref 22–29)
COLOR: YELLOW
CREAT SERPL-MCNC: 1 MG/DL (ref 0.5–1)
EOSINOPHILS ABSOLUTE: 0.02 E9/L (ref 0.05–0.5)
EOSINOPHILS RELATIVE PERCENT: 0.2 % (ref 0–6)
GFR AFRICAN AMERICAN: >60
GFR NON-AFRICAN AMERICAN: 57 ML/MIN/1.73
GLUCOSE BLD-MCNC: 111 MG/DL (ref 74–99)
GLUCOSE URINE: NEGATIVE MG/DL
HCT VFR BLD CALC: 42.9 % (ref 34–48)
HEMOGLOBIN: 14.2 G/DL (ref 11.5–15.5)
IMMATURE GRANULOCYTES #: 0.03 E9/L
IMMATURE GRANULOCYTES %: 0.3 % (ref 0–5)
KETONES, URINE: 15 MG/DL
LACTIC ACID: 0.9 MMOL/L (ref 0.5–2.2)
LEUKOCYTE ESTERASE, URINE: NEGATIVE
LYMPHOCYTES ABSOLUTE: 1.01 E9/L (ref 1.5–4)
LYMPHOCYTES RELATIVE PERCENT: 9.6 % (ref 20–42)
MCH RBC QN AUTO: 28.9 PG (ref 26–35)
MCHC RBC AUTO-ENTMCNC: 33.1 % (ref 32–34.5)
MCV RBC AUTO: 87.2 FL (ref 80–99.9)
MONOCYTES ABSOLUTE: 0.79 E9/L (ref 0.1–0.95)
MONOCYTES RELATIVE PERCENT: 7.5 % (ref 2–12)
NEUTROPHILS ABSOLUTE: 8.59 E9/L (ref 1.8–7.3)
NEUTROPHILS RELATIVE PERCENT: 82.1 % (ref 43–80)
NITRITE, URINE: NEGATIVE
PDW BLD-RTO: 13.2 FL (ref 11.5–15)
PH UA: 6 (ref 5–9)
PLATELET # BLD: 270 E9/L (ref 130–450)
PMV BLD AUTO: 10.4 FL (ref 7–12)
POTASSIUM REFLEX MAGNESIUM: 3.9 MMOL/L (ref 3.5–5)
PROTEIN UA: NEGATIVE MG/DL
RBC # BLD: 4.92 E12/L (ref 3.5–5.5)
RBC UA: ABNORMAL /HPF (ref 0–2)
SODIUM BLD-SCNC: 139 MMOL/L (ref 132–146)
SPECIFIC GRAVITY UA: 1.01 (ref 1–1.03)
TOTAL PROTEIN: 7.7 G/DL (ref 6.4–8.3)
UROBILINOGEN, URINE: 0.2 E.U./DL
WBC # BLD: 10.5 E9/L (ref 4.5–11.5)
WBC UA: ABNORMAL /HPF (ref 0–5)

## 2022-03-17 PROCEDURE — 74177 CT ABD & PELVIS W/CONTRAST: CPT

## 2022-03-17 PROCEDURE — 85025 COMPLETE CBC W/AUTO DIFF WBC: CPT

## 2022-03-17 PROCEDURE — 2580000003 HC RX 258: Performed by: PHYSICIAN ASSISTANT

## 2022-03-17 PROCEDURE — 96374 THER/PROPH/DIAG INJ IV PUSH: CPT

## 2022-03-17 PROCEDURE — 83605 ASSAY OF LACTIC ACID: CPT

## 2022-03-17 PROCEDURE — 80053 COMPREHEN METABOLIC PANEL: CPT

## 2022-03-17 PROCEDURE — 99285 EMERGENCY DEPT VISIT HI MDM: CPT

## 2022-03-17 PROCEDURE — 6360000002 HC RX W HCPCS: Performed by: PHYSICIAN ASSISTANT

## 2022-03-17 PROCEDURE — 6370000000 HC RX 637 (ALT 250 FOR IP): Performed by: EMERGENCY MEDICINE

## 2022-03-17 PROCEDURE — 93975 VASCULAR STUDY: CPT

## 2022-03-17 PROCEDURE — 81001 URINALYSIS AUTO W/SCOPE: CPT

## 2022-03-17 PROCEDURE — 76830 TRANSVAGINAL US NON-OB: CPT

## 2022-03-17 PROCEDURE — 6360000004 HC RX CONTRAST MEDICATION: Performed by: RADIOLOGY

## 2022-03-17 PROCEDURE — 87088 URINE BACTERIA CULTURE: CPT

## 2022-03-17 RX ORDER — MORPHINE SULFATE 4 MG/ML
4 INJECTION, SOLUTION INTRAMUSCULAR; INTRAVENOUS ONCE
Status: COMPLETED | OUTPATIENT
Start: 2022-03-17 | End: 2022-03-17

## 2022-03-17 RX ORDER — 0.9 % SODIUM CHLORIDE 0.9 %
1000 INTRAVENOUS SOLUTION INTRAVENOUS ONCE
Status: COMPLETED | OUTPATIENT
Start: 2022-03-17 | End: 2022-03-17

## 2022-03-17 RX ORDER — ONDANSETRON 4 MG/1
4 TABLET, ORALLY DISINTEGRATING ORAL EVERY 8 HOURS PRN
Qty: 9 TABLET | Refills: 0 | Status: SHIPPED | OUTPATIENT
Start: 2022-03-17 | End: 2022-03-20

## 2022-03-17 RX ORDER — METRONIDAZOLE 500 MG/1
500 TABLET ORAL ONCE
Status: COMPLETED | OUTPATIENT
Start: 2022-03-17 | End: 2022-03-17

## 2022-03-17 RX ORDER — METRONIDAZOLE 500 MG/1
500 TABLET ORAL 3 TIMES DAILY
Qty: 30 TABLET | Refills: 0 | Status: SHIPPED | OUTPATIENT
Start: 2022-03-17 | End: 2022-03-27

## 2022-03-17 RX ORDER — HYDROCODONE BITARTRATE AND ACETAMINOPHEN 5; 325 MG/1; MG/1
1 TABLET ORAL EVERY 8 HOURS PRN
Qty: 9 TABLET | Refills: 0 | Status: SHIPPED | OUTPATIENT
Start: 2022-03-17 | End: 2022-03-20

## 2022-03-17 RX ORDER — CEFDINIR 300 MG/1
300 CAPSULE ORAL ONCE
Status: COMPLETED | OUTPATIENT
Start: 2022-03-17 | End: 2022-03-17

## 2022-03-17 RX ORDER — CEFDINIR 300 MG/1
300 CAPSULE ORAL 2 TIMES DAILY
Qty: 20 CAPSULE | Refills: 0 | Status: SHIPPED | OUTPATIENT
Start: 2022-03-17 | End: 2022-03-27

## 2022-03-17 RX ADMIN — SODIUM CHLORIDE 1000 ML: 9 INJECTION, SOLUTION INTRAVENOUS at 19:59

## 2022-03-17 RX ADMIN — MORPHINE SULFATE 4 MG: 4 INJECTION, SOLUTION INTRAMUSCULAR; INTRAVENOUS at 19:57

## 2022-03-17 RX ADMIN — METRONIDAZOLE 500 MG: 500 TABLET ORAL at 22:12

## 2022-03-17 RX ADMIN — IOPAMIDOL 75 ML: 755 INJECTION, SOLUTION INTRAVENOUS at 20:38

## 2022-03-17 RX ADMIN — CEFDINIR 300 MG: 300 CAPSULE ORAL at 22:12

## 2022-03-17 ASSESSMENT — PAIN SCALES - GENERAL
PAINLEVEL_OUTOF10: 8
PAINLEVEL_OUTOF10: 7
PAINLEVEL_OUTOF10: 7

## 2022-03-18 NOTE — ED PROVIDER NOTES
ED Attending shared visit  CC: Neelam      [facility]  Department of Emergency Medicine   ED  Encounter Note  Admit Date/RoomTime: 3/17/2022  6:47 PM  ED Room:     NAME: Ana M Rojas  : 1965  MRN: [id number]     Chief Complaint:  Abdominal Pain (abd pain and diarrhea x1 week, hx diverticulitis) and Diarrhea    History of Present Illness       Ana M Rojas is a 59 y.o. old female who presents to the emergency department by private vehicle, for persistent shooting, stabbing pain in the lower abdomen with radiation to the rectum which began 1 week(s) prior to arrival.   There has been similar episodes in the past with hx of diverticulitis. She notes she follows with Dr. Abdelrahman Matos for this. Since onset the symptoms have been gradually worsening. The pain is associated with diarrhea. She states that last week she started having the pain but it was coming and going. She also states that she has had intermittent diarrhea that she describes as watery and loose. She denies any blood in her stool or black stools. She denies recent antibiotic use, recent travel, or other sick contacts. The pain is aggravated by laying on her abdomen and relieved by nothing. She denies radiation of the pain. There has been NO back pain, chest pain, shortness of breath, fever, vomiting, constipation, dark/black stools, blood in stool, urinary frequency, dysuria, hematuria, urinary urgency, urinary incontinence, vaginal discharge, or vaginal bleeding. She notes a history of breast cancer but is no longer on any chemotherapy medications. She notes that she still has all of her female pelvic organs. She denies any concerns for STDs. She denies any other significant past medical history. ROS   Pertinent positives and negatives are stated within HPI, all other systems reviewed and are negative.     Past Medical History:  has a past medical history of Anxiety, Blood transfusion, Breast cancer ([address] Diverticulosis, History of therapeutic radiation, Hx antineoplastic chemo, Hypothyroidism, PONV (postoperative nausea and vomiting), Pure hypercholesterolemia, and Thyroid disease. Surgical History has a past surgical history that includes Ectopic pregnancy surgery (1981); Breast lumpectomy (Left, 2010); and Colonoscopy (N/A, 5/21/2020). Social History:  reports that she has never smoked. She has never used smokeless tobacco. She reports current alcohol use of about 2.0 standard drinks of alcohol per week. She reports that she does not use drugs. Family History: family history includes Breast Cancer in her mother and paternal grandmother; Heart Attack in her mother; Heart Disease in her sister; Scoliosis in her daughter. Allergies: Patient has no known allergies. Physical Exam   Oxygen Saturation Interpretation: Normal on room air analysis. ED Triage Vitals   BP Temp Temp Source Pulse Resp SpO2 Height Weight   03/17/22 1837 03/17/22 1837 03/17/22 2003 03/17/22 1837 03/17/22 1837 03/17/22 1837 03/17/22 1837 03/17/22 1837   (!) 148/72 98 °F (36.7 °C) Oral 84 16 99 % 5' 5\" (1.651 m) 151 lb (68.5 kg)        Physical Exam  General Appearance/Constitutional:  Alert, development consistent with age. HEENT:  NC/NT. PERRLA. Airway patent. Neck:  Supple. No lymphadenopathy. Respiratory:  No retractions. Lungs Clear to auscultation and breath sounds equal.  CV:  Regular rate and rhythm. GI:  normal appearing, non-distended with no visible hernias. Bowel sounds: normal bowel sounds. Tenderness: There is mild tenderness present - located medially in the lower abdomen., There is no rebound tenderness. , There is no guarding. , There is no distension. , There is no rigidity. There is no pulsatile mass. .        Liver: non-tender and non-palpable. Spleen:  non-tender and non-palpable. Back: CVA Tenderness: No CVA tenderness.   : /Pelvic examination deferred / declined. Integument:  Normal turgor. Warm, dry, without visible rash, unless noted elsewhere. Lymphatics: No edema, cap.refill <3sec. Neurological:  Orientation age-appropriate. Motor functions intact.     Lab / Imaging Results   (All laboratory and radiology results have been personally reviewed by myself)  Labs:  Results for orders placed or performed during the hospital encounter of 03/17/22   CBC with Auto Differential   Result Value Ref Range    WBC 10.5 4.5 - 11.5 E9/L    RBC 4.92 3.50 - 5.50 E12/L    Hemoglobin 14.2 11.5 - 15.5 g/dL    Hematocrit 42.9 34.0 - 48.0 %    MCV 87.2 80.0 - 99.9 fL    MCH 28.9 26.0 - 35.0 pg    MCHC 33.1 32.0 - 34.5 %    RDW 13.2 11.5 - 15.0 fL    Platelets 706 024 - 270 E9/L    MPV 10.4 7.0 - 12.0 fL    Neutrophils % 82.1 (H) 43.0 - 80.0 %    Immature Granulocytes % 0.3 0.0 - 5.0 %    Lymphocytes % 9.6 (L) 20.0 - 42.0 %    Monocytes % 7.5 2.0 - 12.0 %    Eosinophils % 0.2 0.0 - 6.0 %    Basophils % 0.3 0.0 - 2.0 %    Neutrophils Absolute 8.59 (H) 1.80 - 7.30 E9/L    Immature Granulocytes # 0.03 E9/L    Lymphocytes Absolute 1.01 (L) 1.50 - 4.00 E9/L    Monocytes Absolute 0.79 0.10 - 0.95 E9/L    Eosinophils Absolute 0.02 (L) 0.05 - 0.50 E9/L    Basophils Absolute 0.03 0.00 - 0.20 E9/L   Comprehensive Metabolic Panel w/ Reflex to MG   Result Value Ref Range    Sodium 139 132 - 146 mmol/L    Potassium reflex Magnesium 3.9 3.5 - 5.0 mmol/L    Chloride 101 98 - 107 mmol/L    CO2 26 22 - 29 mmol/L    Anion Gap 12 7 - 16 mmol/L    Glucose 111 (H) 74 - 99 mg/dL    BUN 15 6 - 20 mg/dL    CREATININE 1.0 0.5 - 1.0 mg/dL    GFR Non-African American 57 >=60 mL/min/1.73    GFR African American >60     Calcium 9.8 8.6 - 10.2 mg/dL    Total Protein 7.7 6.4 - 8.3 g/dL    Albumin 4.5 3.5 - 5.2 g/dL    Total Bilirubin 0.5 0.0 - 1.2 mg/dL    Alkaline Phosphatase 79 35 - 104 U/L    ALT 20 0 - 32 U/L    AST 17 0 - 31 U/L   Lactic Acid   Result Value Ref Range    Lactic Acid 0.9 0.5 - 2.2 mmol/L Urinalysis with Microscopic   Result Value Ref Range    Color, UA Yellow Straw/Yellow    Clarity, UA Clear Clear    Glucose, Ur Negative Negative mg/dL    Bilirubin Urine Negative Negative    Ketones, Urine 15 (A) Negative mg/dL    Specific Gravity, UA 1.010 1.005 - 1.030    Blood, Urine Negative Negative    pH, UA 6.0 5.0 - 9.0    Protein, UA Negative Negative mg/dL    Urobilinogen, Urine 0.2 <2.0 E.U./dL    Nitrite, Urine Negative Negative    Leukocyte Esterase, Urine Negative Negative    WBC, UA NONE 0 - 5 /HPF    RBC, UA NONE 0 - 2 /HPF    Bacteria, UA NONE SEEN None Seen /HPF     Imaging: All Radiology results interpreted by Radiologist unless otherwise noted. CT ABDOMEN PELVIS W IV CONTRAST Additional Contrast? None   Final Result   1. Moderate edema along the rectosigmoid colon likely indicating ACUTE   DIVERTICULITIS. Episodes of acute diverticulitis or also observed in this   region on the previous CTs from 12/10/2020 and 03/21/2020.   2. No free air or abscess. 3. Small hiatal hernia. RECOMMENDATIONS:   Unavailable         US NON OB TRANSVAGINAL   Final Result   1. Mild endometrial thickening is suggested, could be medication related with   history of breast cancer, but carcinoma is not excluded. 2. Ovaries were not seen   3. Pelvic free fluid noted         US DUP ABD PEL RETRO SCROT COMPLETE   Final Result   1. Ovaries were not seen. 2. No clear endometrial hyperemia is demonstrated.            ED Course / Medical Decision Making     Medications   0.9 % sodium chloride bolus (0 mLs IntraVENous Stopped 3/17/22 2121)   morphine sulfate (PF) injection 4 mg (4 mg IntraVENous Given 3/17/22 1957)   iopamidol (ISOVUE-370) 76 % injection 75 mL (75 mLs IntraVENous Given 3/17/22 2038)   cefdinir (OMNICEF) capsule 300 mg (300 mg Oral Given 3/17/22 2212)   metroNIDAZOLE (FLAGYL) tablet 500 mg (500 mg Oral Given 3/17/22 2212)        Re-Evaluations:  3/17/22      Time: 2100    Patients condition is thickening on ultrasound    4. Uterine cyst    5. Diarrhea, unspecified type    6. Elevated blood pressure reading    7. Nausea      This patient's ED course included: a personal history and physicial examination, re-evaluation prior to disposition, multiple bedside re-evaluations and complex medical decision making and emergency management  This patient has remained hemodynamically stable and improved during their ED course. Plan   Discharged home  Patient condition is stable. New Medications     New Prescriptions    CEFDINIR (OMNICEF) 300 MG CAPSULE    Take 1 capsule by mouth 2 times daily for 10 days    HYDROCODONE-ACETAMINOPHEN (NORCO) 5-325 MG PER TABLET    Take 1 tablet by mouth every 8 hours as needed for Pain for up to 3 days. Intended supply: 3 days. Take lowest dose possible to manage pain    METRONIDAZOLE (FLAGYL) 500 MG TABLET    Take 1 tablet by mouth 3 times daily for 10 days    ONDANSETRON (ZOFRAN ODT) 4 MG DISINTEGRATING TABLET    Take 1 tablet by mouth every 8 hours as needed for Nausea or Vomiting     Electronically signed by Aurea Schwartz PA-C   DD: 3/17/22  **This report was transcribed using voice recognition software. Every effort was made to ensure accuracy; however, inadvertent computerized transcription errors may be present.   END OF PROVIDER NOTE       Leyla Tamez PA-C  03/17/22 4981

## 2022-03-18 NOTE — ED NOTES
Pt states that she has been having abdominal pain off and on for  The past week, she states taht she has had diarrhea, and states the pain is lower abdominal area, lungs are clear a and p, abdomen I is soft with hypo bowel sounds, 0 edema pedal pulses plus 2 bilateral      Jean Gonsalez RN  03/17/22 2003

## 2022-03-20 LAB — URINE CULTURE, ROUTINE: NORMAL

## 2022-03-31 DIAGNOSIS — F34.1 DYSTHYMIA: ICD-10-CM

## 2022-03-31 DIAGNOSIS — E03.9 ACQUIRED HYPOTHYROIDISM: ICD-10-CM

## 2022-03-31 RX ORDER — LEVOTHYROXINE SODIUM 0.05 MG/1
TABLET ORAL
Qty: 90 TABLET | Refills: 3 | Status: SHIPPED | OUTPATIENT
Start: 2022-03-31

## 2022-03-31 RX ORDER — VENLAFAXINE HYDROCHLORIDE 37.5 MG/1
CAPSULE, EXTENDED RELEASE ORAL
Qty: 90 CAPSULE | Refills: 1 | Status: SHIPPED
Start: 2022-03-31 | End: 2022-07-06 | Stop reason: SDUPTHER

## 2022-04-07 ENCOUNTER — HOSPITAL ENCOUNTER (INPATIENT)
Age: 57
LOS: 7 days | Discharge: HOME HEALTH | DRG: 391 | End: 2022-04-14
Attending: SURGERY | Admitting: SURGERY
Payer: COMMERCIAL

## 2022-04-07 ENCOUNTER — APPOINTMENT (OUTPATIENT)
Dept: CT IMAGING | Age: 57
DRG: 391 | End: 2022-04-07
Attending: SURGERY
Payer: COMMERCIAL

## 2022-04-07 PROBLEM — K57.92 DIVERTICULITIS: Status: ACTIVE | Noted: 2022-04-07

## 2022-04-07 LAB
ANION GAP SERPL CALCULATED.3IONS-SCNC: 11 MMOL/L (ref 7–16)
BUN BLDV-MCNC: 13 MG/DL (ref 6–20)
CALCIUM SERPL-MCNC: 9.1 MG/DL (ref 8.6–10.2)
CHLORIDE BLD-SCNC: 100 MMOL/L (ref 98–107)
CO2: 26 MMOL/L (ref 22–29)
CREAT SERPL-MCNC: 1 MG/DL (ref 0.5–1)
GFR AFRICAN AMERICAN: >60
GFR NON-AFRICAN AMERICAN: 57 ML/MIN/1.73
GLUCOSE BLD-MCNC: 97 MG/DL (ref 74–99)
POTASSIUM REFLEX MAGNESIUM: 3.9 MMOL/L (ref 3.5–5)
SODIUM BLD-SCNC: 137 MMOL/L (ref 132–146)

## 2022-04-07 PROCEDURE — 2580000003 HC RX 258

## 2022-04-07 PROCEDURE — 6360000002 HC RX W HCPCS: Performed by: SURGERY

## 2022-04-07 PROCEDURE — 1200000000 HC SEMI PRIVATE

## 2022-04-07 PROCEDURE — 2580000003 HC RX 258: Performed by: RADIOLOGY

## 2022-04-07 PROCEDURE — 6360000002 HC RX W HCPCS

## 2022-04-07 PROCEDURE — 36415 COLL VENOUS BLD VENIPUNCTURE: CPT

## 2022-04-07 PROCEDURE — 80048 BASIC METABOLIC PNL TOTAL CA: CPT

## 2022-04-07 PROCEDURE — 74177 CT ABD & PELVIS W/CONTRAST: CPT

## 2022-04-07 PROCEDURE — 6360000004 HC RX CONTRAST MEDICATION: Performed by: RADIOLOGY

## 2022-04-07 RX ORDER — SODIUM CHLORIDE 0.9 % (FLUSH) 0.9 %
10 SYRINGE (ML) INJECTION PRN
Status: COMPLETED | OUTPATIENT
Start: 2022-04-07 | End: 2022-04-07

## 2022-04-07 RX ORDER — OXYCODONE HYDROCHLORIDE 5 MG/1
5 TABLET ORAL EVERY 4 HOURS PRN
Status: DISCONTINUED | OUTPATIENT
Start: 2022-04-07 | End: 2022-04-14 | Stop reason: HOSPADM

## 2022-04-07 RX ORDER — ONDANSETRON 2 MG/ML
4 INJECTION INTRAMUSCULAR; INTRAVENOUS EVERY 6 HOURS PRN
Status: DISCONTINUED | OUTPATIENT
Start: 2022-04-07 | End: 2022-04-14 | Stop reason: HOSPADM

## 2022-04-07 RX ORDER — ONDANSETRON 4 MG/1
4 TABLET, ORALLY DISINTEGRATING ORAL EVERY 8 HOURS PRN
Status: DISCONTINUED | OUTPATIENT
Start: 2022-04-07 | End: 2022-04-14 | Stop reason: HOSPADM

## 2022-04-07 RX ORDER — SODIUM CHLORIDE 9 MG/ML
INJECTION, SOLUTION INTRAVENOUS PRN
Status: DISCONTINUED | OUTPATIENT
Start: 2022-04-07 | End: 2022-04-14 | Stop reason: HOSPADM

## 2022-04-07 RX ORDER — ACETAMINOPHEN 325 MG/1
650 TABLET ORAL EVERY 4 HOURS PRN
Status: DISCONTINUED | OUTPATIENT
Start: 2022-04-07 | End: 2022-04-14 | Stop reason: HOSPADM

## 2022-04-07 RX ORDER — SODIUM CHLORIDE, SODIUM LACTATE, POTASSIUM CHLORIDE, CALCIUM CHLORIDE 600; 310; 30; 20 MG/100ML; MG/100ML; MG/100ML; MG/100ML
INJECTION, SOLUTION INTRAVENOUS CONTINUOUS
Status: DISCONTINUED | OUTPATIENT
Start: 2022-04-07 | End: 2022-04-08

## 2022-04-07 RX ORDER — SODIUM CHLORIDE 0.9 % (FLUSH) 0.9 %
5-40 SYRINGE (ML) INJECTION PRN
Status: DISCONTINUED | OUTPATIENT
Start: 2022-04-07 | End: 2022-04-14 | Stop reason: HOSPADM

## 2022-04-07 RX ORDER — SODIUM CHLORIDE 0.9 % (FLUSH) 0.9 %
5-40 SYRINGE (ML) INJECTION EVERY 12 HOURS SCHEDULED
Status: DISCONTINUED | OUTPATIENT
Start: 2022-04-07 | End: 2022-04-14 | Stop reason: HOSPADM

## 2022-04-07 RX ADMIN — PIPERACILLIN SODIUM AND TAZOBACTAM SODIUM 4500 MG: 4; .5 INJECTION, POWDER, LYOPHILIZED, FOR SOLUTION INTRAVENOUS at 14:00

## 2022-04-07 RX ADMIN — PIPERACILLIN AND TAZOBACTAM 3375 MG: 3; .375 INJECTION, POWDER, LYOPHILIZED, FOR SOLUTION INTRAVENOUS at 22:10

## 2022-04-07 RX ADMIN — SODIUM CHLORIDE, PRESERVATIVE FREE 10 ML: 5 INJECTION INTRAVENOUS at 21:42

## 2022-04-07 RX ADMIN — SODIUM CHLORIDE, PRESERVATIVE FREE 10 ML: 5 INJECTION INTRAVENOUS at 13:30

## 2022-04-07 RX ADMIN — IOHEXOL 50 ML: 240 INJECTION, SOLUTION INTRATHECAL; INTRAVASCULAR; INTRAVENOUS; ORAL at 19:45

## 2022-04-07 RX ADMIN — SODIUM CHLORIDE, PRESERVATIVE FREE 10 ML: 5 INJECTION INTRAVENOUS at 14:32

## 2022-04-07 RX ADMIN — SODIUM CHLORIDE, POTASSIUM CHLORIDE, SODIUM LACTATE AND CALCIUM CHLORIDE: 600; 310; 30; 20 INJECTION, SOLUTION INTRAVENOUS at 13:41

## 2022-04-07 RX ADMIN — HYDROMORPHONE HYDROCHLORIDE 0.25 MG: 1 INJECTION, SOLUTION INTRAMUSCULAR; INTRAVENOUS; SUBCUTANEOUS at 18:11

## 2022-04-07 RX ADMIN — IOPAMIDOL 75 ML: 755 INJECTION, SOLUTION INTRAVENOUS at 21:45

## 2022-04-07 ASSESSMENT — PAIN DESCRIPTION - ONSET
ONSET: ON-GOING
ONSET: ON-GOING

## 2022-04-07 ASSESSMENT — PAIN SCALES - GENERAL
PAINLEVEL_OUTOF10: 0
PAINLEVEL_OUTOF10: 0
PAINLEVEL_OUTOF10: 6
PAINLEVEL_OUTOF10: 3

## 2022-04-07 ASSESSMENT — PAIN DESCRIPTION - FREQUENCY
FREQUENCY: CONTINUOUS
FREQUENCY: CONTINUOUS

## 2022-04-07 ASSESSMENT — PAIN DESCRIPTION - PAIN TYPE
TYPE: ACUTE PAIN
TYPE: ACUTE PAIN

## 2022-04-07 ASSESSMENT — PAIN DESCRIPTION - LOCATION
LOCATION: ABDOMEN
LOCATION: ABDOMEN

## 2022-04-07 ASSESSMENT — PAIN DESCRIPTION - DESCRIPTORS
DESCRIPTORS: ACHING;CRAMPING;DISCOMFORT
DESCRIPTORS: ACHING;CRAMPING;DISCOMFORT

## 2022-04-07 ASSESSMENT — PAIN DESCRIPTION - ORIENTATION
ORIENTATION: LEFT;MID;LOWER
ORIENTATION: LEFT;MID;LOWER

## 2022-04-07 NOTE — CONSULTS
GENERAL SURGERY  H&P   4/7/2022    [facility]  Annelise Ovalle is a 59 y.o. female with hx diverticulitis (5-6 episodes) who presented from clinic with LLQ abdominal pain for several days which has been worsening. She was treated last week with PO antibiotics but did not improve and saw Dr. Shahriar Das in clinic. Also states she has been having several days of diarrhea  She denies fevers, weight loss, bloody or dark bowel movements, nausea, vomiting. Her last colonoscopy was < 10 years ago but she states it was normal. Denies anticoagulation     Afebrile        Past Medical History:   Diagnosis Date    Anxiety     Blood transfusion     1981 / ectopic pregnacy    Breast cancer ([facility] [address] 2010    left breast, treated with surgery,radiation and chemo    Diverticulosis     History of therapeutic radiation     Hx antineoplastic chemo     Hypothyroidism     PONV (postoperative nausea and vomiting)     Pure hypercholesterolemia 7/26/2018    Thyroid disease        Past Surgical History:   Procedure Laterality Date    BREAST LUMPECTOMY Left 2010    breast    COLONOSCOPY N/A 5/21/2020    COLONOSCOPY DIAGNOSTIC performed by Tita Vaughn MD at [place] [address]       Medications Prior to Admission    Prior to Admission medications    Medication Sig Start Date End Date Taking?  Authorizing Provider   venlafaxine (EFFEXOR XR) 37.5 MG extended release capsule TAKE 1 CAPSULE BY MOUTH ONCE DAILY 3/31/22   Isaiah Kessler MD   levothyroxine (SYNTHROID) 50 MCG tablet TAKE ONE TABLET BY MOUTH EVERY DAY 3/31/22   Isaiah Ksesler MD   Multiple Vitamins-Minerals (THERAPEUTIC MULTIVITAMIN-MINERALS) tablet Take 1 tablet by mouth daily    Historical Provider, MD   calcium carbonate 600 MG TABS tablet Take 1 tablet by mouth 2 times daily    Historical Provider, MD       No Known Allergies    Family History   Problem Relation Age of Onset    Breast Cancer Mother     Heart Attack Mother     Breast Cancer Paternal Grandmother     Scoliosis Daughter     Heart Disease Sister        Social History     Tobacco Use    Smoking status: Never Smoker    Smokeless tobacco: Never Used   Vaping Use    Vaping Use: Never used   Substance Use Topics    Alcohol use: Yes     Alcohol/week: 2.0 standard drinks     Types: 2 Glasses of wine per week    Drug use: No         Review of Systems: pertinent ROS listed in HPI, all others negative       PHYSICAL EXAM:    Vitals:    04/07/22 1225   BP: 133/62   Pulse: 75   Resp: 16   Temp: 98.8 °F (37.1 °C)   SpO2: 95%       GENERAL:  NAD   HEAD:  Normocephalic. Atraumatic. EYES:   No scleral icterus. PERRL. LUNGS:  No increased work of breathing. CARDIOVASCULAR: RR  ABDOMEN:  Soft, non-distended, tender in LLQ, focal peritonitis and guarding  EXTREMITIES:   MAEx4. Atraumatic. No LE edema. SKIN:  Warm and dry  NEUROLOGIC:  GCS 15  RECTAL: No hemorrhoids.  FOBT -      ASSESSMENT/PLAN:  59 y.o. female with acute diverticulitis after failed trial of PO abx     CTAP to assess for complications   ID consult- appreciate abx recs will continue with zosyn for now   Pain and nausea control as needed  Discussed with patient that as this is her 6th bout of diverticulitis she may wish to consider more definitive management, will discuss with Dr. Endy Finney   NPO  IVF  followup c diff result     Plan discussed with Dr. Susu Stevens MD  Surgery Resident PGY-1  4/7/2022  4:52 PM

## 2022-04-07 NOTE — H&P
GENERAL SURGERY  H&P   4/7/2022    HPI  Lakeshia Hodges is a 59 y.o. female with hx diverticulitis (5-6 episodes) who presented from clinic with LLQ abdominal pain for several days which has been worsening. She was treated last week with PO antibiotics but did not improve and saw Dr. Isaiah Feliz in clinic. Also states she has been having several days of diarrhea  She denies fevers, weight loss, bloody or dark bowel movements, nausea, vomiting. Her last colonoscopy was < 10 years ago but she states it was normal. Denies anticoagulation     Afebrile, hemodynamically stable. Past Medical History:   Diagnosis Date    Anxiety     Blood transfusion     1981 / ectopic pregnacy    Breast cancer ([facility] [address] 2010    left breast, treated with surgery,radiation and chemo    Diverticulosis     History of therapeutic radiation     Hx antineoplastic chemo     Hypothyroidism     PONV (postoperative nausea and vomiting)     Pure hypercholesterolemia 7/26/2018    Thyroid disease        Past Surgical History:   Procedure Laterality Date    BREAST LUMPECTOMY Left 2010    breast    COLONOSCOPY N/A 5/21/2020    COLONOSCOPY DIAGNOSTIC performed by Amparo Mcelroy MD at [place] [address]       Medications Prior to Admission    Prior to Admission medications    Medication Sig Start Date End Date Taking?  Authorizing Provider   venlafaxine (EFFEXOR XR) 37.5 MG extended release capsule TAKE 1 CAPSULE BY MOUTH ONCE DAILY 3/31/22   Cari Nieto MD   levothyroxine (SYNTHROID) 50 MCG tablet TAKE ONE TABLET BY MOUTH EVERY DAY 3/31/22   Cari Nieto MD   Multiple Vitamins-Minerals (THERAPEUTIC MULTIVITAMIN-MINERALS) tablet Take 1 tablet by mouth daily    Historical Provider, MD   calcium carbonate 600 MG TABS tablet Take 1 tablet by mouth 2 times daily    Historical Provider, MD       No Known Allergies    Family History   Problem Relation Age of Onset    Breast Cancer Mother     Heart Attack Mother     Breast Cancer Paternal Grandmother     Scoliosis Daughter     Heart Disease Sister        Social History     Tobacco Use    Smoking status: Never Smoker    Smokeless tobacco: Never Used   Vaping Use    Vaping Use: Never used   Substance Use Topics    Alcohol use: Yes     Alcohol/week: 2.0 standard drinks     Types: 2 Glasses of wine per week    Drug use: No         Review of Systems: pertinent ROS listed in HPI, all others negative       PHYSICAL EXAM:    Vitals:    04/07/22 1225   BP: 133/62   Pulse: 75   Resp: 16   Temp: 98.8 °F (37.1 °C)   SpO2: 95%       GENERAL:  NAD   HEAD:  Normocephalic. Atraumatic. EYES:   No scleral icterus. PERRL. LUNGS:  No increased work of breathing. CARDIOVASCULAR: RR  ABDOMEN:  Soft, non-distended, tender in LLQ, focal peritonitis and guarding  EXTREMITIES:   MAEx4. Atraumatic. No LE edema. SKIN:  Warm and dry  NEUROLOGIC:  GCS 15  RECTAL: No hemorrhoids.  FOBT -      ASSESSMENT/PLAN:  59 y.o. female with acute diverticulitis after failed trial of PO abx     CTAP to assess for complications   ID consult- appreciate abx recs will continue with zosyn for now   Pain and nausea control as needed  Discussed with patient that as this is her 6th bout of diverticulitis she may wish to consider more definitive management, will discuss with Dr. Louisa Angela   NPO  IVF  followup c diff result     Plan discussed with Dr. Pamela Marie, DO  Surgery Resident PGY-2  4/7/2022  5:24 PM

## 2022-04-07 NOTE — PLAN OF CARE
Problem: DAILY CARE  Goal: Daily care needs are met  Outcome: Met This Shift     Problem: PAIN  Goal: Patient's pain/discomfort is manageable  Outcome: Met This Shift     Problem: SKIN INTEGRITY  Goal: Skin integrity is maintained or improved  Outcome: Met This Shift     Problem: DISCHARGE BARRIERS  Goal: Patient's continuum of care needs are met  Outcome: Met This Shift

## 2022-04-08 LAB
BASOPHILS ABSOLUTE: 0.04 E9/L (ref 0–0.2)
BASOPHILS RELATIVE PERCENT: 0.5 % (ref 0–2)
EOSINOPHILS ABSOLUTE: 0.07 E9/L (ref 0.05–0.5)
EOSINOPHILS RELATIVE PERCENT: 0.9 % (ref 0–6)
HCT VFR BLD CALC: 36.5 % (ref 34–48)
HEMOGLOBIN: 12.1 G/DL (ref 11.5–15.5)
IMMATURE GRANULOCYTES #: 0.03 E9/L
IMMATURE GRANULOCYTES %: 0.4 % (ref 0–5)
LYMPHOCYTES ABSOLUTE: 1.86 E9/L (ref 1.5–4)
LYMPHOCYTES RELATIVE PERCENT: 24.1 % (ref 20–42)
MCH RBC QN AUTO: 28.7 PG (ref 26–35)
MCHC RBC AUTO-ENTMCNC: 33.2 % (ref 32–34.5)
MCV RBC AUTO: 86.7 FL (ref 80–99.9)
MONOCYTES ABSOLUTE: 0.71 E9/L (ref 0.1–0.95)
MONOCYTES RELATIVE PERCENT: 9.2 % (ref 2–12)
NEUTROPHILS ABSOLUTE: 5 E9/L (ref 1.8–7.3)
NEUTROPHILS RELATIVE PERCENT: 64.9 % (ref 43–80)
PDW BLD-RTO: 13.2 FL (ref 11.5–15)
PLATELET # BLD: 261 E9/L (ref 130–450)
PMV BLD AUTO: 10.5 FL (ref 7–12)
RBC # BLD: 4.21 E12/L (ref 3.5–5.5)
WBC # BLD: 7.7 E9/L (ref 4.5–11.5)

## 2022-04-08 PROCEDURE — 6360000002 HC RX W HCPCS: Performed by: SPECIALIST

## 2022-04-08 PROCEDURE — 6360000002 HC RX W HCPCS: Performed by: SURGERY

## 2022-04-08 PROCEDURE — 6360000002 HC RX W HCPCS

## 2022-04-08 PROCEDURE — 2580000003 HC RX 258: Performed by: SPECIALIST

## 2022-04-08 PROCEDURE — 85025 COMPLETE CBC W/AUTO DIFF WBC: CPT

## 2022-04-08 PROCEDURE — 36415 COLL VENOUS BLD VENIPUNCTURE: CPT

## 2022-04-08 PROCEDURE — 1200000000 HC SEMI PRIVATE

## 2022-04-08 PROCEDURE — 2580000003 HC RX 258

## 2022-04-08 PROCEDURE — 6370000000 HC RX 637 (ALT 250 FOR IP)

## 2022-04-08 RX ORDER — VENLAFAXINE HYDROCHLORIDE 37.5 MG/1
37.5 CAPSULE, EXTENDED RELEASE ORAL
Status: DISCONTINUED | OUTPATIENT
Start: 2022-04-08 | End: 2022-04-14 | Stop reason: HOSPADM

## 2022-04-08 RX ORDER — FLUCONAZOLE 2 MG/ML
400 INJECTION, SOLUTION INTRAVENOUS EVERY 24 HOURS
Status: DISCONTINUED | OUTPATIENT
Start: 2022-04-08 | End: 2022-04-14 | Stop reason: HOSPADM

## 2022-04-08 RX ORDER — LEVOTHYROXINE SODIUM 0.05 MG/1
50 TABLET ORAL DAILY
Status: DISCONTINUED | OUTPATIENT
Start: 2022-04-08 | End: 2022-04-14 | Stop reason: HOSPADM

## 2022-04-08 RX ADMIN — HYDROMORPHONE HYDROCHLORIDE 0.25 MG: 1 INJECTION, SOLUTION INTRAMUSCULAR; INTRAVENOUS; SUBCUTANEOUS at 11:35

## 2022-04-08 RX ADMIN — SODIUM CHLORIDE, POTASSIUM CHLORIDE, SODIUM LACTATE AND CALCIUM CHLORIDE: 600; 310; 30; 20 INJECTION, SOLUTION INTRAVENOUS at 05:32

## 2022-04-08 RX ADMIN — VENLAFAXINE HYDROCHLORIDE 37.5 MG: 37.5 CAPSULE, EXTENDED RELEASE ORAL at 11:32

## 2022-04-08 RX ADMIN — ENOXAPARIN SODIUM 40 MG: 40 INJECTION SUBCUTANEOUS at 11:31

## 2022-04-08 RX ADMIN — PIPERACILLIN AND TAZOBACTAM 3375 MG: 3; .375 INJECTION, POWDER, LYOPHILIZED, FOR SOLUTION INTRAVENOUS at 05:30

## 2022-04-08 RX ADMIN — FLUCONAZOLE 400 MG: 2 INJECTION, SOLUTION INTRAVENOUS at 11:38

## 2022-04-08 RX ADMIN — PIPERACILLIN SODIUM AND TAZOBACTAM SODIUM 4500 MG: 4; .5 INJECTION, POWDER, LYOPHILIZED, FOR SOLUTION INTRAVENOUS at 22:30

## 2022-04-08 RX ADMIN — PIPERACILLIN SODIUM AND TAZOBACTAM SODIUM 4500 MG: 4; .5 INJECTION, POWDER, LYOPHILIZED, FOR SOLUTION INTRAVENOUS at 13:49

## 2022-04-08 RX ADMIN — LEVOTHYROXINE SODIUM 50 MCG: 50 TABLET ORAL at 11:31

## 2022-04-08 RX ADMIN — SODIUM CHLORIDE, PRESERVATIVE FREE 10 ML: 5 INJECTION INTRAVENOUS at 22:30

## 2022-04-08 RX ADMIN — ONDANSETRON 4 MG: 2 INJECTION INTRAMUSCULAR; INTRAVENOUS at 11:45

## 2022-04-08 ASSESSMENT — PAIN SCALES - GENERAL: PAINLEVEL_OUTOF10: 4

## 2022-04-08 NOTE — CARE COORDINATION
Met with patient about diagnosis and discharge plan of care. Pt admit for acute diverticulitis. Currently on iv fluids, iv antibiotics. Pt NPO. Currently on iv zosyn. Pt lives with spouse, independent prior to admit. Plan is home with no needs. Tentative referral given to Brandee at [facility] for possible iv antibiotics.  Will follow-pauletteo

## 2022-04-08 NOTE — HOME CARE
[facility] IV PRICING:  Caity Gamez % COVERAGE  Dain Kayser LPN, [facility] with patient and verified demographics and reviewed IV pricing and patient is in agreement and willing to learn IVAT infusion  Dain Kayser LPN, [facility]

## 2022-04-08 NOTE — CONSULTS
[address] [place] [address] [facility]    Consultation Note     Admit Date: 4/7/2022 12:04 PM    Reason for Consult:   Complicated diverticulitis    Attending Physician:  Eli Crump MD     Chief Complaint: Abdominal pain and failure of oral antimicrobials    HISTORY OF PRESENT ILLNESS:   The patient is a 59 y.o. not known to the Infectious Diseases service. The patient is is admitted through the emergency room with persistent abdominal pain. Stabbing shooting in the lower quadrant with radiation to the rectal region. Process been going on for about a week and previously she had a similar episode with a diagnosis of diverticulitis. Is been loose bowels associated also with this process. There is cramping and subsequent loose movements. Patient had been on p.o. antibiotics previously prior to admission. She was started on Zosyn. Labs indicate the BUN and creatinine 13 and 1.0 with a white count of 7.7 and a hemoglobin of 12. Patient had a CT scan on 7 April 2022. Compared this 2-3 17 2022 scan. The new scan shows extensive disease in the sigmoid rectosigmoid colon comparable to her complaints with worsening compared to the previous scan. In the pelvis there is a phlegmon.   Prior to admission she was on cefdinir and Flagyl          Past Medical History:        Diagnosis Date    Anxiety     Blood transfusion     1981 / ectopic pregnacy    Breast cancer ([address] 2010    left breast, treated with surgery,radiation and chemo    Diverticulosis     History of therapeutic radiation     Hx antineoplastic chemo     Hypothyroidism     PONV (postoperative nausea and vomiting)     Pure hypercholesterolemia 7/26/2018    Thyroid disease      Past Surgical History:        Procedure Laterality Date    BREAST LUMPECTOMY Left 2010    breast    COLONOSCOPY N/A 5/21/2020    COLONOSCOPY DIAGNOSTIC performed by Eli Crump MD at [place] [id number]     Current Medications:   Scheduled Meds:   enoxaparin  40 mg SubCUTAneous Daily    levothyroxine  50 mcg Oral Daily    venlafaxine  37.5 mg Oral Daily with breakfast    sodium chloride flush  5-40 mL IntraVENous 2 times per day    piperacillin-tazobactam  3,375 mg IntraVENous Q8H     Continuous Infusions:   sodium chloride      lactated ringers 125 mL/hr at 04/08/22 0532     PRN Meds:sodium chloride flush, sodium chloride, ondansetron **OR** ondansetron, acetaminophen, oxyCODONE, HYDROmorphone, HYDROmorphone    Allergies:  Patient has no known allergies. Social History:   Social History     Socioeconomic History    Marital status:      Spouse name: Not on file    Number of children: Not on file    Years of education: Not on file    Highest education level: Not on file   Occupational History    Not on file   Tobacco Use    Smoking status: Never Smoker    Smokeless tobacco: Never Used   Vaping Use    Vaping Use: Never used   Substance and Sexual Activity    Alcohol use: Yes     Alcohol/week: 2.0 standard drinks     Types: 2 Glasses of wine per week    Drug use: No    Sexual activity: Not on file   Other Topics Concern    Not on file   Social History Narrative    Not on file     Social Determinants of Health     Financial Resource Strain: Low Risk     Difficulty of Paying Living Expenses: Not hard at all   Food Insecurity: No Food Insecurity    Worried About [facility] in the Last Year: Never true    [facility] in the Last Year: Never true   Transportation Needs:     Lack of Transportation (Medical): Not on file    Lack of Transportation (Non-Medical):  Not on file   Physical Activity:     Days of Exercise per Week: Not on file    Minutes of Exercise per Session: Not on file   Stress:     Feeling of Stress : Not on file   Social Connections:     Frequency of Communication with Friends and Family: Not on file    Frequency of Social Gatherings with Friends and Family: Not on file    Attends [employer] Services: Not on file    Active Member of Clubs or Organizations: Not on file    Attends Club or Organization Meetings: Not on file    Marital Status: Not on file   Intimate Partner Violence:     Fear of Current or Ex-Partner: Not on file    Emotionally Abused: Not on file    Physically Abused: Not on file    Sexually Abused: Not on file   Housing Stability:     Unable to Pay for Housing in the Last Year: Not on file    Number of [place] in the Last Year: Not on file    Unstable Housing in the Last Year: Not on file     Tobacco: No  Alcohol: No  Pets: 3 dogs and 2 cats  Travel: No    Family History:       Problem Relation Age of Onset    Breast Cancer Mother     Heart Attack Mother     Breast Cancer Paternal Grandmother     Scoliosis Daughter     Heart Disease Sister    . Otherwise non-pertinent to the chief complaint. REVIEW OF SYSTEMS:    CONSTITUTIONAL:  No chills, fevers or night sweats. No loss of weight. EYES:  No double vision or drainage from eyes, ears or throat. HEENT:  No neck stiffness. No dysphagia. No drainage from eyes, ears or throat  RESPIRATORY:  No cough, productive sputum or hemoptysis. CARDIOVASCULAR:  No chest pain, palpitations, orthopnea or dyspnea on exertion. GASTROINTESTINAL: See history of present illness  GENITOURINARY:  No frequency burning dysuria or hematuria. INTEGUMENT/BREAST:  No rash or breast masses. Lumpectomy  HEMATOLOGIC/LYMPHATIC:  No lymphadenopathy or blood dyscrasics. ALLERGIC/IMMUNOLOGIC:  No anaphylaxis. ENDOCRINE:  No polyuria or polydipsia or temperature intolerance. MUSCULOSKELETAL:  No myalgia or arthralgia. Full ROM. NEUROLOGICAL:  No focal motor sensory deficit. BEHAVIOR/PSYCH:  No psychosis.      PHYSICAL EXAM:    Vitals:    BP (!) 149/77   Pulse 73   Temp 98.1 °F (36.7 °C) (Oral)   Resp 18   Ht 5' 5\" (1.651 m)   Wt 151 lb (68.5 kg)   LMP 02/10/2011   SpO2 98%   BMI 25.13 kg/m² Constitutional: The patient is awake, alert, and oriented. Skin: Warm and dry. No rashes were noted. No jaundice. HEENT: Eyes show round, and reactive pupils. Moist mucous membranes, no ulcerations, no thrush. Neck: Supple to movements. No lymphadenopathy. Chest: No use of accessory muscles to breathe. Symmetrical expansion. Auscultation reveals no wheezing, crackles, or rhonchi. Cardiovascular: S1 and S2 are rhythmic and regular. No murmurs appreciated. Abdomen: Positive bowel sounds to auscultation. No masses felt. No hepatosplenomegaly. Suprapubic area has the most exquisite pain but also there is pain above that as well as left lower quadrant. Genitourinary: Female  Extremities: No clubbing, no cyanosis, no edema.   Musculoskeletal: Equal and symmetric  Neurological: No focal  Lines: peripheral      CBC+dif:  Recent Labs     04/08/22  0215   WBC 7.7   HGB 12.1   HCT 36.5   MCV 86.7      NEUTROABS 5.00     No results found for: CRP  No results found for: CRPHS  No results found for: SEDRATE  Lab Results   Component Value Date    ALT 20 03/17/2022    AST 17 03/17/2022    ALKPHOS 79 03/17/2022    BILITOT 0.5 03/17/2022     Lab Results   Component Value Date     04/07/2022    K 3.9 04/07/2022     04/07/2022    CO2 26 04/07/2022    BUN 13 04/07/2022    CREATININE 1.0 04/07/2022    GFRAA >60 04/07/2022    LABGLOM 57 04/07/2022    GLUCOSE 97 04/07/2022    GLUCOSE 79 12/22/2010    PROT 7.7 03/17/2022    LABALBU 4.5 03/17/2022    LABALBU 4.3 12/22/2010    CALCIUM 9.1 04/07/2022    BILITOT 0.5 03/17/2022    ALKPHOS 79 03/17/2022    AST 17 03/17/2022    ALT 20 03/17/2022       No results found for: PROTIME, INR    Lab Results   Component Value Date    TSH 4.30 12/22/2021       Lab Results   Component Value Date    COLORU Yellow 03/17/2022    PHUR 6.0 03/17/2022    WBCUA NONE 03/17/2022    RBCUA NONE 03/17/2022    BACTERIA NONE SEEN 03/17/2022    CLARITYU Clear 03/17/2022    SPECGRAV 1.010 03/17/2022    LEUKOCYTESUR Negative 03/17/2022    UROBILINOGEN 0.2 03/17/2022    BILIRUBINUR Negative 03/17/2022    BILIRUBINUR Negative 07/17/2018    BLOODU Negative 03/17/2022    GLUCOSEU Negative 03/17/2022       No results found for: [place], A6DPGQHM, PHART, THGBART, UMT3YAY, PO2ART, XKZ6VGN  Radiology:  CT ABDOMEN PELVIS W IV CONTRAST Additional Contrast? Oral   Final Result   Extensive thickening of the sigmoid and rectosigmoid colon appearing similar   to slightly worsened from previous and again thought to represent acute   diverticulitis. Infiltrating neoplastic process less likely. Worsening fluid in the pelvis with findings concerning for phlegmon and   possible developing abscesses. A particular somewhat localized fluid   collection measures up to 3 cm and does not contain gas but is concerning for   developing abscess. Continued follow-up recommended. Moderate to large stool burden. RECOMMENDATIONS:   Unavailable             Microbiology:  Pending  No results for input(s): BC in the last 72 hours. No results for input(s): ORG in the last 72 hours. No results for input(s): [place] in the last 72 hours. No results for input(s): STREPNEUMAGU in the last 72 hours. No results for input(s): LP1UAG in the last 72 hours. No results for input(s): ASO in the last 72 hours. No results for input(s): CULTRESP in the last 72 hours. Assessment:  · Diverticulitis with phlegmon  · Follow-up for abscess formation    Plan:    · Cont Zosyn 4.5 g IV every 8 extended infusion  · Diflucan 400 IV daily  · Check cultures  · Baseline ESR, CRP  · Monitor labs  · Will follow with you    Thank you for having us see this patient in consultation. I will be discussing this case with the treating physicians.       Electronically signed by Matias Mackay MD on 4/8/2022 at 10:17 AM

## 2022-04-08 NOTE — PROGRESS NOTES
GENERAL SURGERY  DAILY PROGRESS NOTE  4/8/2022    CHIEF COMPLAINT:  No chief complaint on file. SUBJECTIVE:  Improved today Pain has been well controlled, small BM with just mucous. CTAP with phlegmon and inflammatory changes without any formed abscess at this time. Denies fevers, n/v overnight. Tolerating diet -- Diet NPO Exceptions are: Sips of Water with Meds    OBJECTIVE:  BP (!) 146/80   Pulse 74   Temp 98.2 °F (36.8 °C) (Oral)   Resp 18   Ht 5' 5\" (1.651 m)   Wt 151 lb (68.5 kg)   LMP 02/10/2011   SpO2 95%   BMI 25.13 kg/m²     GENERAL:  NAD. A&Ox3. LUNGS:  No increased work of breathing. CARDIOVASCULAR: RR  ABDOMEN:  Soft, non-distended, LLQ tenderness . No guarding, rigidity, rebound.     ASSESSMENT/PLAN:  59 y.o. female recurrent diverticulitis     ID consult- appreciate recs will continue with zosyn for now   Pain and nausea control as needed  Discussed with patient that as this is her 6th bout of diverticulitis she may wish to consider more definitive management, will discuss with Dr. Jose De Jesus Zazueta   NPO  IVF  followup c diff result     Oliverio Riley MD  Surgery Resident PGY-1  4/8/2022  5:43 AM      The patient was seen and examined  The chart was reviewed  Agree with the assessment and plan

## 2022-04-09 LAB
ANION GAP SERPL CALCULATED.3IONS-SCNC: 9 MMOL/L (ref 7–16)
BASOPHILS ABSOLUTE: 0.04 E9/L (ref 0–0.2)
BASOPHILS RELATIVE PERCENT: 0.7 % (ref 0–2)
BUN BLDV-MCNC: 9 MG/DL (ref 6–20)
CALCIUM SERPL-MCNC: 9 MG/DL (ref 8.6–10.2)
CHLORIDE BLD-SCNC: 103 MMOL/L (ref 98–107)
CO2: 26 MMOL/L (ref 22–29)
CREAT SERPL-MCNC: 1.2 MG/DL (ref 0.5–1)
EOSINOPHILS ABSOLUTE: 0.12 E9/L (ref 0.05–0.5)
EOSINOPHILS RELATIVE PERCENT: 2.1 % (ref 0–6)
GFR AFRICAN AMERICAN: 56
GFR NON-AFRICAN AMERICAN: 46 ML/MIN/1.73
GLUCOSE BLD-MCNC: 107 MG/DL (ref 74–99)
HCT VFR BLD CALC: 38 % (ref 34–48)
HEMOGLOBIN: 12.3 G/DL (ref 11.5–15.5)
IMMATURE GRANULOCYTES #: 0.02 E9/L
IMMATURE GRANULOCYTES %: 0.3 % (ref 0–5)
LYMPHOCYTES ABSOLUTE: 1.26 E9/L (ref 1.5–4)
LYMPHOCYTES RELATIVE PERCENT: 22 % (ref 20–42)
MCH RBC QN AUTO: 28.5 PG (ref 26–35)
MCHC RBC AUTO-ENTMCNC: 32.4 % (ref 32–34.5)
MCV RBC AUTO: 88.2 FL (ref 80–99.9)
MONOCYTES ABSOLUTE: 0.57 E9/L (ref 0.1–0.95)
MONOCYTES RELATIVE PERCENT: 9.9 % (ref 2–12)
NEUTROPHILS ABSOLUTE: 3.73 E9/L (ref 1.8–7.3)
NEUTROPHILS RELATIVE PERCENT: 65 % (ref 43–80)
PDW BLD-RTO: 13 FL (ref 11.5–15)
PLATELET # BLD: 262 E9/L (ref 130–450)
PMV BLD AUTO: 10.6 FL (ref 7–12)
POTASSIUM SERPL-SCNC: 4 MMOL/L (ref 3.5–5)
RBC # BLD: 4.31 E12/L (ref 3.5–5.5)
SODIUM BLD-SCNC: 138 MMOL/L (ref 132–146)
WBC # BLD: 5.7 E9/L (ref 4.5–11.5)

## 2022-04-09 PROCEDURE — 6370000000 HC RX 637 (ALT 250 FOR IP)

## 2022-04-09 PROCEDURE — 80048 BASIC METABOLIC PNL TOTAL CA: CPT

## 2022-04-09 PROCEDURE — 1200000000 HC SEMI PRIVATE

## 2022-04-09 PROCEDURE — 6360000002 HC RX W HCPCS

## 2022-04-09 PROCEDURE — 85025 COMPLETE CBC W/AUTO DIFF WBC: CPT

## 2022-04-09 PROCEDURE — 6360000002 HC RX W HCPCS: Performed by: SPECIALIST

## 2022-04-09 PROCEDURE — 2580000003 HC RX 258: Performed by: SPECIALIST

## 2022-04-09 PROCEDURE — 36415 COLL VENOUS BLD VENIPUNCTURE: CPT

## 2022-04-09 PROCEDURE — 2580000003 HC RX 258

## 2022-04-09 RX ADMIN — PIPERACILLIN SODIUM AND TAZOBACTAM SODIUM 4500 MG: 4; .5 INJECTION, POWDER, LYOPHILIZED, FOR SOLUTION INTRAVENOUS at 06:05

## 2022-04-09 RX ADMIN — ENOXAPARIN SODIUM 40 MG: 40 INJECTION SUBCUTANEOUS at 08:53

## 2022-04-09 RX ADMIN — LEVOTHYROXINE SODIUM 50 MCG: 50 TABLET ORAL at 06:06

## 2022-04-09 RX ADMIN — SODIUM CHLORIDE, PRESERVATIVE FREE 10 ML: 5 INJECTION INTRAVENOUS at 22:57

## 2022-04-09 RX ADMIN — VENLAFAXINE HYDROCHLORIDE 37.5 MG: 37.5 CAPSULE, EXTENDED RELEASE ORAL at 08:53

## 2022-04-09 RX ADMIN — ACETAMINOPHEN 650 MG: 325 TABLET ORAL at 10:26

## 2022-04-09 RX ADMIN — SODIUM CHLORIDE, PRESERVATIVE FREE 10 ML: 5 INJECTION INTRAVENOUS at 15:48

## 2022-04-09 RX ADMIN — FLUCONAZOLE 400 MG: 2 INJECTION, SOLUTION INTRAVENOUS at 13:36

## 2022-04-09 RX ADMIN — SODIUM CHLORIDE, PRESERVATIVE FREE 10 ML: 5 INJECTION INTRAVENOUS at 08:53

## 2022-04-09 RX ADMIN — PIPERACILLIN SODIUM AND TAZOBACTAM SODIUM 4500 MG: 4; .5 INJECTION, POWDER, LYOPHILIZED, FOR SOLUTION INTRAVENOUS at 16:46

## 2022-04-09 ASSESSMENT — PAIN SCALES - GENERAL
PAINLEVEL_OUTOF10: 0
PAINLEVEL_OUTOF10: 0
PAINLEVEL_OUTOF10: 5

## 2022-04-09 ASSESSMENT — PAIN - FUNCTIONAL ASSESSMENT: PAIN_FUNCTIONAL_ASSESSMENT: ACTIVITIES ARE NOT PREVENTED

## 2022-04-09 ASSESSMENT — PAIN DESCRIPTION - LOCATION: LOCATION: ABDOMEN

## 2022-04-09 ASSESSMENT — PAIN DESCRIPTION - DESCRIPTORS: DESCRIPTORS: ACHING

## 2022-04-09 ASSESSMENT — PAIN DESCRIPTION - ONSET: ONSET: ON-GOING

## 2022-04-09 ASSESSMENT — PAIN DESCRIPTION - FREQUENCY: FREQUENCY: INTERMITTENT

## 2022-04-09 ASSESSMENT — PAIN DESCRIPTION - PAIN TYPE: TYPE: ACUTE PAIN

## 2022-04-09 ASSESSMENT — PAIN DESCRIPTION - PROGRESSION: CLINICAL_PROGRESSION: GRADUALLY IMPROVING

## 2022-04-09 ASSESSMENT — PAIN DESCRIPTION - ORIENTATION: ORIENTATION: RIGHT;LEFT;LOWER

## 2022-04-09 NOTE — PROGRESS NOTES
[address] [place] [address] [facility]  Progress Note    SUBJECTIVE:  No chief complaint on file. Patient is tolerating medications. No reported adverse drug reactions. No nausea, vomiting, diarrhea.  left lower abdominal quadrant. She brought to my attention that her right forearm IV appeared to be infiltrating was a little bit firm. I did pause the IV fluids. Review of systems:  As stated above in the chief complaint, otherwise negative. Medications:  Scheduled Meds:   enoxaparin  40 mg SubCUTAneous Daily    levothyroxine  50 mcg Oral Daily    venlafaxine  37.5 mg Oral Daily with breakfast    piperacillin-tazobactam  4,500 mg IntraVENous Q8H    fluconazole  400 mg IntraVENous Q24H    sodium chloride flush  5-40 mL IntraVENous 2 times per day     Continuous Infusions:   sodium chloride       PRN Meds:HYDROmorphone, sodium chloride flush, sodium chloride, ondansetron **OR** ondansetron, acetaminophen, oxyCODONE, HYDROmorphone    OBJECTIVE:  /75   Pulse 77   Temp 99.1 °F (37.3 °C)   Resp 16   Ht 5' 5\" (1.651 m)   Wt 151 lb (68.5 kg)   LMP 02/10/2011   SpO2 98%   BMI 25.13 kg/m²   Temp  Av.1 °F (37.3 °C)  Min: 99.1 °F (37.3 °C)  Max: 99.1 °F (37.3 °C)  Constitutional: The patient is awake, alert, and oriented. Skin: Warm and dry. No rashes were noted. HEENT: Round and reactive pupils. Moist mucous membranes. No ulcerations or thrush. Neck: Supple to movements. Chest: No use of accessory muscles to breathe. Symmetrical expansion. No wheezing, crackles or rhonchi. Cardiovascular: S1 and S2 are rhythmic and regular. No murmurs appreciated. Abdomen: Positive bowel sounds to auscultation. Left lower quad tender to palpation. No masses felt. No hepatosplenomegaly. Extremities: No clubbing, no cyanosis, no edema.   Lines: peripheral - right x2 -infiltrating    Laboratory and Tests Review:  Lab Results   Component Value Date    WBC 5.7 2022 WBC 7.7 04/08/2022    WBC 10.5 03/17/2022    HGB 12.3 04/09/2022    HCT 38.0 04/09/2022    MCV 88.2 04/09/2022     04/09/2022     Lab Results   Component Value Date    NEUTROABS 3.73 04/09/2022    NEUTROABS 5.00 04/08/2022    NEUTROABS 8.59 (H) 03/17/2022     No results found for: CRPHS  Lab Results   Component Value Date    ALT 20 03/17/2022    AST 17 03/17/2022    ALKPHOS 79 03/17/2022    BILITOT 0.5 03/17/2022     Lab Results   Component Value Date     04/09/2022    K 4.0 04/09/2022    K 3.9 04/07/2022     04/09/2022    CO2 26 04/09/2022    BUN 9 04/09/2022    CREATININE 1.2 04/09/2022    CREATININE 1.0 04/07/2022    CREATININE 1.0 03/17/2022    GFRAA 56 04/09/2022    LABGLOM 46 04/09/2022    GLUCOSE 107 04/09/2022    GLUCOSE 79 12/22/2010    PROT 7.7 03/17/2022    LABALBU 4.5 03/17/2022    LABALBU 4.3 12/22/2010    CALCIUM 9.0 04/09/2022    BILITOT 0.5 03/17/2022    ALKPHOS 79 03/17/2022    AST 17 03/17/2022    ALT 20 03/17/2022     No results found for: CRP  No results found for: [address]  Radiology:  Ct abd 3/17  Impression   1. Moderate edema along the rectosigmoid colon likely indicating ACUTE   DIVERTICULITIS.  Episodes of acute diverticulitis or also observed in this   region on the previous CTs from 12/10/2020 and 03/21/2020.   2. No free air or abscess.    3. Small hiatal hernia.       RECOMMENDATIONS:   Unavailable       Ct abd 4/7  Impression   Extensive thickening of the sigmoid and rectosigmoid colon appearing similar   to slightly worsened from previous and again thought to represent acute   diverticulitis.  Infiltrating neoplastic process less likely.       Worsening fluid in the pelvis with findings concerning for phlegmon and   possible developing abscesses.  A particular somewhat localized fluid   collection measures up to 3 cm and does not contain gas but is concerning for   developing abscess.  Continued follow-up recommended.       Moderate to large stool burden.     RECOMMENDATIONS:   Unavailable       Microbiology:     Diverticulitis with phlegmon  Follow-up for abscess formation  Fevers     Plan:    Cont Zosyn 4.5 g IV every 8 extended infusion, day 2  Diflucan 400 IV daily  Check cultures  Review CT abd from 4/7 and 3/17  Monitor labs  Gen sx input   Will follow with you    With concern for developing phlegmon cont IV - discuss picc and repeat CT    GABY Trammell - CNS  8:29 AM  4/9/2022. Patient seen and examined. I had a face to face encounter with the patient. Agree with exam.  Assessment and plan as outlined above and directed by me. Addition and corrections were done as deemed appropriate. My exam and plan include: Patient is tolerating antibiotics. She is tolerating fevers. Continue Zosyn and Fluconazole. Check final culture.     Christ Laurent MD  4/9/2022  3:23 PM

## 2022-04-09 NOTE — PROGRESS NOTES
Progress note:    Overall, the patient feels well. The patient states that her pain is improved. Afebrile    Laboratory studies:    WBCs 5.7 with a slight left shift    Physical examination:    The abdomen is soft, nondistended with mild to moderate suprapubic tenderness without guarding or rebound. Assessment:    Moderate inflammatory changes related to acute diverticulitis.     Plan:    Continue Zosyn as per infectious disease  Continue low residue diet  Possible discharge today    [place] Jhonatan MILAN

## 2022-04-10 LAB
ANION GAP SERPL CALCULATED.3IONS-SCNC: 24 MMOL/L (ref 7–16)
BASOPHILS ABSOLUTE: 0.03 E9/L (ref 0–0.2)
BASOPHILS RELATIVE PERCENT: 0.5 % (ref 0–2)
BUN BLDV-MCNC: 9 MG/DL (ref 6–20)
CALCIUM SERPL-MCNC: 8.2 MG/DL (ref 8.6–10.2)
CHLORIDE BLD-SCNC: 93 MMOL/L (ref 98–107)
CO2: 22 MMOL/L (ref 22–29)
CREAT SERPL-MCNC: 0.9 MG/DL (ref 0.5–1)
EOSINOPHILS ABSOLUTE: 0.12 E9/L (ref 0.05–0.5)
EOSINOPHILS RELATIVE PERCENT: 2.2 % (ref 0–6)
GFR AFRICAN AMERICAN: >60
GFR NON-AFRICAN AMERICAN: >60 ML/MIN/1.73
GLUCOSE BLD-MCNC: 366 MG/DL (ref 74–99)
HCT VFR BLD CALC: 35.2 % (ref 34–48)
HEMOGLOBIN: 11.5 G/DL (ref 11.5–15.5)
IMMATURE GRANULOCYTES #: 0.02 E9/L
IMMATURE GRANULOCYTES %: 0.4 % (ref 0–5)
LYMPHOCYTES ABSOLUTE: 1.48 E9/L (ref 1.5–4)
LYMPHOCYTES RELATIVE PERCENT: 26.6 % (ref 20–42)
MCH RBC QN AUTO: 28.5 PG (ref 26–35)
MCHC RBC AUTO-ENTMCNC: 32.7 % (ref 32–34.5)
MCV RBC AUTO: 87.3 FL (ref 80–99.9)
MONOCYTES ABSOLUTE: 0.47 E9/L (ref 0.1–0.95)
MONOCYTES RELATIVE PERCENT: 8.4 % (ref 2–12)
NEUTROPHILS ABSOLUTE: 3.45 E9/L (ref 1.8–7.3)
NEUTROPHILS RELATIVE PERCENT: 61.9 % (ref 43–80)
PDW BLD-RTO: 13 FL (ref 11.5–15)
PLATELET # BLD: 253 E9/L (ref 130–450)
PMV BLD AUTO: 10.6 FL (ref 7–12)
POTASSIUM SERPL-SCNC: 3.8 MMOL/L (ref 3.5–5)
RBC # BLD: 4.03 E12/L (ref 3.5–5.5)
SODIUM BLD-SCNC: 139 MMOL/L (ref 132–146)
WBC # BLD: 5.6 E9/L (ref 4.5–11.5)

## 2022-04-10 PROCEDURE — 1200000000 HC SEMI PRIVATE

## 2022-04-10 PROCEDURE — 36415 COLL VENOUS BLD VENIPUNCTURE: CPT

## 2022-04-10 PROCEDURE — 85025 COMPLETE CBC W/AUTO DIFF WBC: CPT

## 2022-04-10 PROCEDURE — 80048 BASIC METABOLIC PNL TOTAL CA: CPT

## 2022-04-10 PROCEDURE — 2580000003 HC RX 258: Performed by: SPECIALIST

## 2022-04-10 PROCEDURE — 6360000002 HC RX W HCPCS

## 2022-04-10 PROCEDURE — 6370000000 HC RX 637 (ALT 250 FOR IP)

## 2022-04-10 PROCEDURE — 6360000002 HC RX W HCPCS: Performed by: SPECIALIST

## 2022-04-10 RX ADMIN — ENOXAPARIN SODIUM 40 MG: 40 INJECTION SUBCUTANEOUS at 08:09

## 2022-04-10 RX ADMIN — HYDROMORPHONE HYDROCHLORIDE 0.5 MG: 1 INJECTION, SOLUTION INTRAMUSCULAR; INTRAVENOUS; SUBCUTANEOUS at 13:06

## 2022-04-10 RX ADMIN — PIPERACILLIN AND TAZOBACTAM 4500 MG: 4; .5 INJECTION, POWDER, LYOPHILIZED, FOR SOLUTION INTRAVENOUS at 01:19

## 2022-04-10 RX ADMIN — LEVOTHYROXINE SODIUM 50 MCG: 50 TABLET ORAL at 05:54

## 2022-04-10 RX ADMIN — FLUCONAZOLE 400 MG: 2 INJECTION, SOLUTION INTRAVENOUS at 10:21

## 2022-04-10 RX ADMIN — ONDANSETRON 4 MG: 2 INJECTION INTRAMUSCULAR; INTRAVENOUS at 13:06

## 2022-04-10 RX ADMIN — PIPERACILLIN AND TAZOBACTAM 4500 MG: 4; .5 INJECTION, POWDER, LYOPHILIZED, FOR SOLUTION INTRAVENOUS at 10:20

## 2022-04-10 RX ADMIN — PIPERACILLIN AND TAZOBACTAM 4500 MG: 4; .5 INJECTION, POWDER, LYOPHILIZED, FOR SOLUTION INTRAVENOUS at 18:59

## 2022-04-10 RX ADMIN — VENLAFAXINE HYDROCHLORIDE 37.5 MG: 37.5 CAPSULE, EXTENDED RELEASE ORAL at 08:09

## 2022-04-10 ASSESSMENT — PAIN DESCRIPTION - PROGRESSION: CLINICAL_PROGRESSION: GRADUALLY WORSENING

## 2022-04-10 ASSESSMENT — PAIN DESCRIPTION - FREQUENCY: FREQUENCY: INTERMITTENT

## 2022-04-10 ASSESSMENT — PAIN DESCRIPTION - LOCATION: LOCATION: ABDOMEN

## 2022-04-10 ASSESSMENT — PAIN - FUNCTIONAL ASSESSMENT: PAIN_FUNCTIONAL_ASSESSMENT: PREVENTS OR INTERFERES SOME ACTIVE ACTIVITIES AND ADLS

## 2022-04-10 ASSESSMENT — PAIN SCALES - GENERAL
PAINLEVEL_OUTOF10: 0
PAINLEVEL_OUTOF10: 0
PAINLEVEL_OUTOF10: 7

## 2022-04-10 ASSESSMENT — PAIN DESCRIPTION - DESCRIPTORS: DESCRIPTORS: ACHING

## 2022-04-10 ASSESSMENT — PAIN DESCRIPTION - ORIENTATION: ORIENTATION: RIGHT;LEFT

## 2022-04-10 ASSESSMENT — PAIN DESCRIPTION - PAIN TYPE: TYPE: ACUTE PAIN

## 2022-04-10 ASSESSMENT — PAIN DESCRIPTION - ONSET: ONSET: ON-GOING

## 2022-04-10 NOTE — PROGRESS NOTES
Diverticulitis  Still with some LLQ tenderness  Tolerating low res diet  ID following  Dc planning    Elkin Lares MD

## 2022-04-10 NOTE — PLAN OF CARE
Problem: Pain:  Goal: Pain level will decrease  Description: Pain level will decrease  Outcome: Met This Shift  Goal: Control of acute pain  Description: Control of acute pain  Outcome: Met This Shift  Goal: Control of chronic pain  Description: Control of chronic pain  Outcome: Met This Shift     Problem: SAFETY  Goal: Free from accidental physical injury  Outcome: Met This Shift  Goal: Free from intentional harm  Outcome: Met This Shift     Problem: DAILY CARE  Goal: Daily care needs are met  Outcome: Met This Shift     Problem: PAIN  Goal: Patient's pain/discomfort is manageable  Outcome: Met This Shift     Problem: SKIN INTEGRITY  Goal: Skin integrity is maintained or improved  Outcome: Met This Shift     Problem: KNOWLEDGE DEFICIT  Goal: Patient/S.O. demonstrates understanding of disease process, treatment plan, medications, and discharge instructions.   Outcome: Met This Shift     Problem: DISCHARGE BARRIERS  Goal: Patient's continuum of care needs are met  Outcome: Met This Shift

## 2022-04-10 NOTE — PLAN OF CARE
Problem: Pain:  Goal: Pain level will decrease  Description: Pain level will decrease  4/10/2022 0956 by Yusra Trevino RN  Outcome: Met This Shift  4/10/2022 0138 by Venus Velasco RN  Outcome: Met This Shift  Goal: Control of acute pain  Description: Control of acute pain  4/10/2022 0956 by Yusra Trevino RN  Outcome: Met This Shift  4/10/2022 0138 by Venus Velasco RN  Outcome: Met This Shift  Goal: Control of chronic pain  Description: Control of chronic pain  4/10/2022 0956 by Yusra Trevino RN  Outcome: Met This Shift  4/10/2022 0138 by Venus Velasco RN  Outcome: Met This Shift     Problem: SAFETY  Goal: Free from accidental physical injury  4/10/2022 0956 by Yusra Trevino RN  Outcome: Met This Shift  4/10/2022 0138 by Venus Velasco RN  Outcome: Met This Shift  Goal: Free from intentional harm  4/10/2022 0956 by Yusra Trevino RN  Outcome: Met This Shift  4/10/2022 0138 by Venus Velasco RN  Outcome: Met This Shift     Problem: DAILY CARE  Goal: Daily care needs are met  4/10/2022 0956 by Yusra Trevino RN  Outcome: Met This Shift  4/10/2022 0138 by Venus Velasco RN  Outcome: Met This Shift     Problem: PAIN  Goal: Patient's pain/discomfort is manageable  4/10/2022 0956 by Yusra Trevino RN  Outcome: Met This Shift  4/10/2022 0138 by Venus Velasco RN  Outcome: Met This Shift     Problem: SKIN INTEGRITY  Goal: Skin integrity is maintained or improved  4/10/2022 0956 by Yusra Trevino RN  Outcome: Met This Shift  4/10/2022 0138 by Venus Velasco RN  Outcome: Met This Shift     Problem: KNOWLEDGE DEFICIT  Goal: Patient/S.O. demonstrates understanding of disease process, treatment plan, medications, and discharge instructions.   4/10/2022 0956 by Yusra Trevino RN  Outcome: Ongoing  4/10/2022 0138 by Venus Velasco RN  Outcome: Met This Shift     Problem: DISCHARGE BARRIERS  Goal: Patient's continuum of care needs are met  4/10/2022 0956 by Yusra Trevino RN  Outcome: Ongoing  4/10/2022 0138 by Venus Velasco RN  Outcome: Met This Shift

## 2022-04-10 NOTE — PLAN OF CARE
Problem: Pain:  Goal: Pain level will decrease  Description: Pain level will decrease  4/10/2022 1011 by Sanaz Gracia RN  Outcome: Met This Shift  4/10/2022 0956 by Sanaz Gracia RN  Outcome: Met This Shift  4/10/2022 0138 by Bety Hager RN  Outcome: Met This Shift  Goal: Control of acute pain  Description: Control of acute pain  4/10/2022 1011 by Sanaz Gracia RN  Outcome: Met This Shift  4/10/2022 0956 by Sanaz Gracia RN  Outcome: Met This Shift  4/10/2022 0138 by Bety Hager RN  Outcome: Met This Shift  Goal: Control of chronic pain  Description: Control of chronic pain  4/10/2022 1011 by Sanaz Gracia RN  Outcome: Met This Shift  4/10/2022 0956 by Sanaz Gracia RN  Outcome: Met This Shift  4/10/2022 0138 by Bety Hager RN  Outcome: Met This Shift     Problem: SAFETY  Goal: Free from accidental physical injury  4/10/2022 1011 by Sanaz Gracia RN  Outcome: Met This Shift  4/10/2022 0956 by Sanaz Gracia RN  Outcome: Met This Shift  4/10/2022 0138 by Bety Hager RN  Outcome: Met This Shift  Goal: Free from intentional harm  4/10/2022 1011 by Sanaz Gracia RN  Outcome: Met This Shift  4/10/2022 0956 by Sanaz Gracia RN  Outcome: Met This Shift  4/10/2022 0138 by Bety Hager RN  Outcome: Met This Shift     Problem: DAILY CARE  Goal: Daily care needs are met  4/10/2022 1011 by Sanaz Gracia RN  Outcome: Met This Shift  4/10/2022 0956 by Sanaz Gracia RN  Outcome: Met This Shift  4/10/2022 0138 by Bety Hager RN  Outcome: Met This Shift     Problem: PAIN  Goal: Patient's pain/discomfort is manageable  4/10/2022 1011 by Sanaz Gracia RN  Outcome: Met This Shift  4/10/2022 0956 by Sanaz Gracia RN  Outcome: Met This Shift  4/10/2022 0138 by Bety Hager RN  Outcome: Met This Shift     Problem: SKIN INTEGRITY  Goal: Skin integrity is maintained or improved  4/10/2022 1011 by Sanaz Gracia, RN  Outcome: Met This Shift  4/10/2022 0956 by Sanaz Gracia RN  Outcome: Met This Shift  4/10/2022 0138 by Manuel Lee RN  Outcome: Met This Shift     Problem: KNOWLEDGE DEFICIT  Goal: Patient/S.O. demonstrates understanding of disease process, treatment plan, medications, and discharge instructions.   4/10/2022 1011 by Ethelyn Klinefelter, RN  Outcome: Ongoing  4/10/2022 0956 by Ethelyn Klinefelter, RN  Outcome: Ongoing  4/10/2022 0138 by Manuel Lee RN  Outcome: Met This Shift     Problem: DISCHARGE BARRIERS  Goal: Patient's continuum of care needs are met  4/10/2022 1011 by Ethelyn Klinefelter, RN  Outcome: Ongoing  4/10/2022 0956 by Ethelyn Klinefelter, RN  Outcome: Ongoing  4/10/2022 0138 by Manuel Lee RN  Outcome: Met This Shift

## 2022-04-10 NOTE — PROGRESS NOTES
[address] [place] [address] [facility]  [facility]  Progress Note    SUBJECTIVE:  No chief complaint on file. Patient is tolerating medications. No reported adverse drug reactions. No nausea, vomiting, diarrhea.  left lower abdominal quadrant.  eating well  Review of systems:  As stated above in the chief complaint, otherwise negative. Medications:  Scheduled Meds:   piperacillin-tazobactam  4,500 mg IntraVENous Q8H    enoxaparin  40 mg SubCUTAneous Daily    levothyroxine  50 mcg Oral Daily    venlafaxine  37.5 mg Oral Daily with breakfast    fluconazole  400 mg IntraVENous Q24H    sodium chloride flush  5-40 mL IntraVENous 2 times per day     Continuous Infusions:   sodium chloride       PRN Meds:HYDROmorphone, sodium chloride flush, sodium chloride, ondansetron **OR** ondansetron, acetaminophen, oxyCODONE, HYDROmorphone    OBJECTIVE:  /83   Pulse 78   Temp 98.6 °F (37 °C) (Oral)   Resp 16   Ht 5' 5\" (1.651 m)   Wt 151 lb (68.5 kg)   LMP 02/10/2011   SpO2 97%   BMI 25.13 kg/m²   Temp  Av.4 °F (36.9 °C)  Min: 98.2 °F (36.8 °C)  Max: 98.6 °F (37 °C)  Constitutional: The patient is awake, alert, and oriented. Skin: Warm and dry. No rashes were noted. HEENT: Round and reactive pupils. Moist mucous membranes. No ulcerations or thrush. Neck: Supple to movements. Chest: No use of accessory muscles to breathe. Symmetrical expansion. No wheezing, crackles or rhonchi. Cardiovascular: S1 and S2 are rhythmic and regular. No murmurs appreciated. Abdomen: Positive bowel sounds to auscultation. Left lower quad tender to palpation. No masses felt. No hepatosplenomegaly. Extremities: No clubbing, no cyanosis, no edema.   Lines: peripheral - right x2 -infiltrating    Laboratory and Tests Review:  Lab Results   Component Value Date    WBC 5.6 04/10/2022    WBC 5.7 2022    WBC 7.7 2022    HGB 11.5 04/10/2022    HCT 35.2 04/10/2022    MCV 87.3 04/10/2022    PLT 253 04/10/2022     Lab Results   Component Value Date    NEUTROABS 3.45 04/10/2022    NEUTROABS 3.73 04/09/2022    NEUTROABS 5.00 04/08/2022     No results found for: CRPHS  Lab Results   Component Value Date    ALT 20 03/17/2022    AST 17 03/17/2022    ALKPHOS 79 03/17/2022    BILITOT 0.5 03/17/2022     Lab Results   Component Value Date     04/10/2022    K 3.8 04/10/2022    K 3.9 04/07/2022    CL 93 04/10/2022    CO2 22 04/10/2022    BUN 9 04/10/2022    CREATININE 0.9 04/10/2022    CREATININE 1.2 04/09/2022    CREATININE 1.0 04/07/2022    GFRAA >60 04/10/2022    LABGLOM >60 04/10/2022    GLUCOSE 366 04/10/2022    GLUCOSE 79 12/22/2010    PROT 7.7 03/17/2022    LABALBU 4.5 03/17/2022    LABALBU 4.3 12/22/2010    CALCIUM 8.2 04/10/2022    BILITOT 0.5 03/17/2022    ALKPHOS 79 03/17/2022    AST 17 03/17/2022    ALT 20 03/17/2022     No results found for: CRP  No results found for: Mari Chowdary  Radiology:  Ct abd 3/17  Impression   1. Moderate edema along the rectosigmoid colon likely indicating ACUTE   DIVERTICULITIS.  Episodes of acute diverticulitis or also observed in this   region on the previous CTs from 12/10/2020 and 03/21/2020.   2. No free air or abscess.    3. Small hiatal hernia.       RECOMMENDATIONS:   Unavailable       Ct abd 4/7  Impression   Extensive thickening of the sigmoid and rectosigmoid colon appearing similar   to slightly worsened from previous and again thought to represent acute   diverticulitis.  Infiltrating neoplastic process less likely.       Worsening fluid in the pelvis with findings concerning for phlegmon and   possible developing abscesses.  A particular somewhat localized fluid   collection measures up to 3 cm and does not contain gas but is concerning for   developing abscess.  Continued follow-up recommended.       Moderate to large stool burden.       RECOMMENDATIONS:   Unavailable       Microbiology:     Diverticulitis with phlegmon  Follow-up for abscess formation  Fevers resolving    Plan:    Cont Zosyn 4.5 g IV every 8 extended infusion, day 3 (Prior to admission she was on cefdinir and Flagyl)  Diflucan 400 IV daily  Check cultures  Review CT abd from 4/7 and 3/17  Monitor labs  Gen sx input   Will follow with you    With concern for developing phlegmon cont IV - discuss picc and repeat CT    GABY Brunson - CNS  8:28 AM  4/10/2022. Patient seen and examined. I had a face to face encounter with the patient. Agree with exam.  Assessment and plan as outlined above and directed by me. Addition and corrections were done as deemed appropriate. My exam and plan include: The patient was feeling good. She ate some peaches and mandrin. Had some abdominal pain. Otherwise, tolerating antibiotics. Continue current Treatment.     Krishan Macedo MD  4/10/2022  1:51 PM

## 2022-04-11 ENCOUNTER — APPOINTMENT (OUTPATIENT)
Dept: CT IMAGING | Age: 57
DRG: 391 | End: 2022-04-11
Attending: SURGERY
Payer: COMMERCIAL

## 2022-04-11 LAB
ANION GAP SERPL CALCULATED.3IONS-SCNC: 10 MMOL/L (ref 7–16)
BASOPHILS ABSOLUTE: 0.04 E9/L (ref 0–0.2)
BASOPHILS RELATIVE PERCENT: 0.6 % (ref 0–2)
BUN BLDV-MCNC: 11 MG/DL (ref 6–20)
CALCIUM SERPL-MCNC: 9 MG/DL (ref 8.6–10.2)
CHLORIDE BLD-SCNC: 105 MMOL/L (ref 98–107)
CO2: 24 MMOL/L (ref 22–29)
CREAT SERPL-MCNC: 1 MG/DL (ref 0.5–1)
EOSINOPHILS ABSOLUTE: 0.09 E9/L (ref 0.05–0.5)
EOSINOPHILS RELATIVE PERCENT: 1.3 % (ref 0–6)
GFR AFRICAN AMERICAN: >60
GFR NON-AFRICAN AMERICAN: 57 ML/MIN/1.73
GLUCOSE BLD-MCNC: 93 MG/DL (ref 74–99)
HCT VFR BLD CALC: 40.5 % (ref 34–48)
HEMOGLOBIN: 13.3 G/DL (ref 11.5–15.5)
IMMATURE GRANULOCYTES #: 0.04 E9/L
IMMATURE GRANULOCYTES %: 0.6 % (ref 0–5)
LYMPHOCYTES ABSOLUTE: 1.13 E9/L (ref 1.5–4)
LYMPHOCYTES RELATIVE PERCENT: 16.4 % (ref 20–42)
MCH RBC QN AUTO: 28.5 PG (ref 26–35)
MCHC RBC AUTO-ENTMCNC: 32.8 % (ref 32–34.5)
MCV RBC AUTO: 86.7 FL (ref 80–99.9)
MONOCYTES ABSOLUTE: 0.51 E9/L (ref 0.1–0.95)
MONOCYTES RELATIVE PERCENT: 7.4 % (ref 2–12)
NEUTROPHILS ABSOLUTE: 5.09 E9/L (ref 1.8–7.3)
NEUTROPHILS RELATIVE PERCENT: 73.7 % (ref 43–80)
PDW BLD-RTO: 13.2 FL (ref 11.5–15)
PLATELET # BLD: 272 E9/L (ref 130–450)
PMV BLD AUTO: 10.4 FL (ref 7–12)
POTASSIUM SERPL-SCNC: 4.2 MMOL/L (ref 3.5–5)
RBC # BLD: 4.67 E12/L (ref 3.5–5.5)
SODIUM BLD-SCNC: 139 MMOL/L (ref 132–146)
WBC # BLD: 6.9 E9/L (ref 4.5–11.5)

## 2022-04-11 PROCEDURE — 74177 CT ABD & PELVIS W/CONTRAST: CPT

## 2022-04-11 PROCEDURE — 6370000000 HC RX 637 (ALT 250 FOR IP)

## 2022-04-11 PROCEDURE — 2580000003 HC RX 258: Performed by: SPECIALIST

## 2022-04-11 PROCEDURE — 6360000004 HC RX CONTRAST MEDICATION: Performed by: RADIOLOGY

## 2022-04-11 PROCEDURE — 36415 COLL VENOUS BLD VENIPUNCTURE: CPT

## 2022-04-11 PROCEDURE — 80048 BASIC METABOLIC PNL TOTAL CA: CPT

## 2022-04-11 PROCEDURE — 2580000003 HC RX 258

## 2022-04-11 PROCEDURE — 85025 COMPLETE CBC W/AUTO DIFF WBC: CPT

## 2022-04-11 PROCEDURE — 6360000002 HC RX W HCPCS: Performed by: SPECIALIST

## 2022-04-11 PROCEDURE — 6360000002 HC RX W HCPCS

## 2022-04-11 PROCEDURE — 1200000000 HC SEMI PRIVATE

## 2022-04-11 RX ORDER — SODIUM CHLORIDE 0.9 % (FLUSH) 0.9 %
5-40 SYRINGE (ML) INJECTION EVERY 12 HOURS SCHEDULED
Status: DISCONTINUED | OUTPATIENT
Start: 2022-04-11 | End: 2022-04-14 | Stop reason: HOSPADM

## 2022-04-11 RX ORDER — SODIUM CHLORIDE 0.9 % (FLUSH) 0.9 %
5-40 SYRINGE (ML) INJECTION PRN
Status: DISCONTINUED | OUTPATIENT
Start: 2022-04-11 | End: 2022-04-14 | Stop reason: HOSPADM

## 2022-04-11 RX ORDER — LIDOCAINE HYDROCHLORIDE 10 MG/ML
5 INJECTION, SOLUTION EPIDURAL; INFILTRATION; INTRACAUDAL; PERINEURAL ONCE
Status: COMPLETED | OUTPATIENT
Start: 2022-04-11 | End: 2022-04-12

## 2022-04-11 RX ORDER — SODIUM CHLORIDE 9 MG/ML
25 INJECTION, SOLUTION INTRAVENOUS PRN
Status: DISCONTINUED | OUTPATIENT
Start: 2022-04-11 | End: 2022-04-14 | Stop reason: HOSPADM

## 2022-04-11 RX ADMIN — VENLAFAXINE HYDROCHLORIDE 37.5 MG: 37.5 CAPSULE, EXTENDED RELEASE ORAL at 06:41

## 2022-04-11 RX ADMIN — LEVOTHYROXINE SODIUM 50 MCG: 50 TABLET ORAL at 06:41

## 2022-04-11 RX ADMIN — FLUCONAZOLE 400 MG: 2 INJECTION, SOLUTION INTRAVENOUS at 12:07

## 2022-04-11 RX ADMIN — ENOXAPARIN SODIUM 40 MG: 40 INJECTION SUBCUTANEOUS at 08:49

## 2022-04-11 RX ADMIN — PIPERACILLIN AND TAZOBACTAM 4500 MG: 4; .5 INJECTION, POWDER, LYOPHILIZED, FOR SOLUTION INTRAVENOUS at 01:08

## 2022-04-11 RX ADMIN — SODIUM CHLORIDE, PRESERVATIVE FREE 10 ML: 5 INJECTION INTRAVENOUS at 08:49

## 2022-04-11 RX ADMIN — IOPAMIDOL 75 ML: 755 INJECTION, SOLUTION INTRAVENOUS at 17:23

## 2022-04-11 RX ADMIN — PIPERACILLIN AND TAZOBACTAM 4500 MG: 4; .5 INJECTION, POWDER, LYOPHILIZED, FOR SOLUTION INTRAVENOUS at 18:18

## 2022-04-11 RX ADMIN — PIPERACILLIN AND TAZOBACTAM 4500 MG: 4; .5 INJECTION, POWDER, LYOPHILIZED, FOR SOLUTION INTRAVENOUS at 08:49

## 2022-04-11 ASSESSMENT — PAIN SCALES - GENERAL
PAINLEVEL_OUTOF10: 0
PAINLEVEL_OUTOF10: 0

## 2022-04-11 NOTE — PROGRESS NOTES
Surgical resident updated regarding mod cramping/intermittent nausea after breakfast tray this AM. Pt admits that she is not eating much off of trays anyways. Awaiting response.

## 2022-04-11 NOTE — PLAN OF CARE
Problem: Pain:  Goal: Pain level will decrease  Description: Pain level will decrease  4/11/2022 1551 by Amada Mohamud RN  Outcome: Met This Shift    Goal: Control of acute pain  Description: Control of acute pain  4/11/2022 1551 by Amada Mohamud RN  Outcome: Met This Shift    Problem: SAFETY  Goal: Free from accidental physical injury  4/11/2022 1551 by Amada Mohamud RN  Outcome: Met This Shift    Goal: Free from intentional harm  Outcome: Met This Shift     Problem: DAILY CARE  Goal: Daily care needs are met  4/11/2022 1551 by Amada Mohamud RN  Outcome: Met This Shift       Problem: PAIN  Goal: Patient's pain/discomfort is manageable  4/11/2022 1551 by Amada Mohamud RN  Outcome: Met This Shift    Problem: DISCHARGE BARRIERS  Goal: Patient's continuum of care needs are met  4/11/2022 1551 by Amada Mohamud RN  Outcome: Met This Shift

## 2022-04-11 NOTE — PROGRESS NOTES
Spoke to Dr. Rj Davis regarding CT abd pelvis results.  Dr. Jj Colon answering service also paged, see new request for IR in AM.

## 2022-04-11 NOTE — PROGRESS NOTES
[address] [place] [address] [facility]  [facility]  Progress Note    SUBJECTIVE:  No chief complaint on file. Patient is tolerating medications. No reported adverse drug reactions. No nausea, vomiting, diarrhea. Mid- Lower abdominal pain. Was improved at one point but now seems to be getting worse yesterday and today, similar to when she first came in. Starts first thing in morning when she gets out of bed. Crampy and bloating. She is afraid to eat and feels she should go back on liquid diet. Review of systems:  As stated above in the chief complaint, otherwise negative. Medications:  Scheduled Meds:   piperacillin-tazobactam  4,500 mg IntraVENous Q8H    enoxaparin  40 mg SubCUTAneous Daily    levothyroxine  50 mcg Oral Daily    venlafaxine  37.5 mg Oral Daily with breakfast    fluconazole  400 mg IntraVENous Q24H    sodium chloride flush  5-40 mL IntraVENous 2 times per day     Continuous Infusions:   sodium chloride       PRN Meds:sodium chloride flush, sodium chloride, ondansetron **OR** ondansetron, acetaminophen, oxyCODONE    OBJECTIVE:  BP (!) 148/82   Pulse 75   Temp 98.8 °F (37.1 °C) (Oral)   Resp 16   Ht 5' 5\" (1.651 m)   Wt 151 lb (68.5 kg)   LMP 02/10/2011   SpO2 97%   BMI 25.13 kg/m²   Temp  Av.4 °F (36.9 °C)  Min: 98 °F (36.7 °C)  Max: 98.8 °F (37.1 °C)  Constitutional: The patient is awake, alert, and oriented. Skin: Warm and dry. No rashes were noted. HEENT: Round and reactive pupils. Moist mucous membranes. No ulcerations or thrush. Neck: Supple to movements. Chest: No use of accessory muscles to breathe. Symmetrical expansion. No wheezing, crackles or rhonchi. Cardiovascular: S1 and S2 are rhythmic and regular. No murmurs appreciated. Abdomen: Positive bowel sounds to auscultation. Left lower quad tender to palpation. No masses felt. No hepatosplenomegaly.   Extremities: No clubbing,   PIV  Laboratory and Tests Review:  Lab Results   Component Value Date WBC 6.9 04/11/2022    WBC 5.6 04/10/2022    WBC 5.7 04/09/2022    HGB 13.3 04/11/2022    HCT 40.5 04/11/2022    MCV 86.7 04/11/2022     04/11/2022     Lab Results   Component Value Date    NEUTROABS 5.09 04/11/2022    NEUTROABS 3.45 04/10/2022    NEUTROABS 3.73 04/09/2022     No results found for: CRP  Lab Results   Component Value Date    ALT 20 03/17/2022    AST 17 03/17/2022    ALKPHOS 79 03/17/2022    BILITOT 0.5 03/17/2022     Lab Results   Component Value Date     04/11/2022    K 4.2 04/11/2022    K 3.9 04/07/2022     04/11/2022    CO2 24 04/11/2022    BUN 11 04/11/2022    CREATININE 1.0 04/11/2022    CREATININE 0.9 04/10/2022    CREATININE 1.2 04/09/2022    GFRAA >60 04/11/2022    LABGLOM 57 04/11/2022    GLUCOSE 93 04/11/2022    GLUCOSE 79 12/22/2010    PROT 7.7 03/17/2022    LABALBU 4.5 03/17/2022    LABALBU 4.3 12/22/2010    CALCIUM 9.0 04/11/2022    BILITOT 0.5 03/17/2022    ALKPHOS 79 03/17/2022    AST 17 03/17/2022    ALT 20 03/17/2022     No results found for: CRP  No results found for: Armin Lyman  Radiology:  Ct abd 3/17  Impression   1. Moderate edema along the rectosigmoid colon likely indicating ACUTE   DIVERTICULITIS. Episodes of acute diverticulitis or also observed in this   region on the previous CTs from 12/10/2020 and 03/21/2020.   2. No free air or abscess. 3. Small hiatal hernia. RECOMMENDATIONS:   Unavailable       Ct abd 4/7  Impression   Extensive thickening of the sigmoid and rectosigmoid colon appearing similar   to slightly worsened from previous and again thought to represent acute   diverticulitis. Infiltrating neoplastic process less likely. Worsening fluid in the pelvis with findings concerning for phlegmon and   possible developing abscesses. A particular somewhat localized fluid   collection measures up to 3 cm and does not contain gas but is concerning for   developing abscess. Continued follow-up recommended.        Moderate to large stool burden. RECOMMENDATIONS:   Unavailable       Microbiology:     Diverticulitis with phlegmon  Follow-up for abscess formation  Fevers resolving    Plan:    Cont Zosyn 4.5 g IV every 8 extended infusion, day 4 (Prior to admission she was on cefdinir and Flagyl)  Diflucan 400 IV daily  Check cultures  repeat ct   Monitor labs  Gen sx input   Will follow with you      GABY Piedra - CNP  11:16 AM  4/11/2022. Pt seen and examined. Above discussed agree with advanced practice nurse. Labs, cultures, and radiographs reviewed. Face to Face encounter occurred. Changes made as necessary.      Nell Ontiveros MD

## 2022-04-11 NOTE — PROGRESS NOTES
GENERAL SURGERY  DAILY PROGRESS NOTE  4/11/2022    CHIEF COMPLAINT:  No chief complaint on file. SUBJECTIVE:  Tolerating pain without issue, feeling much better today. Had some diarrhea yesterday and has been having regular BM's. No nausea or vomiting     OBJECTIVE:  /86   Pulse 68   Temp 98 °F (36.7 °C) (Oral)   Resp 16   Ht 5' 5\" (1.651 m)   Wt 151 lb (68.5 kg)   LMP 02/10/2011   SpO2 95%   BMI 25.13 kg/m²     GENERAL:  NAD. A&Ox3. LUNGS:  No increased work of breathing. CARDIOVASCULAR: RR  ABDOMEN:  Soft, non-distended, left lower quadrant tender. No guarding, rigidity, rebound.     ASSESSMENT/PLAN:  59 y.o. female with diverticulitis     ID recs for abx   Pain and nausea control as needed   Low fiber diet   Monitor labs   Discharge planning pending ID Koki Arevalo MD  Surgery Resident PGY-1  4/11/2022  5:28 AM

## 2022-04-11 NOTE — PROGRESS NOTES
Patient states feeling crampy with nausea coming and going after eating this AM.  She is aware to call if she needs something for pain/nausea. Will continue to monitor.

## 2022-04-11 NOTE — PLAN OF CARE
Problem: Pain:  Goal: Pain level will decrease  Description: Pain level will decrease  4/10/2022 2250 by Lauryn Perez RN  Outcome: Met This Shift  4/10/2022 1011 by Alan Jasso RN  Outcome: Met This Shift  4/10/2022 0956 by Alan aJsso RN  Outcome: Met This Shift  Goal: Control of acute pain  Description: Control of acute pain  4/10/2022 2250 by Lauryn Perez RN  Outcome: Met This Shift  4/10/2022 1011 by Alan Jasso RN  Outcome: Met This Shift  4/10/2022 0956 by Alan Jasso RN  Outcome: Met This Shift  Goal: Control of chronic pain  Description: Control of chronic pain  4/10/2022 2250 by Lauryn Perez RN  Outcome: Met This Shift  4/10/2022 1011 by Alan Jasso RN  Outcome: Met This Shift  4/10/2022 0956 by Alan Jasso RN  Outcome: Met This Shift     Problem: SAFETY  Goal: Free from accidental physical injury  4/10/2022 2250 by Lauryn Perez RN  Outcome: Met This Shift  4/10/2022 1011 by Alan Jasso RN  Outcome: Met This Shift  4/10/2022 0956 by Alan Jasso RN  Outcome: Met This Shift  Goal: Free from intentional harm  4/10/2022 2250 by Lauryn Perez RN  Outcome: Met This Shift  4/10/2022 1011 by Alan Jasso RN  Outcome: Met This Shift  4/10/2022 0956 by Alan Jasso RN  Outcome: Met This Shift     Problem: DAILY CARE  Goal: Daily care needs are met  4/10/2022 2250 by Lauryn Perez RN  Outcome: Met This Shift  4/10/2022 1011 by Alan Jasso RN  Outcome: Met This Shift  4/10/2022 0956 by Alan Jasso RN  Outcome: Met This Shift     Problem: PAIN  Goal: Patient's pain/discomfort is manageable  4/10/2022 2250 by Lauryn Perez RN  Outcome: Met This Shift  4/10/2022 1011 by Alan Jasso RN  Outcome: Met This Shift  4/10/2022 0956 by Alan Jasso RN  Outcome: Met This Shift     Problem: SKIN INTEGRITY  Goal: Skin integrity is maintained or improved  4/10/2022 2250 by Lauryn Perez RN  Outcome: Met This Shift  4/10/2022 1011 by Alan Jasso RN  Outcome: Met This Shift  4/10/2022 0956 by Blanca Mendoza, RN  Outcome: Met This Shift     Problem: KNOWLEDGE DEFICIT  Goal: Patient/S.O. demonstrates understanding of disease process, treatment plan, medications, and discharge instructions.   4/10/2022 2250 by Chris Kent RN  Outcome: Met This Shift  4/10/2022 1011 by Blanca Mendoza RN  Outcome: Ongoing  4/10/2022 0956 by Blanca Mendoza RN  Outcome: Ongoing     Problem: DISCHARGE BARRIERS  Goal: Patient's continuum of care needs are met  4/10/2022 2250 by Chris Kent RN  Outcome: Met This Shift  4/10/2022 1011 by Blanca Mendoza RN  Outcome: Ongoing  4/10/2022 0956 by Blanca Mendoza RN  Outcome: Ongoing

## 2022-04-11 NOTE — CARE COORDINATION
Updated plan of care. Pt continue iv fluids, iv antibiotics. Cramping after breakfast this am. Plan is home with possible iv antibiotics.  [address]51 Colin Guardado FAXED AND FINAL HOME CARE ORDERS Kumar Anderson

## 2022-04-12 LAB
ANION GAP SERPL CALCULATED.3IONS-SCNC: 11 MMOL/L (ref 7–16)
BASOPHILS ABSOLUTE: 0.02 E9/L (ref 0–0.2)
BASOPHILS RELATIVE PERCENT: 0.3 % (ref 0–2)
BUN BLDV-MCNC: 9 MG/DL (ref 6–20)
CALCIUM SERPL-MCNC: 9.3 MG/DL (ref 8.6–10.2)
CHLORIDE BLD-SCNC: 102 MMOL/L (ref 98–107)
CO2: 25 MMOL/L (ref 22–29)
CREAT SERPL-MCNC: 1 MG/DL (ref 0.5–1)
EOSINOPHILS ABSOLUTE: 0.08 E9/L (ref 0.05–0.5)
EOSINOPHILS RELATIVE PERCENT: 1.2 % (ref 0–6)
GFR AFRICAN AMERICAN: >60
GFR NON-AFRICAN AMERICAN: 57 ML/MIN/1.73
GLUCOSE BLD-MCNC: 94 MG/DL (ref 74–99)
HCT VFR BLD CALC: 41.3 % (ref 34–48)
HEMOGLOBIN: 13.5 G/DL (ref 11.5–15.5)
IMMATURE GRANULOCYTES #: 0.03 E9/L
IMMATURE GRANULOCYTES %: 0.4 % (ref 0–5)
INR BLD: 1.1
LYMPHOCYTES ABSOLUTE: 1.46 E9/L (ref 1.5–4)
LYMPHOCYTES RELATIVE PERCENT: 21.7 % (ref 20–42)
MCH RBC QN AUTO: 28.9 PG (ref 26–35)
MCHC RBC AUTO-ENTMCNC: 32.7 % (ref 32–34.5)
MCV RBC AUTO: 88.4 FL (ref 80–99.9)
MONOCYTES ABSOLUTE: 0.6 E9/L (ref 0.1–0.95)
MONOCYTES RELATIVE PERCENT: 8.9 % (ref 2–12)
NEUTROPHILS ABSOLUTE: 4.55 E9/L (ref 1.8–7.3)
NEUTROPHILS RELATIVE PERCENT: 67.5 % (ref 43–80)
PDW BLD-RTO: 13.2 FL (ref 11.5–15)
PLATELET # BLD: 264 E9/L (ref 130–450)
PMV BLD AUTO: 10.8 FL (ref 7–12)
POTASSIUM SERPL-SCNC: 3.8 MMOL/L (ref 3.5–5)
PROTHROMBIN TIME: 11.5 SEC (ref 9.3–12.4)
RBC # BLD: 4.67 E12/L (ref 3.5–5.5)
SODIUM BLD-SCNC: 138 MMOL/L (ref 132–146)
WBC # BLD: 6.7 E9/L (ref 4.5–11.5)

## 2022-04-12 PROCEDURE — 80048 BASIC METABOLIC PNL TOTAL CA: CPT

## 2022-04-12 PROCEDURE — C1751 CATH, INF, PER/CENT/MIDLINE: HCPCS

## 2022-04-12 PROCEDURE — 6370000000 HC RX 637 (ALT 250 FOR IP)

## 2022-04-12 PROCEDURE — 36415 COLL VENOUS BLD VENIPUNCTURE: CPT

## 2022-04-12 PROCEDURE — 2580000003 HC RX 258: Performed by: SPECIALIST

## 2022-04-12 PROCEDURE — 85610 PROTHROMBIN TIME: CPT

## 2022-04-12 PROCEDURE — 76937 US GUIDE VASCULAR ACCESS: CPT

## 2022-04-12 PROCEDURE — 1200000000 HC SEMI PRIVATE

## 2022-04-12 PROCEDURE — 85025 COMPLETE CBC W/AUTO DIFF WBC: CPT

## 2022-04-12 PROCEDURE — 6360000002 HC RX W HCPCS: Performed by: SPECIALIST

## 2022-04-12 PROCEDURE — 36569 INSJ PICC 5 YR+ W/O IMAGING: CPT

## 2022-04-12 PROCEDURE — 2500000003 HC RX 250 WO HCPCS: Performed by: SPECIALIST

## 2022-04-12 PROCEDURE — 02HV33Z INSERTION OF INFUSION DEVICE INTO SUPERIOR VENA CAVA, PERCUTANEOUS APPROACH: ICD-10-PCS | Performed by: STUDENT IN AN ORGANIZED HEALTH CARE EDUCATION/TRAINING PROGRAM

## 2022-04-12 RX ADMIN — FLUCONAZOLE 400 MG: 2 INJECTION, SOLUTION INTRAVENOUS at 13:14

## 2022-04-12 RX ADMIN — LIDOCAINE HYDROCHLORIDE 0.5 ML: 10 SOLUTION INTRAVENOUS at 10:47

## 2022-04-12 RX ADMIN — PIPERACILLIN AND TAZOBACTAM 4500 MG: 4; .5 INJECTION, POWDER, LYOPHILIZED, FOR SOLUTION INTRAVENOUS at 17:30

## 2022-04-12 RX ADMIN — VENLAFAXINE HYDROCHLORIDE 37.5 MG: 37.5 CAPSULE, EXTENDED RELEASE ORAL at 08:05

## 2022-04-12 RX ADMIN — PIPERACILLIN AND TAZOBACTAM 4500 MG: 4; .5 INJECTION, POWDER, LYOPHILIZED, FOR SOLUTION INTRAVENOUS at 01:10

## 2022-04-12 RX ADMIN — PIPERACILLIN AND TAZOBACTAM 4500 MG: 4; .5 INJECTION, POWDER, LYOPHILIZED, FOR SOLUTION INTRAVENOUS at 08:57

## 2022-04-12 ASSESSMENT — PAIN SCALES - GENERAL: PAINLEVEL_OUTOF10: 0

## 2022-04-12 NOTE — PROCEDURES
PICC    Catheter insertion date: 4/12/2022     Product Number:  [id number] 42785 VPS2   Lot No: [id number]   Gauge: 17   Lumen: 4.5 fr single   R Brachial    Vein Diameter: 0.47cm   Mid upper arm circumference: 31cm   Catheter Length: 41cm   Internal Length: 40cm   External Catheter Length: 1cm   Ultrasound Used: yes  VPS [facility] [place] confirms PICC tip is placed in the lower 1/3 of the SVC or at the Cavoatrial junction. Floor nurse notified PICC is okay to use.    : GRANT Up RN

## 2022-04-12 NOTE — PROGRESS NOTES
GENERAL SURGERY  DAILY PROGRESS NOTE  4/12/2022    CHIEF COMPLAINT:  No chief complaint on file. SUBJECTIVE:  Continues to have pain with eating so has not been eating much. Having diarrhea. No dysuria. CT was repeated and was stable     OBJECTIVE:  BP (!) 156/90   Pulse 82   Temp 98 °F (36.7 °C) (Infrared)   Resp 16   Ht 5' 5\" (1.651 m)   Wt 151 lb (68.5 kg)   LMP 02/10/2011   SpO2 98%   BMI 25.13 kg/m²     GENERAL:  NAD. A&Ox3. LUNGS:  No increased work of breathing. CARDIOVASCULAR: RR  ABDOMEN:  Soft, non-distended, left lower quadrant tender still. No guarding, rigidity, rebound. ASSESSMENT/PLAN:  59 y.o. female with diverticulitis     ID recs for abx - continue zosyn and diflucan, recs on home abx regimen for dc  Per primary IR drain- will see if they are able to drain but no clear sizable drainable area   Pain and nausea control as needed   Low fiber diet   Monitor labs     Eloisa Shipman MD  Surgery Resident PGY-1  4/12/2022  5:24 AM     The patient was seen and examined and the chart was reviewed. I agree with the assessment and plan. The patient will have evaluation per interventional radiology regarding drainage of the abscess.

## 2022-04-12 NOTE — ADT AUTH CERT
Utilization Reviews         Diverticulitis, Acute - Care Day 5 (4/11/2022) by Aby Ramos RN       Review Status Review Entered   Completed 4/11/2022 15:11      Criteria Review      Care Day: 5 Care Date: 4/11/2022 Level of Care: Inpatient Floor    Guideline Day 3    Clinical Status    (X) * Hemodynamic stability    (X) * Pain absent or managed    (X) * Stable Hgb/Hct    (X) * No evidence of peritonitis or abscess    (X) * Surgery not indicated    (X) * Diet tolerated    (X) * Afebrile or fever improved    ( ) * Discharge plans and education understood    Activity    (X) * Ambulatory or acceptable for next level of care    Routes    (X) * Oral hydration    4/11/2022 3:11 PM EDT by Kathe King      yes    (X) * Oral medications or regimen acceptable for next level of care    4/11/2022 3:11 PM EDT by Kathe King      levothyroxine (SYNTHROID) tablet 50 mcg, venlafaxine (EFFEXOR XR) extended release capsule 37.5 mg    (X) * Oral diet or acceptable for next level of care    4/11/2022 3:11 PM EDT by Kathe King      Low fiber diet    Interventions    (X) CBC    4/11/2022 3:11 PM EDT by Kathe King      Lymphocytes % 16.4 , Lymphocytes Absolute 1.13    * Milestone   Additional Notes   DATE: 4/11 Day 5 Inpatient         Pertinent Updates: +nausea and feeling crampy after eating breakfast + Mid- Lower abdominal pain         Vitals: /86   Pulse 68   Temp 98 °F (36.7 °C) (Oral)   Resp 16   SpO2 95%         Abnl/Pertinent Labs/Radiology/Diagnostic Studies:   Lymphocytes % 16.4    Lymphocytes Absolute 1.13       Physical Exam:   Abdomen: Positive bowel sounds to auscultation. Left lower quad tender to palpation. No masses felt. No hepatosplenomegaly.       MD Consults/Assessments & Plans:   GENERAL SX CONSULT      ASSESSMENT/PLAN:   59 y.o. female with diverticulitis        ID recs for abx    Pain and nausea control as needed    Low fiber diet    Monitor labs    Discharge planning pending ID recs INFECTIOUS DSE CONSULT   Microbiology:        Diverticulitis with phlegmon   Follow-up for abscess formation   Fevers resolving       Plan:     Cont Zosyn 4.5 g IV every 8 extended infusion, day 4 (Prior to admission she was on cefdinir and Flagyl)   Diflucan 400 IV daily   Check cultures   May need to repeat ct    Monitor labs   Gen sx input    Will follow with you          Medications:   enoxaparin (LOVENOX) sc 40 mg daily   fluconazole (DIFLUCAN) in 0.9 % sodium chloride IVPB 400 mg    piperacillin-tazobactam (ZOSYN) 4,500 mg in dextrose 5 % 50 mL IVPB (mini-bag) every 8 hrs

## 2022-04-12 NOTE — CARE COORDINATION
Updated plan of care. PICC line inserted today. Continue Zosyn iv q 8/hr. Plan for IR drain of fluid collection. [facility] home care is still following.  WILL NEED FINAL ORDERS AND SCRIPT FOR HOME-mjo

## 2022-04-12 NOTE — PLAN OF CARE
Problem: Pain:  Goal: Pain level will decrease  Description: Pain level will decrease  4/12/2022 0058 by Aisha Zee RN  Outcome: Met This Shift  4/11/2022 1551 by Ruthy Ruiz RN  Outcome: Met This Shift  Goal: Control of acute pain  Description: Control of acute pain  4/12/2022 0058 by iAsha Zee RN  Outcome: Met This Shift  4/11/2022 1551 by Ruthy Ruiz RN  Outcome: Met This Shift  Goal: Control of chronic pain  Description: Control of chronic pain  Outcome: Met This Shift     Problem: SAFETY  Goal: Free from accidental physical injury  4/12/2022 0058 by Aisha Zee RN  Outcome: Met This Shift  4/11/2022 1551 by Ruthy Ruiz RN  Outcome: Met This Shift  Goal: Free from intentional harm  4/12/2022 0058 by Aisha Zee RN  Outcome: Met This Shift  4/11/2022 1551 by Ruthy Ruiz RN  Outcome: Met This Shift     Problem: DAILY CARE  Goal: Daily care needs are met  4/12/2022 0058 by Aisha Zee RN  Outcome: Met This Shift  4/11/2022 1551 by Ruthy Ruiz RN  Outcome: Met This Shift     Problem: PAIN  Goal: Patient's pain/discomfort is manageable  4/12/2022 0058 by Aisha eZe RN  Outcome: Met This Shift  4/11/2022 1551 by Ruthy Ruiz RN  Outcome: Met This Shift     Problem: SKIN INTEGRITY  Goal: Skin integrity is maintained or improved  Outcome: Met This Shift     Problem: KNOWLEDGE DEFICIT  Goal: Patient/S.O. demonstrates understanding of disease process, treatment plan, medications, and discharge instructions.   Outcome: Met This Shift     Problem: DISCHARGE BARRIERS  Goal: Patient's continuum of care needs are met  4/12/2022 0058 by Aisha eZe RN  Outcome: Met This Shift  4/11/2022 1551 by Ruthy Ruiz RN  Outcome: Met This Shift

## 2022-04-12 NOTE — PROGRESS NOTES
[address] [place] [address] [facility]  Progress Note    SUBJECTIVE:  CC: abdominal pain    Lying in bed, anxious/nervous for IR procedure  IV team present to place PICC line  Reports watery/mucous stools multiple times per day, + cramping and abdominal pain  No fevers       Review of systems:  As stated above in the chief complaint, otherwise negative. Medications:  Scheduled Meds:   lidocaine 1 % injection  5 mL IntraDERmal Once    sodium chloride flush  5-40 mL IntraVENous 2 times per day    piperacillin-tazobactam  4,500 mg IntraVENous Q8H    enoxaparin  40 mg SubCUTAneous Daily    levothyroxine  50 mcg Oral Daily    venlafaxine  37.5 mg Oral Daily with breakfast    fluconazole  400 mg IntraVENous Q24H    sodium chloride flush  5-40 mL IntraVENous 2 times per day     Continuous Infusions:   sodium chloride      sodium chloride       PRN Meds:sodium chloride flush, sodium chloride, sodium chloride flush, sodium chloride, ondansetron **OR** ondansetron, acetaminophen, oxyCODONE    OBJECTIVE:  /83   Pulse 80   Temp 98.6 °F (37 °C)   Resp 16   Ht 5' 5\" (1.651 m)   Wt 151 lb (68.5 kg)   LMP 02/10/2011   SpO2 96%   BMI 25.13 kg/m²   Temp  Av.3 °F (36.8 °C)  Min: 98 °F (36.7 °C)  Max: 98.6 °F (37 °C)  Constitutional: The patient is awake, alert, and oriented. Lying in bed, in no distress. Skin: Warm and dry. No rashes were noted. HEENT: Round and reactive pupils. Moist mucous membranes. No ulcerations or thrush. Neck: Supple to movements. Chest: No use of accessory muscles to breathe. Symmetrical expansion. No wheezing, crackles or rhonchi. Cardiovascular: S1 and S2 are rhythmic and regular. No murmurs appreciated. Abdomen: Positive bowel sounds to auscultation. Left lower quad tender to palpation. No masses felt.   Extremities: No edema  PIV      Laboratory and Tests Review:  Lab Results   Component Value Date    WBC 6.7 2022    WBC 6.9 2022    WBC 5.6 04/10/2022    HGB 13.5 04/12/2022    HCT 41.3 04/12/2022    MCV 88.4 04/12/2022     04/12/2022     Lab Results   Component Value Date    NEUTROABS 4.55 04/12/2022    NEUTROABS 5.09 04/11/2022    NEUTROABS 3.45 04/10/2022     No results found for: CRPHS  Lab Results   Component Value Date    ALT 20 03/17/2022    AST 17 03/17/2022    ALKPHOS 79 03/17/2022    BILITOT 0.5 03/17/2022     Lab Results   Component Value Date     04/12/2022    K 3.8 04/12/2022    K 3.9 04/07/2022     04/12/2022    CO2 25 04/12/2022    BUN 9 04/12/2022    CREATININE 1.0 04/12/2022    CREATININE 1.0 04/11/2022    CREATININE 0.9 04/10/2022    GFRAA >60 04/12/2022    LABGLOM 57 04/12/2022    GLUCOSE 94 04/12/2022    GLUCOSE 79 12/22/2010    PROT 7.7 03/17/2022    LABALBU 4.5 03/17/2022    LABALBU 4.3 12/22/2010    CALCIUM 9.3 04/12/2022    BILITOT 0.5 03/17/2022    ALKPHOS 79 03/17/2022    AST 17 03/17/2022    ALT 20 03/17/2022     No results found for: CRP  No results found for: [address]    Radiology:  4/11/2022 - CT Abd/Pelvis   Acute diverticulitis of the rectosigmoid colon with  persistent inflammatory   fluid and loculated collection in the right lower pelvis probably   inflammatory fluid/developing abscess collection without significant change. Surveillance is recommended. Dierdre Keen colonoscopy when feasible to exclude   underlying malignant lesion which is less likely. Microbiology:   No cultures     Assessment:  · Diverticulitis with phlegmon vs developing abscess  · Fevers resolving  · Diarrhea, rule out C.difficile     Plan:    · Cont Zosyn 4.5 g IV every 8 extended infusion, day 5  · Diflucan 400 IV daily  · IR to evaluate fluid collection - possible drainage today   · Check stools for C.difficile  · Labs reviewed   · PICC line for IV anitibiotics     GABY Shaver CNP  10:07 AM  4/12/2022. Pt seen and examined. Above discussed agree with advanced practice nurse.  Labs, cultures, and radiographs reviewed. Face to Face encounter occurred. Changes made as necessary.      Nirmala Campbell MD

## 2022-04-12 NOTE — PROGRESS NOTES
Collected patient stool sample, however sample of stool was formed and did not meet C-Diff criteria and would be rejected by lab. Dr. Kale Haskins aware and showed sample. C-diff sample order d/c'd.

## 2022-04-12 NOTE — PROGRESS NOTES
P Quality Flow/Interdisciplinary Rounds Progress Note        Quality Flow Rounds held on April 12, 2022    Disciplines Attending:  Bedside Nurse, ,  and Nursing Unit Leadership    Jake Sharpe was admitted on 4/7/2022 12:04 PM    Anticipated Discharge Date:  Expected Discharge Date: 04/10/22    Disposition:    Theo Score:  Theo Scale Score: 20    Readmission Risk              Risk of Unplanned Readmission:  9           Discussed patient goal for the day, patient clinical progression, and barriers to discharge.   The following Goal(s) of the Day/Commitment(s) have been identified:  Discharge [address] EBONY Kuo  April 12, 2022

## 2022-04-13 ENCOUNTER — APPOINTMENT (OUTPATIENT)
Dept: CT IMAGING | Age: 57
DRG: 391 | End: 2022-04-13
Attending: SURGERY
Payer: COMMERCIAL

## 2022-04-13 LAB
ANION GAP SERPL CALCULATED.3IONS-SCNC: 11 MMOL/L (ref 7–16)
BUN BLDV-MCNC: 11 MG/DL (ref 6–20)
CALCIUM SERPL-MCNC: 9.2 MG/DL (ref 8.6–10.2)
CHLORIDE BLD-SCNC: 102 MMOL/L (ref 98–107)
CO2: 25 MMOL/L (ref 22–29)
CREAT SERPL-MCNC: 1 MG/DL (ref 0.5–1)
GFR AFRICAN AMERICAN: >60
GFR NON-AFRICAN AMERICAN: 57 ML/MIN/1.73
GLUCOSE BLD-MCNC: 96 MG/DL (ref 74–99)
INR BLD: 1
POTASSIUM SERPL-SCNC: 3.8 MMOL/L (ref 3.5–5)
PROTHROMBIN TIME: 11.3 SEC (ref 9.3–12.4)
REASON FOR REJECTION: NORMAL
REJECTED TEST: NORMAL
SODIUM BLD-SCNC: 138 MMOL/L (ref 132–146)

## 2022-04-13 PROCEDURE — 36415 COLL VENOUS BLD VENIPUNCTURE: CPT

## 2022-04-13 PROCEDURE — 1200000000 HC SEMI PRIVATE

## 2022-04-13 PROCEDURE — 2580000003 HC RX 258: Performed by: SPECIALIST

## 2022-04-13 PROCEDURE — 80048 BASIC METABOLIC PNL TOTAL CA: CPT

## 2022-04-13 PROCEDURE — 87070 CULTURE OTHR SPECIMN AEROBIC: CPT

## 2022-04-13 PROCEDURE — 87075 CULTR BACTERIA EXCEPT BLOOD: CPT

## 2022-04-13 PROCEDURE — 2709999900 CT ABSCESS DRAINAGE W CATH PLACEMENT S&I

## 2022-04-13 PROCEDURE — 87205 SMEAR GRAM STAIN: CPT

## 2022-04-13 PROCEDURE — 6360000002 HC RX W HCPCS: Performed by: SPECIALIST

## 2022-04-13 PROCEDURE — 6370000000 HC RX 637 (ALT 250 FOR IP)

## 2022-04-13 PROCEDURE — 0W9J3ZZ DRAINAGE OF PELVIC CAVITY, PERCUTANEOUS APPROACH: ICD-10-PCS | Performed by: STUDENT IN AN ORGANIZED HEALTH CARE EDUCATION/TRAINING PROGRAM

## 2022-04-13 PROCEDURE — 2500000003 HC RX 250 WO HCPCS: Performed by: STUDENT IN AN ORGANIZED HEALTH CARE EDUCATION/TRAINING PROGRAM

## 2022-04-13 PROCEDURE — 85610 PROTHROMBIN TIME: CPT

## 2022-04-13 PROCEDURE — 6360000002 HC RX W HCPCS: Performed by: STUDENT IN AN ORGANIZED HEALTH CARE EDUCATION/TRAINING PROGRAM

## 2022-04-13 RX ORDER — FLUCONAZOLE 100 MG/1
400 TABLET ORAL DAILY
Qty: 56 TABLET | Refills: 0 | Status: SHIPPED | OUTPATIENT
Start: 2022-04-13 | End: 2022-04-27

## 2022-04-13 RX ORDER — LIDOCAINE HYDROCHLORIDE 20 MG/ML
INJECTION, SOLUTION INFILTRATION; PERINEURAL
Status: COMPLETED | OUTPATIENT
Start: 2022-04-13 | End: 2022-04-13

## 2022-04-13 RX ORDER — FENTANYL CITRATE 50 UG/ML
INJECTION, SOLUTION INTRAMUSCULAR; INTRAVENOUS
Status: COMPLETED | OUTPATIENT
Start: 2022-04-13 | End: 2022-04-13

## 2022-04-13 RX ORDER — MIDAZOLAM HYDROCHLORIDE 2 MG/2ML
INJECTION, SOLUTION INTRAMUSCULAR; INTRAVENOUS
Status: COMPLETED | OUTPATIENT
Start: 2022-04-13 | End: 2022-04-13

## 2022-04-13 RX ADMIN — LIDOCAINE HYDROCHLORIDE 10 ML: 20 INJECTION, SOLUTION INFILTRATION; PERINEURAL at 09:07

## 2022-04-13 RX ADMIN — VENLAFAXINE HYDROCHLORIDE 37.5 MG: 37.5 CAPSULE, EXTENDED RELEASE ORAL at 10:53

## 2022-04-13 RX ADMIN — LEVOTHYROXINE SODIUM 50 MCG: 50 TABLET ORAL at 10:45

## 2022-04-13 RX ADMIN — MIDAZOLAM HYDROCHLORIDE 1 MG: 1 INJECTION, SOLUTION INTRAMUSCULAR; INTRAVENOUS at 09:04

## 2022-04-13 RX ADMIN — PIPERACILLIN AND TAZOBACTAM 4500 MG: 4; .5 INJECTION, POWDER, LYOPHILIZED, FOR SOLUTION INTRAVENOUS at 10:46

## 2022-04-13 RX ADMIN — FLUCONAZOLE 400 MG: 2 INJECTION, SOLUTION INTRAVENOUS at 16:17

## 2022-04-13 RX ADMIN — PIPERACILLIN AND TAZOBACTAM 4500 MG: 4; .5 INJECTION, POWDER, LYOPHILIZED, FOR SOLUTION INTRAVENOUS at 01:54

## 2022-04-13 RX ADMIN — FENTANYL CITRATE 50 MCG: 50 INJECTION, SOLUTION INTRAMUSCULAR; INTRAVENOUS at 09:04

## 2022-04-13 RX ADMIN — PIPERACILLIN AND TAZOBACTAM 4500 MG: 4; .5 INJECTION, POWDER, LYOPHILIZED, FOR SOLUTION INTRAVENOUS at 18:28

## 2022-04-13 ASSESSMENT — PAIN SCALES - GENERAL
PAINLEVEL_OUTOF10: 0

## 2022-04-13 ASSESSMENT — PAIN - FUNCTIONAL ASSESSMENT: PAIN_FUNCTIONAL_ASSESSMENT: 0-10

## 2022-04-13 NOTE — PLAN OF CARE
Problem: Pain:  Description: Pain management should include both nonpharmacologic and pharmacologic interventions.   Goal: Pain level will decrease  Description: Pain level will decrease  Outcome: Met This Shift  Goal: Control of acute pain  Description: Control of acute pain  Outcome: Met This Shift     Problem: SAFETY  Goal: Free from accidental physical injury  Outcome: Met This Shift     Problem: DAILY CARE  Goal: Daily care needs are met  Outcome: Met This Shift     Problem: PAIN  Goal: Patient's pain/discomfort is manageable  Outcome: Met This Shift     Problem: SKIN INTEGRITY  Goal: Skin integrity is maintained or improved  Outcome: Met This Shift

## 2022-04-13 NOTE — PRE SEDATION
Sedation Pre-Procedure Note    Patient Name: Mela Loredo   YOB: 1965  Room/Bed: 6042/6802-Z  Medical Record Number: [id number]  Date: 4/13/2022   Time: 8:54 AM       Indication:  CT guided needle aspiration of pelvic fluid, possible drain insertion    Consent: I have discussed with the patient and/or the patient representative the indication, alternatives, and the possible risks and/or complications of the planned procedure and the anesthesia methods. The patient and/or patient representative appear to understand and agree to proceed. Vital Signs:   Vitals:    04/13/22 0846   BP: (!) 147/94   Pulse: 84   Resp: 18   Temp:    SpO2: 96%       Past Medical History:   has a past medical history of Anxiety, Blood transfusion, Breast cancer ([facility] [address] Diverticulosis, History of therapeutic radiation, Hx antineoplastic chemo, Hypothyroidism, PONV (postoperative nausea and vomiting), Pure hypercholesterolemia, and Thyroid disease. Past Surgical History:   has a past surgical history that includes Ectopic pregnancy surgery (1981); Breast lumpectomy (Left, 2010); Colonoscopy (N/A, 5/21/2020); and picc line insertion nurse (4/12/2022). Medications:   Scheduled Meds:    sodium chloride flush  5-40 mL IntraVENous 2 times per day    piperacillin-tazobactam  4,500 mg IntraVENous Q8H    enoxaparin  40 mg SubCUTAneous Daily    levothyroxine  50 mcg Oral Daily    venlafaxine  37.5 mg Oral Daily with breakfast    fluconazole  400 mg IntraVENous Q24H    sodium chloride flush  5-40 mL IntraVENous 2 times per day     Continuous Infusions:    sodium chloride      sodium chloride       PRN Meds: sodium chloride flush, sodium chloride, sodium chloride flush, sodium chloride, ondansetron **OR** ondansetron, acetaminophen, oxyCODONE  Home Meds:   Prior to Admission medications    Medication Sig Start Date End Date Taking?  Authorizing Provider   venlafaxine (EFFEXOR XR) 37.5 MG extended release capsule TAKE 1 CAPSULE BY MOUTH ONCE DAILY 3/31/22   Lizabeth Alfaro MD   levothyroxine (SYNTHROID) 50 MCG tablet TAKE ONE TABLET BY MOUTH EVERY DAY 3/31/22   Lizabeth Alfaro MD   Multiple Vitamins-Minerals (THERAPEUTIC MULTIVITAMIN-MINERALS) tablet Take 1 tablet by mouth daily    Historical Provider, MD   calcium carbonate 600 MG TABS tablet Take 1 tablet by mouth 2 times daily    Historical Provider, MD     Coumadin Use Last 7 Days:  no  Antiplatelet drug therapy use last 7 days: no  Other anticoagulant use last 7 days: no  Additional Medication Information:  none      Pre-Sedation Documentation and Exam:   Vital signs have been reviewed (see flow sheet for vitals).     Mallampati Airway Assessment:  Mallampati Class I - (soft palate, fauces, uvula & anterior/posterior tonsillar pillars are visible)    Prior History of Anesthesia Complications:   none    ASA Classification:  Class 2 - A normal healthy patient with mild systemic disease    Sedation/ Anesthesia Plan:   intravenous sedation    Medications Planned:   midazolam (Versed) intravenously and fentanyl intravenously    Patient is an appropriate candidate for plan of sedation: yes    Electronically signed by Issac Browne MD on 4/13/2022 at 8:54 AM

## 2022-04-13 NOTE — CARE COORDINATION
Social Work:    Canceled discharge home today with Brandee at [facility].      Electronically signed by ANGELI Hernandez on 4/13/2022 at 1:52 PM

## 2022-04-13 NOTE — PROGRESS NOTES
P Quality Flow/Interdisciplinary Rounds Progress Note        Quality Flow Rounds held on April 13, 2022    Disciplines Attending:  Bedside Nurse, ,  and Nursing Unit [address] [place] [address] was admitted on 4/7/2022 12:04 PM    Anticipated Discharge Date:  Expected Discharge Date: 04/10/22    Disposition:    Theo Score:  Theo Scale Score: 21    Readmission Risk              Risk of Unplanned Readmission:  9           Discussed patient goal for the day, patient clinical progression, and barriers to discharge.   The following Goal(s) of the Day/Commitment(s) have been identified:  Discharge [address] Last RN  April 13, 2022

## 2022-04-13 NOTE — PROGRESS NOTES
Brief post procedure note    Diagnosis: diverticulitis w/ pelvic fluid    Procedure: CT needle aspiration    Findings:   17G needle inserted into pelvic fluid.   approx 100cc serous fluid was aspirated, no pus  No drain inserted       Specimen: 80cc serous fluid    EBL: min    Complication: none    Plan:   -F/u fluid cultures

## 2022-04-13 NOTE — OR NURSING
Patient taken to CT and positioned prone on exam table with O2 and monitoring equipment attached. Consent signed by patient. Patient scanned and images reviewed by Dr. Alex Ruiz. Patient prepped and draped per protocol. Drain unable to be placed therefore 140 cc serous fluid aspirated. Specimen obtained and sent to lab for fluid culture/gram stain/anaerobic culture. Patient re-scanned. Puncture site cleansed. Patient tolerated well. Report called to floor RN and discharge instructions reviewed with patient.

## 2022-04-13 NOTE — PROGRESS NOTES
[address] [place] [address] [facility]  Progress Note    SUBJECTIVE:  CC: abdominal pain    Sitting up in bed, in no distress  Tolerating antibiotics  Pain is much improved  She would like to go home      Review of systems:  As stated above in the chief complaint, otherwise negative. Medications:  Scheduled Meds:   sodium chloride flush  5-40 mL IntraVENous 2 times per day    piperacillin-tazobactam  4,500 mg IntraVENous Q8H    enoxaparin  40 mg SubCUTAneous Daily    levothyroxine  50 mcg Oral Daily    venlafaxine  37.5 mg Oral Daily with breakfast    fluconazole  400 mg IntraVENous Q24H    sodium chloride flush  5-40 mL IntraVENous 2 times per day     Continuous Infusions:   sodium chloride      sodium chloride       PRN Meds:sodium chloride flush, sodium chloride, sodium chloride flush, sodium chloride, ondansetron **OR** ondansetron, acetaminophen, oxyCODONE    OBJECTIVE:  /70   Pulse 97   Temp 98.9 °F (37.2 °C) (Oral)   Resp 17   Ht 5' 5\" (1.651 m)   Wt 151 lb (68.5 kg)   LMP 02/10/2011   SpO2 100%   BMI 25.13 kg/m²   Temp  Av.2 °F (36.8 °C)  Min: 97.5 °F (36.4 °C)  Max: 98.9 °F (37.2 °C)  Constitutional: The patient is awake, alert, and oriented. Sitting up in bed, in no distress. Skin: Warm and dry. No rashes were noted. HEENT: Round and reactive pupils. Moist mucous membranes. No ulcerations or thrush. Neck: Supple to movements. Chest: No use of accessory muscles to breathe. Symmetrical expansion. No wheezing, crackles or rhonchi. Cardiovascular: S1 and S2 are rhythmic and regular. No murmurs appreciated. Abdomen: Positive bowel sounds to auscultation. Left lower quad tender to palpation- improving. No masses felt.   Extremities: No edema  PICC right arm 2022    Laboratory and Tests Review:  Lab Results   Component Value Date    WBC 6.7 2022    WBC 6.9 2022    WBC 5.6 04/10/2022    HGB 13.5 2022    HCT 41.3 2022    MCV 88.4 04/12/2022     04/12/2022     Lab Results   Component Value Date    NEUTROABS 4.55 04/12/2022    NEUTROABS 5.09 04/11/2022    NEUTROABS 3.45 04/10/2022     No results found for: CRPHS  Lab Results   Component Value Date    ALT 20 03/17/2022    AST 17 03/17/2022    ALKPHOS 79 03/17/2022    BILITOT 0.5 03/17/2022     Lab Results   Component Value Date     04/13/2022    K 3.8 04/13/2022    K 3.9 04/07/2022     04/13/2022    CO2 25 04/13/2022    BUN 11 04/13/2022    CREATININE 1.0 04/13/2022    CREATININE 1.0 04/12/2022    CREATININE 1.0 04/11/2022    GFRAA >60 04/13/2022    LABGLOM 57 04/13/2022    GLUCOSE 96 04/13/2022    GLUCOSE 79 12/22/2010    PROT 7.7 03/17/2022    LABALBU 4.5 03/17/2022    LABALBU 4.3 12/22/2010    CALCIUM 9.2 04/13/2022    BILITOT 0.5 03/17/2022    ALKPHOS 79 03/17/2022    AST 17 03/17/2022    ALT 20 03/17/2022     No results found for: CRP  No results found for: [address]    Radiology:  4/11/2022 - CT Abd/Pelvis   Acute diverticulitis of the rectosigmoid colon with  persistent inflammatory   fluid and loculated collection in the right lower pelvis probably   inflammatory fluid/developing abscess collection without significant change. Surveillance is recommended. Consider colonoscopy when feasible to exclude   underlying malignant lesion which is less likely. Microbiology:   No cultures     Assessment:  Diverticulitis with phlegmon vs developing abscess  Fevers resolving  Diarrhea, rule out C.difficile     Plan:    Cont Zosyn 4.5 g IV every 8 extended infusion, day 6 -- reconciled x 2 weeks  Diflucan 400 po x 2 more weeks   IR drained fluid collection - 100cc serous fluid - follow cultures   Labs reviewed   Ok to discharge from ID standpoint - will keep close eye on cultures throughout the weekend     GABY Matos - CNP  1:49 PM  4/13/2022. Pt seen and examined. Above discussed agree with advanced practice nurse. Labs, cultures, and radiographs reviewed.   Face to Face encounter occurred. Changes made as necessary.      Cosme Lira MD

## 2022-04-13 NOTE — CARE COORDINATION
Social Work:    Follow-up visit was made with Jason today. Jason is doing well and expects to discharge home today with [facility] for IVATB. Social work notified Jose Martinez at [facility].     Electronically signed by ANGELI Palumbo on 4/13/2022 at 11:03 AM

## 2022-04-13 NOTE — PROGRESS NOTES
GENERAL SURGERY  DAILY PROGRESS NOTE  4/13/2022    CHIEF COMPLAINT:  No chief complaint on file. SUBJECTIVE:  Pain improving still increases with eating but tolerated diet more yesterday. Having diarrhea. No dysuria. IR unable to accommodate her yesterday     OBJECTIVE:  /70   Pulse 87   Temp 97.5 °F (36.4 °C) (Oral)   Resp 16   Ht 5' 5\" (1.651 m)   Wt 151 lb (68.5 kg)   LMP 02/10/2011   SpO2 99%   BMI 25.13 kg/m²     GENERAL:  NAD. A&Ox3. LUNGS:  No increased work of breathing. CARDIOVASCULAR: RR  ABDOMEN:  Soft, non-distended, left lower quadrant tender still. No guarding, rigidity, rebound.     ASSESSMENT/PLAN:  59 y.o. female with diverticulitis     ID recs for abx - continue zosyn and diflucan, recs on home abx regimen for dc  Per primary IR drain- hopefully drain today   NPO until after IR procedure   Pain and nausea control as needed   Low fiber diet after IR   Monitor labs   If leave a drain will need HHC and teaching prior to dc  PICC in place for home IV abx   Elroy Flores MD  Surgery Resident PGY-1  4/13/2022  5:39 AM

## 2022-04-13 NOTE — CARE COORDINATION
Social Work:    Follow-up visit was made with Jason today. Jason is doing well and expects to discharge home today with [facility] for IVATB. Await ID final IVATB plan. Notified Asa Shed at [facility].     Electronically signed by ANGELI Palumbo on 4/13/2022 at 11:06 AM

## 2022-04-14 VITALS
SYSTOLIC BLOOD PRESSURE: 138 MMHG | HEART RATE: 88 BPM | DIASTOLIC BLOOD PRESSURE: 75 MMHG | BODY MASS INDEX: 25.16 KG/M2 | HEIGHT: 65 IN | OXYGEN SATURATION: 95 % | WEIGHT: 151 LBS | TEMPERATURE: 98.3 F | RESPIRATION RATE: 18 BRPM

## 2022-04-14 LAB
ANION GAP SERPL CALCULATED.3IONS-SCNC: 12 MMOL/L (ref 7–16)
BASOPHILS ABSOLUTE: 0.03 E9/L (ref 0–0.2)
BASOPHILS RELATIVE PERCENT: 0.5 % (ref 0–2)
BUN BLDV-MCNC: 12 MG/DL (ref 6–20)
CALCIUM SERPL-MCNC: 9.1 MG/DL (ref 8.6–10.2)
CHLORIDE BLD-SCNC: 101 MMOL/L (ref 98–107)
CO2: 23 MMOL/L (ref 22–29)
CREAT SERPL-MCNC: 1 MG/DL (ref 0.5–1)
EOSINOPHILS ABSOLUTE: 0.14 E9/L (ref 0.05–0.5)
EOSINOPHILS RELATIVE PERCENT: 2.1 % (ref 0–6)
GFR AFRICAN AMERICAN: >60
GFR NON-AFRICAN AMERICAN: 57 ML/MIN/1.73
GLUCOSE BLD-MCNC: 101 MG/DL (ref 74–99)
GRAM STAIN ORDERABLE: NORMAL
HCT VFR BLD CALC: 39.2 % (ref 34–48)
HEMOGLOBIN: 12.8 G/DL (ref 11.5–15.5)
IMMATURE GRANULOCYTES #: 0.01 E9/L
IMMATURE GRANULOCYTES %: 0.2 % (ref 0–5)
LYMPHOCYTES ABSOLUTE: 1.32 E9/L (ref 1.5–4)
LYMPHOCYTES RELATIVE PERCENT: 19.9 % (ref 20–42)
MCH RBC QN AUTO: 28.4 PG (ref 26–35)
MCHC RBC AUTO-ENTMCNC: 32.7 % (ref 32–34.5)
MCV RBC AUTO: 87.1 FL (ref 80–99.9)
MONOCYTES ABSOLUTE: 0.61 E9/L (ref 0.1–0.95)
MONOCYTES RELATIVE PERCENT: 9.2 % (ref 2–12)
NEUTROPHILS ABSOLUTE: 4.53 E9/L (ref 1.8–7.3)
NEUTROPHILS RELATIVE PERCENT: 68.1 % (ref 43–80)
PDW BLD-RTO: 13.1 FL (ref 11.5–15)
PLATELET # BLD: 260 E9/L (ref 130–450)
PMV BLD AUTO: 10.4 FL (ref 7–12)
POTASSIUM SERPL-SCNC: 3.8 MMOL/L (ref 3.5–5)
RBC # BLD: 4.5 E12/L (ref 3.5–5.5)
SODIUM BLD-SCNC: 136 MMOL/L (ref 132–146)
WBC # BLD: 6.6 E9/L (ref 4.5–11.5)

## 2022-04-14 PROCEDURE — 36415 COLL VENOUS BLD VENIPUNCTURE: CPT

## 2022-04-14 PROCEDURE — 85025 COMPLETE CBC W/AUTO DIFF WBC: CPT

## 2022-04-14 PROCEDURE — 6370000000 HC RX 637 (ALT 250 FOR IP)

## 2022-04-14 PROCEDURE — 6360000002 HC RX W HCPCS: Performed by: SPECIALIST

## 2022-04-14 PROCEDURE — 80048 BASIC METABOLIC PNL TOTAL CA: CPT

## 2022-04-14 PROCEDURE — 2580000003 HC RX 258: Performed by: SPECIALIST

## 2022-04-14 RX ADMIN — VENLAFAXINE HYDROCHLORIDE 37.5 MG: 37.5 CAPSULE, EXTENDED RELEASE ORAL at 10:46

## 2022-04-14 RX ADMIN — LEVOTHYROXINE SODIUM 50 MCG: 50 TABLET ORAL at 07:35

## 2022-04-14 RX ADMIN — PIPERACILLIN AND TAZOBACTAM 4500 MG: 4; .5 INJECTION, POWDER, LYOPHILIZED, FOR SOLUTION INTRAVENOUS at 09:02

## 2022-04-14 RX ADMIN — PIPERACILLIN AND TAZOBACTAM 4500 MG: 4; .5 INJECTION, POWDER, LYOPHILIZED, FOR SOLUTION INTRAVENOUS at 01:33

## 2022-04-14 NOTE — PLAN OF CARE
Problem: Pain:  Goal: Pain level will decrease  Description: Pain level will decrease  4/14/2022 0701 by Steffani Meigs, RN  Outcome: Met This Shift    Problem: SAFETY  Goal: Free from accidental physical injury  4/14/2022 0701 by Steffani Meigs, RN  Outcome: Met This Shift      Problem: PAIN  Goal: Patient's pain/discomfort is manageable  4/14/2022 0701 by Steffani Meigs, RN  Outcome: Met This Shift

## 2022-04-14 NOTE — ADT AUTH CERT
Utilization Reviews         4/10 clinical review by Kaylene Werner RN       Review Status Review Entered   In Primary 4/14/2022 09:17      Criteria Review   4/10 - m/s unit      Pertinent Updates:  +7/10 LLQ abd pain   +nausea      Vitals:  98.6 16 78 117/83  97% ra      Abnl/Pertinent Labs/Radiology/Diagnostic Studies:  cl 93  anion gap 24         Physical Exam:  Abdomen: Positive bowel sounds to auscultation. Left lower quad tender   to palpation. No masses felt. No hepatosplenomegaly.     SURG A/P:  Diverticulitis  Still with some LLQ tenderness  Tolerating low res diet  ID following     Medications:  cont sq lovenox 40mg qd  cont synthroid 50mcg qd  cont effexor 37.5mg qd  cont iv zosyn 4500mg q8h   cont iv diflucan 400mg qd   cont iv dilaudid 0.25mg or 0.5mg q4h prn pain . Kirby Ra x1 dose of 0.5mg   cont iv zofran 4mg q6h prn n/v ..  x1 dose     d/c plan: home when medically stable; CM following for possible home iv   [facility].

## 2022-04-14 NOTE — CARE COORDINATION
Social work:    [facility] is aware of discharge home today and will be at patient's home for her 5:00 p.m. IVATB.     Electronically signed by ANGELI Snow on 4/14/2022 at 9:28 AM

## 2022-04-14 NOTE — PROGRESS NOTES
CLINICAL PHARMACY NOTE: MEDS TO BEDS    Total # of Prescriptions Filled: 1   The following medications were delivered to the patient:  · Fluconazole 100 mg    Additional Documentation:

## 2022-04-14 NOTE — PROGRESS NOTES
[address] [place] [address] [facility]  Progress Note    SUBJECTIVE:  CC: abdominal pain    Sitting up in bed, in no distress  Tolerating antibiotics  Pain is much improved  Loose stools, some times watery, lab rejected samples as they were to formed. Review of systems:  As stated above in the chief complaint, otherwise negative. Medications:  Scheduled Meds:   sodium chloride flush  5-40 mL IntraVENous 2 times per day    piperacillin-tazobactam  4,500 mg IntraVENous Q8H    enoxaparin  40 mg SubCUTAneous Daily    levothyroxine  50 mcg Oral Daily    venlafaxine  37.5 mg Oral Daily with breakfast    fluconazole  400 mg IntraVENous Q24H    sodium chloride flush  5-40 mL IntraVENous 2 times per day     Continuous Infusions:   sodium chloride      sodium chloride       PRN Meds:sodium chloride flush, sodium chloride, sodium chloride flush, sodium chloride, ondansetron **OR** ondansetron, acetaminophen, oxyCODONE    OBJECTIVE:  /75   Pulse 88   Temp 98.3 °F (36.8 °C) (Oral)   Resp 18   Ht 5' 5\" (1.651 m)   Wt 151 lb (68.5 kg)   LMP 02/10/2011   SpO2 95%   BMI 25.13 kg/m²   Temp  Av.6 °F (37 °C)  Min: 98.2 °F (36.8 °C)  Max: 99.5 °F (37.5 °C)  Constitutional: The patient is awake, alert, and oriented. Sitting up in bed, in no distress. Skin: Warm and dry. No rashes were noted. HEENT: Round and reactive pupils. Moist mucous membranes. No ulcerations or thrush. Neck: Supple to movements. Chest: No use of accessory muscles to breathe. Symmetrical expansion. No wheezing, crackles or rhonchi. Cardiovascular: S1 and S2 are rhythmic and regular. No murmurs appreciated. Abdomen: Positive bowel sounds to auscultation. Left lower quad tender to palpation- improving. No masses felt.   Extremities: No edema  PICC right arm 2022    Laboratory and Tests Review:  Lab Results   Component Value Date    WBC 6.6 2022    WBC 6.7 2022    WBC 6.9 2022    HGB 12.8 04/14/2022    HCT 39.2 04/14/2022    MCV 87.1 04/14/2022     04/14/2022     Lab Results   Component Value Date    NEUTROABS 4.53 04/14/2022    NEUTROABS 4.55 04/12/2022    NEUTROABS 5.09 04/11/2022     No results found for: CRPHS  Lab Results   Component Value Date    ALT 20 03/17/2022    AST 17 03/17/2022    ALKPHOS 79 03/17/2022    BILITOT 0.5 03/17/2022     Lab Results   Component Value Date     04/14/2022    K 3.8 04/14/2022    K 3.9 04/07/2022     04/14/2022    CO2 23 04/14/2022    BUN 12 04/14/2022    CREATININE 1.0 04/14/2022    CREATININE 1.0 04/13/2022    CREATININE 1.0 04/12/2022    GFRAA >60 04/14/2022    LABGLOM 57 04/14/2022    GLUCOSE 101 04/14/2022    GLUCOSE 79 12/22/2010    PROT 7.7 03/17/2022    LABALBU 4.5 03/17/2022    LABALBU 4.3 12/22/2010    CALCIUM 9.1 04/14/2022    BILITOT 0.5 03/17/2022    ALKPHOS 79 03/17/2022    AST 17 03/17/2022    ALT 20 03/17/2022     No results found for: CRP  No results found for: [address]    Radiology:  4/11/2022 - CT Abd/Pelvis   Acute diverticulitis of the rectosigmoid colon with  persistent inflammatory   fluid and loculated collection in the right lower pelvis probably   inflammatory fluid/developing abscess collection without significant change. Surveillance is recommended. Consider colonoscopy when feasible to exclude   underlying malignant lesion which is less likely.      Microbiology:   Cultures from 4/13/2022 Gram stain showed grade number white cells no organisms seen    Assessment:  · Diverticulitis with phlegmon vs developing abscess  · Fevers resolving  · Diarrhea, rule out C.difficile     Plan:    · Cont Zosyn 4.5 g IV every 8 extended infusion, day 7 -- reconciled x 2 weeks  · Diflucan 400 po x 2 more weeks   · IR drained fluid collection - 100cc serous fluid - follow cultures   · Labs reviewed   · Ok to discharge from ID standpoint -   · Start Kefir probiotic-she is aware it is at grocery store  · Given specimen cup in the event her stools get worse at home-will then order cdiff  · will keep close eye on cultures throughout the weekend -neg to date    Katarina Redd, APRN - CNP  8:37 AM  4/14/2022. Pt seen and examined. Above discussed agree with advanced practice nurse. Labs, cultures, and radiographs reviewed. Face to Face encounter occurred. Changes made as necessary.      Jazmin Castro MD

## 2022-04-14 NOTE — PROGRESS NOTES
Nutrition Assessment     Type and Reason for Visit: Initial,RD Nutrition Re-Screen/LOS (RD Re-screen Negative)    Nutrition Assessment:  Pt assessed per LOS protocol. Chart reviewed. Pt currently eating ~75% of most meals and w/ no other significant nutritional issues noted at this time. Will follow-up per policy. Please consult if RD needed.     Electronically signed by Evette Razo RD, CRISTIAN on 4/14/22 at 9:07 AM EDT    Contact: ext [phone]

## 2022-04-15 ENCOUNTER — TELEPHONE (OUTPATIENT)
Dept: PRIMARY CARE CLINIC | Age: 57
End: 2022-04-15

## 2022-04-15 NOTE — TELEPHONE ENCOUNTER
Bre 45 Transitions Initial Follow Up Call    Outreach made within 2 business days of discharge: Yes    Patient: Kevin Greenberg Patient : 1965   MRN: [id number]  Reason for Admission: Acute diverticulitis   Discharge Date: 22       Spoke with: Sanjuana Martinez     Discharge department/facility: [facility]     TCM Interactive Patient Contact:  Was patient able to fill all prescriptions: Yes  Is patient taking all medications as directed in the discharge summary? Yes  Does patient understand their discharge instructions: Yes  Does patient have questions or concerns that need addressed prior to 7-14 day follow up office visit: no    Patient states she already made follow up appointments with Dr. Sana Yen and Dr. Claire Benito. She is scheduled to see PCP in July and is comfortable with that time frame. Does not wish to schedule anything sooner with Dr. Vonnie Miles at this time.       Scheduled appointment with PCP within 7-14 days    Follow Up  Future Appointments   Date Time Provider Ncio Best   2022  1:30 PM Aylin Mark, APRN - CNP [facility]   2022  8:00 AM MD SWATI Brownlee HP       Melyssa Fuller

## 2022-04-16 LAB
BODY FLUID CULTURE, STERILE: NORMAL
GRAM STAIN RESULT: NORMAL

## 2022-04-18 LAB — ANAEROBIC CULTURE: NORMAL

## 2022-04-18 NOTE — DISCHARGE SUMMARY
Physician Discharge Summary     Patient ID:  General Marie  [id number]  64 y.o.  1965    Admit date: 4/7/2022    Discharge date and time: 4/14/2022  1:41 PM     Admitting Physician: Keon Cazares MD     Admission Diagnoses: Acute diverticulitis [K57.92]  Diverticulitis [K57.92]    Discharge Diagnoses: Principal Problem:    Acute diverticulitis  Active Problems:    Diverticulitis  Resolved Problems:    * No resolved hospital problems. *      Admission Condition: good    Discharged Condition: stable    Indication for Admission: acute diverticulitis     Hospital Course/Procedures/Operation/treatments:   4/7:Shantal Goddard is a 59 y.o. female with hx diverticulitis (5-6 episodes) who presented from clinic with LLQ abdominal pain for several days which has been worsening. She was treated last week with PO antibiotics but did not improve and saw Dr. Karissa Stevenson in clinic. Also states she has been having several days of diarrhea  She denies fevers, weight loss, bloody or dark bowel movements, nausea, vomiting. Her last colonoscopy was < 10 years ago but she states it was normal. Denies anticoagulation  4/8:Improved today Pain has been well controlled, small BM with just mucous. CTAP with phlegmon and inflammatory changes without any formed abscess at this time. Denies fevers, n/v overnight.  4/9:Overall, the patient feels well. The patient states that her pain is improved  4/10:Diverticulitis. Still with some LLQ tenderness  4/11: Tolerating pain without issue, feeling much better today. Had some diarrhea yesterday and has been having regular BM's. No nausea or vomiting    4/12:Continues to have pain with eating so has not been eating much. Having diarrhea. No dysuria. CT was repeated and was stable  4/13:Pain improving still increases with eating but tolerated diet more yesterday. Having diarrhea. No dysuria.  IR unable to accommodate her yesterday   4/14:okay for dc            Consults:   IP CONSULT TO INFECTIOUS DISEASES  IP CONSULT TO HOME CARE NEEDS    Significant Diagnostic Studies:   CT ABDOMEN PELVIS W IV CONTRAST Additional Contrast? Oral    Result Date: 4/7/2022  EXAMINATION: CT OF THE ABDOMEN AND PELVIS WITH CONTRAST 4/7/2022 9:44 pm TECHNIQUE: CT of the abdomen and pelvis was performed with the administration of intravenous contrast. Multiplanar reformatted images are provided for review. Dose modulation, iterative reconstruction, and/or weight based adjustment of the mA/kV was utilized to reduce the radiation dose to as low as reasonably achievable. COMPARISON: 03/17/2022 HISTORY: ORDERING SYSTEM PROVIDED HISTORY: Diverticulitis TECHNOLOGIST PROVIDED HISTORY: Additional Contrast?->Oral Reason for exam:->Diverticulitis FINDINGS: Gallbladder is unremarkable. Liver is homogeneous. Spleen is normal in size. Pancreas is unremarkable. No adrenal mass. No hydronephrosis. Tiny low-attenuation focus in the posterior left kidney is unchanged likely representing a tiny cyst although too small for definitive characterization. Similar focus in the upper pole on the left is also stable. Probable small hiatal hernia. Mild thickening of gastric folds likely due to underdistention. No bowel obstruction. The appendix is not well identified. Moderate to large stool burden. Extensive diverticulosis of the sigmoid colon with fairly extensive diffuse thickening of the sigmoid colon and rectosigmoid colon, appearing similar to slightly worsened from previous. No identified free air. Interval worsening free fluid in the pelvis likely due to phlegmon and probable developing abscesses. A somewhat localized fluid collection in the central pelvis adjacent to the rectosigmoid colon measures 3 x 2.8 cm and does not contain gas. Urinary bladder is unremarkable. Uterus has an unremarkable CT appearance. Lung bases are clear. Mild diffuse lumbar curve convex to the left. Minimal degenerative changes in the spine and pelvis.   Ovoid low-attenuation focus involving the right T9-10 neural foramen is unchanged dating back to 03/21/2020 and probably represents a small nerve root sheath cyst.     Extensive thickening of the sigmoid and rectosigmoid colon appearing similar to slightly worsened from previous and again thought to represent acute diverticulitis. Infiltrating neoplastic process less likely. Worsening fluid in the pelvis with findings concerning for phlegmon and possible developing abscesses. A particular somewhat localized fluid collection measures up to 3 cm and does not contain gas but is concerning for developing abscess. Continued follow-up recommended. Moderate to large stool burden. RECOMMENDATIONS: Unavailable       Discharge Exam:  OBJECTIVE:  /70   Pulse 87   Temp 97.5 °F (36.4 °C) (Oral)   Resp 16   Ht 5' 5\" (1.651 m)   Wt 151 lb (68.5 kg)   LMP 02/10/2011   SpO2 99%   BMI 25.13 kg/m²      GENERAL:  NAD. A&Ox3. LUNGS:  No increased work of breathing. CARDIOVASCULAR: RR  ABDOMEN:  Soft, non-distended, left lower quadrant tender still. No guarding, rigidity, rebound.     Disposition: home    In process/preliminary results:  Outstanding Order Results     Date and Time Order Name Status Description    4/13/2022  9:20 AM Culture, Anaerobic Preliminary           Patient Instructions:   Discharge Medication List as of 4/14/2022  2:00 PM           Details   fluconazole (DIFLUCAN) 100 MG tablet Take 4 tablets by mouth daily for 14 days, Disp-56 tablet, R-0Normal              Details   piperacillin-tazobactam (ZOSYN) infusion Infuse 4.5 g intravenously every 8 hours for 14 days Compound per protocol., Disp-189 g, R-0Print              Details   venlafaxine (EFFEXOR XR) 37.5 MG extended release capsule TAKE 1 CAPSULE BY MOUTH ONCE DAILY, Disp-90 capsule, R-1Normal      levothyroxine (SYNTHROID) 50 MCG tablet TAKE ONE TABLET BY MOUTH EVERY DAY, Disp-90 tablet, R-3Normal      Multiple Vitamins-Minerals (THERAPEUTIC MULTIVITAMIN-MINERALS) tablet Take 1 tablet by mouth dailyHistorical Med      calcium carbonate 600 MG TABS tablet Take 1 tablet by mouth 2 times dailyHistorical Med                   SURGERY DISCHARGE INSTRUCTIONS    You may be drowsy or lightheaded after receiving sedation or anesthesia. A responsible person should be with you for the next 24 hours. FOLLOW UP: Call office to schedule follow-up appointment in 2 weeks. DIET: Advance your diet as tolerated. Start with light diet and progress to your normal diet as you feel like eating. If you experience nausea or repeated episodes of vomiting which persist beyond 12-24 hours, notify your doctor. ACTIVITY: Rest today. Increase activity gradually. Recommend walking every day to help with return of bowel function and healing. No heavy lifting or strenuous activity for 24 hours- nothing over 15 lbs. No driving for 24 hours after a procedure. No driving while on prescription pain medication. SHOWER/BATHING: Okay to shower in 24 hours after procedure. WOUND CARE: You have a clean dressing applied. You may remove this dressing in 24 hours. You do not need a new dressing, but may apply one if you feel that your incisions are drainage. You do not need to apply anything over them. Avoid directly applying lotions, creams or oils to your incision. Keep incisions clean and dry. Always ensure you and your care giver clean hands before and after caring for the wound. You may place ice on incisions to decrease the pain and bruising. MEDICATIONS: Take as prescribed. Pain from the incision(s) is normal. The pain will vary from day to day and with any changes in your activity level, but it should gradually decrease over time. If you were given a prescription for a narcotic pain medication (percocet, norco, etc) you should NOT drink alcohol, drive, or operate any machinery. Do not take the narcotic medication if you are not having pain.  If your pain is mild, you may take over-the-counter ibuprofen or tylenol for pain as directed, limit total amount of acetaminophen (tylenol) to 3 grams per 24-hour period and please note that NSAIDs (ibuprofen) can cause bleeding or stomach upset. You may experience constipation while taking pain medication, strongly recommended to take over-the-counter stool softeners (Docusate/Colace or Senokot-S) while using pain medications - use as directed on bottle. Okay to resume anticoagulant medication after 24hrs. SPECIAL INSTRUCTIONS:   Call physician if you have any questions, to schedule a follow-up appointment, and if you notice any of the following:  · Fever (temperature over 101ºF) or chills  · Persistent nausea, vomiting, or bloating  · Severe pain that is not relieved by the prescribed pain medication  · Swelling or redness around your incision(s)  · No bowel movement and feeling uncomfortable  · Significant change in urination or no urine output for 8 hours  · Excess bleeding (from the rectum or incision) - more than ½ cup that does not stop      ===================================================================================    [facility]  ([address] [place] [address])  [address], [facility] [address]  Phone [phone]   Fax [phone]    [facility] SpaciousChichi Tejada MD, MD Chatianya Tracy MD Munir P. Gwendel Smothers, [address] Bhupendra Wang, MD Imani Christian, MD Madiha Leija, CNS  Makayla Osullivan, CNS     GABY Chou, CNS  Claus Barnett, CNP     Ursula Owens, CNS               STANDING ORDERS (ID Protocol)     1. Visiting nurses are to write the Primary Care Physician and their own call back number on all laboratory requisition forms. a. Abnormal lab values are called to the physician by the nurse and NOT by the laboratory. b. Fax all labs to the office in a timely manner, during office hours.  All faxes should include nurses name and call back number. 2. Vascular Access Devices or VADs (TLC, PICC, Midline, etc) will be replaced as necessary. a. Draw all blood work from VADs, except for drug levels. b. If unable to access a VAD, insert a peripheral catheter temporarily. Contact the Primary Care Physician or [facility] office for surgical referral.  c. Use tPA (Perrin Emily) as per agency protocol to restore patency of VAD. d. Remove VAD upon completion of IV antibiotics, unless otherwise specified by the ordering physician.  i. If VAD cannot be removed, schedule appointment at office for removal.  3. Notify ordering physician or office if patient requires admission to the hospital with reason for admission. 4. Discontinue all blood work upon completion of IV antibiotics, unless otherwise specified by ordering physician. 5. Notify ordering physician if the patient does not receive the scheduled antibiotic for 24 hours or more. 6. The Pharmacy and [address] [place] [address] may adjust the timing of the infusion and blood work to accommodate the patients home care conditions. 7. When PICC or VAD is to be removed, documentation of length of inserted PICC. PICC or VAD length is to correlate with inserted length and sent to physician at the time of removal.  8. Give the patient a list of antibiotics being administered with:  a. Drug name  b. Route  c. Frequency  d. Start/Stop date      ROUTINE LABS TO BE DRAWN/ORDERED:  1. Twice weekly (preferably every Monday & Thursday):  a. CMP  b. Complete Blood Count with differential (CBC with dif)  2. Once weekly:  a. C-Reactive Protein (not high sensitivity CRP)  b. Erythrocyte/Westergren Sedimentation Rate (WSR or ESR)  c. Total CPK for patients on Daptomycin (Cubicin®). Obtain CPK more often if the patient is experiencing muscle weakness or myalgias. 3. Clinical Pharmacist is to adjust Vancomycin and Aminoglycosides. Clinical pharmacist is  encouraged to follow:  \"Therapeutic Monitoring of Vancomycin for Serious Methicillin-resistant Staphylococcus aureus Infections: A Revised Consensus Guideline and Review by the American Society of Health-system Pharmacists, the Infectious Diseases Society of [address], the Pediatric Infectious Diseases Society, and the Society of Infectious Diseases Pharmacists\" (https://doi.org/10.1093/fiorella/jsza043). If a clinical calculator is not available, clinical pharmacist is to follow the orders below:  a. Draw Vancomycin trough 30 minutes before the third dose  i. After starting drug, or   ii. After the dosing or interval is changed. 1. If the trough level is between 5 and 20 continue dose as ordered. b. Draw troughs twice weekly thereafter until troughs are stable (i.e. until trough is between 10 and 20 mcg/ml for two consecutive laboratory values). i. Once stable check troughs once weekly or every third dose. c. Please do not call physician unless the trough is < 5 or >20.  i. If the trough is <20 continue dosing as ordered. ii. If the trough is >20 call the office for further orders. Do not hold the dose while waiting for the trough result. 4. Amingoglycosides (e.g. Gentamicin, Tobramycin and Amikacin) peaks and troughs should be drawn twice weekly (preferably on Mondays and Thursdays) or every third dose. a. Aminoglycoside peaks are not to be drawn if patient on Once-Daily Dosing (ODD). b. Call physician or office if the trough is:  i.    >1 for gentamicin,   ii. >2 for tobramycin, or   iii. >5 for amikacin  5. When clinically indicated obtain:  a. Urine culture. If the patient has a fever with purulent drainage from Best or suprapubic catheter, or foul smelling urine. Do not irrigate a clogged Best catheter. Replace it.  b. Blood cultures and Wound Gram stain with culture & sensitivity. If the patient has a fever or increasing drainage or foul odor from a wound. Notify the treating physician in a timely manner  c. Stool specimen.  If diarrhea occurs while on antibiotics, send stools for C. difficile and WBCs. 6. When a drug is discontinued due to a low white blood cell count (WBCs) draw two consecutive CBC with differential and BUN, Ceatinine. ALLERGIC OR ADVERSE REACTIONS TO MEDICATIONS  1. Mild reaction: (itching, with or without rash):  a. Administer Benadryl 50mg po x 1, then 25mg po q6h prn.   b. Notify office or physician in a timely matter. 2. Moderate reaction (itching with or without rash and/or wheezing, dyspnea, itchy throat):  a. Administer Benadryl 50 mg IV push x 1.   b. Notify office or physician in a timely manner. 3. Severe reaction i.e. Anaphylaxis (wheezing or stidor, sudden rash, lightheadedness, hypotension):  a. Administer epinephrine subcutaneous 0.3mg (1:1000) x 1 dose. b. May repeat twice every 5 minutes if needed. c. Call EMS and notify office or physician immediately. 4. For all above reactions: administer Solu-Cortef 100mg slow IV push x 1. VASCULAR ACCESS DRESSING CHANGE PROTOCOL  1. Cleanse insertion site with ChlorPrep® or equivalent three times. 2. Secure catheter with Steri-Strips®, Bone®, or equivalent securing device. 3. Apply Opsite® 3000 or equivalent transparent dressing. 4. Change dressing twice weekly, maintaining sterile technique. If there is a BioPatch®, SilverSite® or equivalent, change once weekly only or as needed. FOLLOW-UP VISITS  1. Nursing staff should call the office during business hours to schedule a follow-up appointment once the patient is admitted to the service or facility. Every effort should be made to have patient follow-up within 2 weeks of discharge. Exception is made for ventilator-dependent patients. 2. Continue IV antibiotic therapy until patient is seen in the office or unless specific stop date is noted on the original order or unless otherwise ordered by physician. 3. Call office to ensure stop date is correct before stopping antibiotics. Maria Teresa Faith MD Follow up:   [address] [facility] Raymond Simental MD  [address] [place] [address] [address] [place] [address]87    In 2 weeks   For routine followup    Golden Patton MD  [address] [place] [address] [address]  [address] [facility] [address]  [phone]    Schedule an appointment as soon as possible for a visit in 2 weeks  For outpatient follow up    Maria Ines Matute MD  [place] [phone]    Call         Signed:  Velva Prader, DO  4/18/2022  5:03 AM

## 2022-05-05 NOTE — PROGRESS NOTES
2021     Chief Complaint   Patient presents with    Hypothyroidism     HPI  Arabella Mitchell (:  1965) is a 60 y.o. female, here for evaluation of the following medical concerns:    Patient is a 49-year-old female with a past medical history of hypothyroidism, hyperlipidemia, vitamin D deficiency, dysthymia, malignant neoplasm of the left breast undergoing therapy with anastrozole, and diverticulosis who presents today to follow-up.     Anxiety/depression: Patient on venlafaxine 37.5 mg ext release tablets well without any issue. Stable. No SI/HI.     Patient reports she is stable on Synthroid 50 µg daily without any issues.     Diverticulitis Flare: Chronic changes on CT. Pt has seen w/Gen Surg.      History of malignant neoplasm of the breast: Patient will complete her 10 year post cancer treatment regimen 2021. Patient will need annual mammograms. Patient will need a DEXA in .     Completed COVID-19 vaccine series. Labs: Vitamin D level 65, TSH 2.5, free T4 1.19, total cholesterol 245, HDL 53, , triglycerides 152, CMP essentially within normal limits, CBC within normal limits. Review of Systems   Constitutional: Negative for chills and fever. HENT: Negative for congestion, rhinorrhea and sore throat. Respiratory: Negative for shortness of breath and wheezing. Cardiovascular: Negative for chest pain and leg swelling. Gastrointestinal: Negative for abdominal pain, constipation, diarrhea, nausea and vomiting. Skin: Negative for rash. Neurological: Negative for light-headedness and headaches.        Past Medical History:   Diagnosis Date    Anxiety     Blood transfusion      / ectopic pregnacy    Breast cancer ([facility] [address] 2010    left breast, treated with surgery,radiation and chemo    Diverticulosis     History of therapeutic radiation     Hx antineoplastic chemo     Hypothyroidism     PONV (postoperative nausea and vomiting)     Pure Refill requested for:     Trazadone    Last filled on:     10/7/21  #90  1 refill  Last office visit:     9/8/21  physical  Pending office visit None  Due in September    Medication can not be filled by protocol. Physician authorization required.     hypercholesterolemia 7/26/2018    Thyroid disease        Prior to Visit Medications    Medication Sig Taking? Authorizing Provider   venlafaxine (EFFEXOR XR) 37.5 MG extended release capsule Take 1 capsule by mouth daily Yes Sunday MD Carlito   levothyroxine (SYNTHROID) 50 MCG tablet Take 1 tablet by mouth Daily Yes Sunday MD Carlito   anastrozole (ARIMIDEX) 1 MG tablet Take 1 mg by mouth daily Yes Historical Provider, MD   Multiple Vitamins-Minerals (THERAPEUTIC MULTIVITAMIN-MINERALS) tablet Take 1 tablet by mouth daily Yes Historical Provider, MD   calcium carbonate 600 MG TABS tablet Take 1 tablet by mouth 2 times daily Yes Historical Provider, MD        No Known Allergies    Social History     Tobacco Use    Smoking status: Never Smoker    Smokeless tobacco: Never Used   Substance Use Topics    Alcohol use: Yes     Alcohol/week: 2.0 standard drinks     Types: 2 Glasses of wine per week           Vitals:    07/07/21 0838   BP: 132/84   Pulse: 71   Resp: 16   Temp: 97.1 °F (36.2 °C)   TempSrc: Temporal   SpO2: 96%   Weight: 156 lb (70.8 kg)   Height: 5' 5.5\" (1.664 m)     Estimated body mass index is 25.56 kg/m² as calculated from the following:    Height as of this encounter: 5' 5.5\" (1.664 m). Weight as of this encounter: 156 lb (70.8 kg). Physical Exam  Constitutional:       Appearance: She is well-developed. HENT:      Head: Normocephalic. Right Ear: Tympanic membrane normal.      Left Ear: Tympanic membrane normal.   Eyes:      Extraocular Movements: Extraocular movements intact. Conjunctiva/sclera: Conjunctivae normal.      Pupils: Pupils are equal, round, and reactive to light. Cardiovascular:      Rate and Rhythm: Normal rate and regular rhythm. Heart sounds: Normal heart sounds. No murmur heard. Pulmonary:      Effort: Pulmonary effort is normal.      Breath sounds: Normal breath sounds. No wheezing or rales.    Abdominal:      General: Bowel sounds are normal. Palpations: Abdomen is soft. Tenderness: There is no abdominal tenderness. Musculoskeletal:      Right lower leg: No edema. Left lower leg: No edema. Neurological:      Mental Status: She is alert. Comments: Cranial nerves grossly intact         ASSESSMENT/PLAN:  Lima Humphreys was seen today for hypothyroidism. Diagnoses and all orders for this visit:    Dysthymia  -     venlafaxine (EFFEXOR XR) 37.5 MG extended release capsule; Take 1 capsule by mouth daily  -     Comprehensive Metabolic Panel; Future  -     CBC Auto Differential; Future  Stable. No issues. No SI/HI. Tolerating Effexor well without side effects. Acquired hypothyroidism  -     TSH without Reflex; Future  -     T4, Free; Future  TSH appropriate as noted above. Continue current dosage of Synthroid. Pure hypercholesterolemia  -     Lipid Panel; Future  Elevated cholesterol. 6-month trial of lifestyle modifications advised. Flaxseed advised. Brazil nuts, 1 a day. Diverticulosis of large intestine without hemorrhage  Stable. No issues. Following with general surgery for flareups. Patient believes her last flareup was secondary to stress issues. Malignant neoplasm of left breast in female, estrogen receptor positive, unspecified site of breast [facility])  Patient is to follow-up with her specialist for discontinuation of her Arimidex later this year. Patient will need annual mammograms. DEXA scan due in the year 2022. Vitamin D deficiency  -     Vitamin D 25 Hydroxy; Future  Continue to follow. Not currently on supplementation. BMI 25.0-25.9,adult  Lifestyle modifications reviewed and advised. Return in about 6 months (around 1/7/2022) for To Discuss Labs Results, Physical.    An Primo1Dignature was used to authenticate this note.     --Micky Hanks MD on 7/7/21 at 8:04 AM EDT

## 2022-06-16 ENCOUNTER — HOSPITAL ENCOUNTER (OUTPATIENT)
Dept: GENERAL RADIOLOGY | Age: 57
Discharge: HOME | End: 2022-06-18
Payer: COMMERCIAL

## 2022-06-16 DIAGNOSIS — Z12.31 VISIT FOR SCREENING MAMMOGRAM: ICD-10-CM

## 2022-06-16 DIAGNOSIS — Z13.820 SCREENING FOR OSTEOPOROSIS: ICD-10-CM

## 2022-06-16 PROCEDURE — 77080 DXA BONE DENSITY AXIAL: CPT

## 2022-06-16 PROCEDURE — 77063 BREAST TOMOSYNTHESIS BI: CPT

## 2022-06-22 DIAGNOSIS — E03.9 ACQUIRED HYPOTHYROIDISM: ICD-10-CM

## 2022-06-22 DIAGNOSIS — E78.00 PURE HYPERCHOLESTEROLEMIA: ICD-10-CM

## 2022-06-22 DIAGNOSIS — E55.9 VITAMIN D DEFICIENCY: ICD-10-CM

## 2022-06-22 LAB
ALBUMIN SERPL-MCNC: 4.4 G/DL (ref 3.5–5.2)
ALP BLD-CCNC: 66 U/L (ref 35–104)
ALT SERPL-CCNC: 19 U/L (ref 0–32)
ANION GAP SERPL CALCULATED.3IONS-SCNC: 16 MMOL/L (ref 7–16)
AST SERPL-CCNC: 21 U/L (ref 0–31)
BASOPHILS ABSOLUTE: 0.04 E9/L (ref 0–0.2)
BASOPHILS RELATIVE PERCENT: 0.8 % (ref 0–2)
BILIRUB SERPL-MCNC: 0.5 MG/DL (ref 0–1.2)
BUN BLDV-MCNC: 16 MG/DL (ref 6–20)
CALCIUM SERPL-MCNC: 9.8 MG/DL (ref 8.6–10.2)
CHLORIDE BLD-SCNC: 106 MMOL/L (ref 98–107)
CHOLESTEROL, TOTAL: 237 MG/DL (ref 0–199)
CO2: 22 MMOL/L (ref 22–29)
CREAT SERPL-MCNC: 1 MG/DL (ref 0.5–1)
EOSINOPHILS ABSOLUTE: 0.14 E9/L (ref 0.05–0.5)
EOSINOPHILS RELATIVE PERCENT: 2.8 % (ref 0–6)
GFR AFRICAN AMERICAN: >60
GFR NON-AFRICAN AMERICAN: 57 ML/MIN/1.73
GLUCOSE BLD-MCNC: 91 MG/DL (ref 74–99)
HCT VFR BLD CALC: 41.7 % (ref 34–48)
HDLC SERPL-MCNC: 53 MG/DL
HEMOGLOBIN: 13.6 G/DL (ref 11.5–15.5)
IMMATURE GRANULOCYTES #: 0 E9/L
IMMATURE GRANULOCYTES %: 0 % (ref 0–5)
LDL CHOLESTEROL CALCULATED: 156 MG/DL (ref 0–99)
LYMPHOCYTES ABSOLUTE: 1.32 E9/L (ref 1.5–4)
LYMPHOCYTES RELATIVE PERCENT: 26.6 % (ref 20–42)
MCH RBC QN AUTO: 28.6 PG (ref 26–35)
MCHC RBC AUTO-ENTMCNC: 32.6 % (ref 32–34.5)
MCV RBC AUTO: 87.6 FL (ref 80–99.9)
MONOCYTES ABSOLUTE: 0.51 E9/L (ref 0.1–0.95)
MONOCYTES RELATIVE PERCENT: 10.3 % (ref 2–12)
NEUTROPHILS ABSOLUTE: 2.96 E9/L (ref 1.8–7.3)
NEUTROPHILS RELATIVE PERCENT: 59.5 % (ref 43–80)
PDW BLD-RTO: 14.2 FL (ref 11.5–15)
PLATELET # BLD: 275 E9/L (ref 130–450)
PMV BLD AUTO: 11.2 FL (ref 7–12)
POTASSIUM SERPL-SCNC: 5 MMOL/L (ref 3.5–5)
RBC # BLD: 4.76 E12/L (ref 3.5–5.5)
SODIUM BLD-SCNC: 144 MMOL/L (ref 132–146)
T4 FREE: 1.16 NG/DL (ref 0.93–1.7)
TOTAL PROTEIN: 6.9 G/DL (ref 6.4–8.3)
TRIGL SERPL-MCNC: 142 MG/DL (ref 0–149)
TSH SERPL DL<=0.05 MIU/L-ACNC: 3.16 UIU/ML (ref 0.27–4.2)
VITAMIN D 25-HYDROXY: 79 NG/ML (ref 30–100)
VLDLC SERPL CALC-MCNC: 28 MG/DL
WBC # BLD: 5 E9/L (ref 4.5–11.5)

## 2022-07-06 ENCOUNTER — OFFICE VISIT (OUTPATIENT)
Dept: PRIMARY CARE CLINIC | Age: 57
End: 2022-07-06
Payer: COMMERCIAL

## 2022-07-06 VITALS
BODY MASS INDEX: 24.99 KG/M2 | TEMPERATURE: 98.4 F | SYSTOLIC BLOOD PRESSURE: 132 MMHG | OXYGEN SATURATION: 96 % | HEART RATE: 78 BPM | WEIGHT: 150 LBS | HEIGHT: 65 IN | DIASTOLIC BLOOD PRESSURE: 80 MMHG

## 2022-07-06 DIAGNOSIS — F34.1 DYSTHYMIA: ICD-10-CM

## 2022-07-06 DIAGNOSIS — Z17.0 MALIGNANT NEOPLASM OF LEFT BREAST IN FEMALE, ESTROGEN RECEPTOR POSITIVE, UNSPECIFIED SITE OF BREAST (HCC): ICD-10-CM

## 2022-07-06 DIAGNOSIS — C50.912 MALIGNANT NEOPLASM OF LEFT BREAST IN FEMALE, ESTROGEN RECEPTOR POSITIVE, UNSPECIFIED SITE OF BREAST (HCC): ICD-10-CM

## 2022-07-06 DIAGNOSIS — E78.00 PURE HYPERCHOLESTEROLEMIA: ICD-10-CM

## 2022-07-06 DIAGNOSIS — E55.9 VITAMIN D DEFICIENCY: ICD-10-CM

## 2022-07-06 DIAGNOSIS — K57.30 DIVERTICULOSIS OF LARGE INTESTINE WITHOUT HEMORRHAGE: ICD-10-CM

## 2022-07-06 DIAGNOSIS — E03.9 ACQUIRED HYPOTHYROIDISM: Primary | ICD-10-CM

## 2022-07-06 PROCEDURE — G8427 DOCREV CUR MEDS BY ELIG CLIN: HCPCS | Performed by: FAMILY MEDICINE

## 2022-07-06 PROCEDURE — 1036F TOBACCO NON-USER: CPT | Performed by: FAMILY MEDICINE

## 2022-07-06 PROCEDURE — 3017F COLORECTAL CA SCREEN DOC REV: CPT | Performed by: FAMILY MEDICINE

## 2022-07-06 PROCEDURE — G8420 CALC BMI NORM PARAMETERS: HCPCS | Performed by: FAMILY MEDICINE

## 2022-07-06 PROCEDURE — 99214 OFFICE O/P EST MOD 30 MIN: CPT | Performed by: FAMILY MEDICINE

## 2022-07-06 RX ORDER — VENLAFAXINE HYDROCHLORIDE 37.5 MG/1
CAPSULE, EXTENDED RELEASE ORAL
Qty: 90 CAPSULE | Refills: 1 | Status: SHIPPED | OUTPATIENT
Start: 2022-08-01

## 2022-07-06 ASSESSMENT — PATIENT HEALTH QUESTIONNAIRE - PHQ9
3. TROUBLE FALLING OR STAYING ASLEEP: 0
SUM OF ALL RESPONSES TO PHQ QUESTIONS 1-9: 0
SUM OF ALL RESPONSES TO PHQ QUESTIONS 1-9: 0
4. FEELING TIRED OR HAVING LITTLE ENERGY: 0
5. POOR APPETITE OR OVEREATING: 0
SUM OF ALL RESPONSES TO PHQ9 QUESTIONS 1 & 2: 0
SUM OF ALL RESPONSES TO PHQ QUESTIONS 1-9: 0
9. THOUGHTS THAT YOU WOULD BE BETTER OFF DEAD, OR OF HURTING YOURSELF: 0
10. IF YOU CHECKED OFF ANY PROBLEMS, HOW DIFFICULT HAVE THESE PROBLEMS MADE IT FOR YOU TO DO YOUR WORK, TAKE CARE OF THINGS AT HOME, OR GET ALONG WITH OTHER PEOPLE: 0
6. FEELING BAD ABOUT YOURSELF - OR THAT YOU ARE A FAILURE OR HAVE LET YOURSELF OR YOUR FAMILY DOWN: 0
8. MOVING OR SPEAKING SO SLOWLY THAT OTHER PEOPLE COULD HAVE NOTICED. OR THE OPPOSITE, BEING SO FIGETY OR RESTLESS THAT YOU HAVE BEEN MOVING AROUND A LOT MORE THAN USUAL: 0
7. TROUBLE CONCENTRATING ON THINGS, SUCH AS READING THE NEWSPAPER OR WATCHING TELEVISION: 0
1. LITTLE INTEREST OR PLEASURE IN DOING THINGS: 0
SUM OF ALL RESPONSES TO PHQ QUESTIONS 1-9: 0
2. FEELING DOWN, DEPRESSED OR HOPELESS: 0

## 2022-07-06 ASSESSMENT — ENCOUNTER SYMPTOMS
VOMITING: 0
SORE THROAT: 0
CONSTIPATION: 0
RHINORRHEA: 0
WHEEZING: 0
NAUSEA: 0
SHORTNESS OF BREATH: 0
ABDOMINAL PAIN: 0
DIARRHEA: 0

## 2022-07-06 NOTE — PROGRESS NOTES
2022     Chief Complaint   Patient presents with    Follow-up     pt was on an antibiotice for tooth infection 4 weeks      HPI  Karen Galvez (:  1965) is a 59 y.o. female, here for evaluation of the following medical concerns:    Patient is a 78-year-old female with a past medical history of hypothyroidism, hyperlipidemia, vitamin D deficiency, dysthymia, malignant neoplasm of the left breast who completed therapy with anastrozole, and diverticulosis who presents today to follow-up.     Anxiety/depression: Patient on venlafaxine 37.5 mg ext release tablets well without any issue. Stable. No SI/HI.     Patient reports she is stable on Synthroid 50 µg daily without any issues.     Diverticulitis Flare: Recent exacerbation. Patient did see infectious disease due to concerns of possible abscess. Patient was advised to continue probiotic. And patient also advised to follow-up with her general surgeon. Recent tooth infection. Resolved. Plans for root canal specialist visit given infection in previous site.      History of malignant neoplasm of the breast: Patient completed her 10 year post cancer treatment regimen 2021. Patient will need annual mammograms. Completed DEXA in , awaiting results. .     Completed COVID-19 vaccine series.     Labs: CBC within normal limits. CMP within normal limits. TSH 3.1.  T4 1.1. Tot Chol.  237. Triglycerides 142. HDL 53. . Vitamin D 79. Review of Systems   Constitutional: Negative for chills and fever. HENT: Negative for congestion, rhinorrhea and sore throat. Respiratory: Negative for shortness of breath and wheezing. Cardiovascular: Negative for chest pain and leg swelling. Gastrointestinal: Negative for abdominal pain, constipation, diarrhea, nausea and vomiting. Skin: Negative for rash. Neurological: Negative for light-headedness and headaches.        Past Medical History:   Diagnosis Date    Anxiety     Blood transfusion     1981 / ectopic pregnacy    Breast cancer ([facility] [address] 2010    left breast, treated with surgery,radiation and chemo    Diverticulosis     History of therapeutic radiation     Hx antineoplastic chemo     Hypothyroidism     PONV (postoperative nausea and vomiting)     Pure hypercholesterolemia 7/26/2018    Thyroid disease        Prior to Visit Medications    Medication Sig Taking? Authorizing Provider   venlafaxine (EFFEXOR XR) 37.5 MG extended release capsule TAKE 1 CAPSULE BY MOUTH ONCE DAILY Yes Jacqueline Chery MD   levothyroxine (SYNTHROID) 50 MCG tablet TAKE ONE TABLET BY MOUTH Leandrew Craze Yes Jacqueline Chery MD   Multiple Vitamins-Minerals (THERAPEUTIC MULTIVITAMIN-MINERALS) tablet Take 1 tablet by mouth daily Yes Historical Provider, MD   calcium carbonate 600 MG TABS tablet Take 1 tablet by mouth 2 times daily Yes Historical Provider, MD        No Known Allergies    Social History     Tobacco Use    Smoking status: Never Smoker    Smokeless tobacco: Never Used   Substance Use Topics    Alcohol use: Yes     Alcohol/week: 2.0 standard drinks     Types: 2 Glasses of wine per week           Vitals:    07/06/22 0814   BP: 132/80   Pulse: 78   Temp: 98.4 °F (36.9 °C)   SpO2: 96%   Weight: 150 lb (68 kg)   Height: 5' 5\" (1.651 m)     Estimated body mass index is 24.96 kg/m² as calculated from the following:    Height as of this encounter: 5' 5\" (1.651 m). Weight as of this encounter: 150 lb (68 kg). Physical Exam  Constitutional:       Appearance: She is well-developed. HENT:      Head: Normocephalic. Eyes:      Extraocular Movements: Extraocular movements intact. Conjunctiva/sclera: Conjunctivae normal.   Cardiovascular:      Rate and Rhythm: Normal rate and regular rhythm. Heart sounds: Normal heart sounds. No murmur heard. Pulmonary:      Effort: Pulmonary effort is normal.      Breath sounds: Normal breath sounds. No wheezing or rales.    Abdominal: General: Bowel sounds are normal.      Palpations: Abdomen is soft. Tenderness: There is no abdominal tenderness. Musculoskeletal:      Right lower leg: No edema. Left lower leg: No edema. Neurological:      General: No focal deficit present. Mental Status: She is alert. Comments: Cranial nerves grossly intact   Psychiatric:         Mood and Affect: Mood normal.         Judgment: Judgment normal.         ASSESSMENT/PLAN:    Labs: CBC within normal limits. CMP within normal limits. TSH 3.1.  T4 1.1. Tot Chol.  237. Triglycerides 142. HDL 53. . Vitamin D 79. Susana Zamarripa was seen today for follow-up. Diagnoses and all orders for this visit:    Acquired hypothyroidism  -     TSH; Future  -     T4, Free; Future  Continue Synthroid 50 mcg daily. TSH appropriate. Dysthymia  -     venlafaxine (EFFEXOR XR) 37.5 MG extended release capsule; TAKE 1 CAPSULE BY MOUTH ONCE DAILY  -     CBC with Auto Differential; Future  -     Comprehensive Metabolic Panel; Future  Stable. No SI/HI. Tolerating above medication well without issues. Pure hypercholesterolemia  -     Lipid Panel; Future  Improved cholesterol. Congratulated patient on efforts. Continue to work on proper diet and exercise. Vitamin D deficiency  -     Vitamin D 25 Hydroxy; Future  Vitamin D appropriate. Continue over-the-counter supplementation  . Diverticulosis of large intestine without hemorrhage  Patient recently had a flareup of such. Patient was placed on antibiotics. Also saw infectious disease. Patient continue to follow with infectious disease and did. General surgery as an    Malignant neoplasm of left breast in female, estrogen receptor positive, unspecified site of breast [facility])  Following with annual mammograms. Has completed her post cancer therapy. BMI 25.0-25.9,adult  Lifestyle modifications advisable.       Return in about 6 months (around 1/6/2023) for Physical.    An electronicsignature was used to authenticate this note.     --Donna Negro MD on 7/6/22 at 8:09 AM EDT

## 2022-08-22 ENCOUNTER — PREP FOR PROCEDURE (OUTPATIENT)
Dept: SURGERY | Age: 57
End: 2022-08-22

## 2022-08-22 RX ORDER — SODIUM CHLORIDE 0.9 % (FLUSH) 0.9 %
5-40 SYRINGE (ML) INJECTION EVERY 12 HOURS SCHEDULED
Status: CANCELLED | OUTPATIENT
Start: 2022-08-22

## 2022-08-22 RX ORDER — SODIUM CHLORIDE 0.9 % (FLUSH) 0.9 %
5-40 SYRINGE (ML) INJECTION PRN
Status: CANCELLED | OUTPATIENT
Start: 2022-08-22

## 2022-08-22 RX ORDER — SODIUM CHLORIDE, SODIUM LACTATE, POTASSIUM CHLORIDE, CALCIUM CHLORIDE 600; 310; 30; 20 MG/100ML; MG/100ML; MG/100ML; MG/100ML
INJECTION, SOLUTION INTRAVENOUS CONTINUOUS
Status: CANCELLED | OUTPATIENT
Start: 2022-08-22

## 2022-08-22 RX ORDER — ENOXAPARIN SODIUM 100 MG/ML
40 INJECTION SUBCUTANEOUS
Status: CANCELLED | OUTPATIENT
Start: 2022-08-22 | End: 2022-08-22

## 2022-08-22 NOTE — H&P (VIEW-ONLY)
Name: Dawna Espinoza            : 1965 Sex: F  Age: 59 yrs  Acct#:  [id number]            CC: Routine follow-up    HPI: [Apparently, the patient had an episode of acute diverticulitis on . The patient states that the episode lasted for 4 days. The patient was not placed on any antibiotics. The symptoms resolved with conservative management. The patient states that her appetite and bowel habits are normal.  The patient states that she has had at least 5 episodes of acute diverticulitis. ]    Meds Prior to Visit:  Anastrozole  1 mg  1 by mouth every day  Citalopram Hydrobromide  10 mg   Cefdinir  300 mg  one by mouth twice a day x 10  Flagyl  500 mg  1 tab by mouth three times a day for 10 days  Augmentin  500-125 mg  one by mouth twice a day     Allergies:  NKDA    PMH:  Problem List: K57.20 - Dvtrcli of lg int w perforation and abscess w/o bleeding, Constipation, Diverticulitis of colon  Medical Problems:  anxiety,breast ca  Surgical Hx:  Breast  Cancer - () DR. LIN  Ectopic Pregnancy - () [address] [place] [address], no changes. FH:  Reviewed, no changes. SH:  Personal Habits:  Tobacco Use: Patient has never smoked. Alcohol: Current Alcohol Use; Social.Drug Use: Denies Drug Use. Daily Caffeine: Uses Caffeine. Reviewed, no changes. ROS:  Const: Reports anxiety and fatigue, but denies anorexia, night sweats, weight gain and weight loss. Eyes: Denies eye symptoms. ENMT: Denies ear symptoms. Denies nasal symptoms. Denies mouth or throat symptoms. CV: Denies hypertension and other cardiovascular symptoms. Resp: Denies respiratory symptoms. GI: Denies hepatitis, liver disease and other gastrointestinal symptoms. Musculo: Denies musculoskeletal symptoms. Skin: Denies skin, hair and nail symptoms. Breast: Denies breast problems. Neuro: Denies neurologic symptoms. Psych: Denies depression and substance abuse. Endocrine: Denies diabetes, kidney disease and thyroid disease.   Hema/Lymph: Denies

## 2022-08-22 NOTE — H&P
Name: Estefanía Helton            : 1965 Sex: F  Age: 59 yrs  Acct#:  [id number]            CC: Routine follow-up    HPI: [Apparently, the patient had an episode of acute diverticulitis on . The patient states that the episode lasted for 4 days. The patient was not placed on any antibiotics. The symptoms resolved with conservative management. The patient states that her appetite and bowel habits are normal.  The patient states that she has had at least 5 episodes of acute diverticulitis. ]    Meds Prior to Visit:  Anastrozole  1 mg  1 by mouth every day  Citalopram Hydrobromide  10 mg   Cefdinir  300 mg  one by mouth twice a day x 10  Flagyl  500 mg  1 tab by mouth three times a day for 10 days  Augmentin  500-125 mg  one by mouth twice a day     Allergies:  NKDA    PMH:  Problem List: K57.20 - Dvtrcli of lg int w perforation and abscess w/o bleeding, Constipation, Diverticulitis of colon  Medical Problems:  anxiety,breast ca  Surgical Hx:  Breast  Cancer - () DR. LIN  Ectopic Pregnancy - () [address] [place] [address], no changes. FH:  Reviewed, no changes. SH:  Personal Habits:  Tobacco Use: Patient has never smoked. Alcohol: Current Alcohol Use; Social.Drug Use: Denies Drug Use. Daily Caffeine: Uses Caffeine. Reviewed, no changes. ROS:  Const: Reports anxiety and fatigue, but denies anorexia, night sweats, weight gain and weight loss. Eyes: Denies eye symptoms. ENMT: Denies ear symptoms. Denies nasal symptoms. Denies mouth or throat symptoms. CV: Denies hypertension and other cardiovascular symptoms. Resp: Denies respiratory symptoms. GI: Denies hepatitis, liver disease and other gastrointestinal symptoms. Musculo: Denies musculoskeletal symptoms. Skin: Denies skin, hair and nail symptoms. Breast: Denies breast problems. Neuro: Denies neurologic symptoms. Psych: Denies depression and substance abuse. Endocrine: Denies diabetes, kidney disease and thyroid disease.   Hema/Lymph: Denies anemia, blood disease, cancer and past transfusion. Allergy/Immuno: Denies immunosuppression. Reviewed, no changes. Wt Prior: 150lb as of 04/21/22    Exam:    Neck is supple without adenopathy  Lungs are clear to auscultation  Cardiac regular rate and rhythm without murmurs rubs or gallops  Abdomen is soft, nondistended and nontender  Extremities without clubbing cyanosis or edema         Assessment #1: Hx K57.32 Diverticulitis of large intestine without perforation or abscess without bleeding   Care Plan:              Comments       : The patient will undergo robotic-assisted laparoscopic low anterior resection with possible colostomy and possible ileostomy. I have informed her of the risks, benefits and complications of the procedure and she is willing to proceed. High complexity    I performed an updated history, physical examination, assessment and plan.              Seen by:

## 2022-08-23 ENCOUNTER — HOSPITAL ENCOUNTER (OUTPATIENT)
Dept: PREADMISSION TESTING | Age: 57
Discharge: HOME | End: 2022-08-23
Payer: COMMERCIAL

## 2022-08-23 VITALS
WEIGHT: 149 LBS | RESPIRATION RATE: 20 BRPM | DIASTOLIC BLOOD PRESSURE: 60 MMHG | OXYGEN SATURATION: 100 % | HEIGHT: 65 IN | SYSTOLIC BLOOD PRESSURE: 127 MMHG | BODY MASS INDEX: 24.83 KG/M2 | HEART RATE: 62 BPM | TEMPERATURE: 97.8 F

## 2022-08-23 LAB
ABO/RH: NORMAL
ANTIBODY SCREEN: NORMAL
EKG ATRIAL RATE: 59 BPM
EKG P AXIS: 73 DEGREES
EKG P-R INTERVAL: 112 MS
EKG Q-T INTERVAL: 422 MS
EKG QRS DURATION: 80 MS
EKG QTC CALCULATION (BAZETT): 417 MS
EKG R AXIS: 77 DEGREES
EKG T AXIS: 78 DEGREES
EKG VENTRICULAR RATE: 59 BPM
HCT VFR BLD CALC: 42.8 % (ref 34–48)
HEMOGLOBIN: 13.8 G/DL (ref 11.5–15.5)
MCH RBC QN AUTO: 28.2 PG (ref 26–35)
MCHC RBC AUTO-ENTMCNC: 32.2 % (ref 32–34.5)
MCV RBC AUTO: 87.3 FL (ref 80–99.9)
PDW BLD-RTO: 13.3 FL (ref 11.5–15)
PLATELET # BLD: 240 E9/L (ref 130–450)
PMV BLD AUTO: 10.6 FL (ref 7–12)
RBC # BLD: 4.9 E12/L (ref 3.5–5.5)
WBC # BLD: 4.6 E9/L (ref 4.5–11.5)

## 2022-08-23 PROCEDURE — 93005 ELECTROCARDIOGRAM TRACING: CPT | Performed by: SURGERY

## 2022-08-23 PROCEDURE — 86900 BLOOD TYPING SEROLOGIC ABO: CPT

## 2022-08-23 PROCEDURE — 36415 COLL VENOUS BLD VENIPUNCTURE: CPT

## 2022-08-23 PROCEDURE — 86901 BLOOD TYPING SEROLOGIC RH(D): CPT

## 2022-08-23 PROCEDURE — 86850 RBC ANTIBODY SCREEN: CPT

## 2022-08-23 PROCEDURE — 85027 COMPLETE CBC AUTOMATED: CPT

## 2022-08-23 PROCEDURE — 87081 CULTURE SCREEN ONLY: CPT

## 2022-08-23 NOTE — PROGRESS NOTES
Consult for ostomy marking. ? Loop ileostomy  OR Friday 8/26 per Dr. Doug Kim  Patient independent. Hx abd surgery ectopic pregnancy with low abd incision  No significant folds or concerns. Bilateral lower quads marked.  can help with care and learning. Cruz Ladd.  Morena Nuñez, CNS, Wound Care

## 2022-08-23 NOTE — PROGRESS NOTES
[facility] PRE-ADMISSION TESTING INSTRUCTIONS    The Preadmission Testing patient is instructed accordingly using the following criteria (check applicable):    ARRIVAL INSTRUCTIONS:  [x] Parking the day of Surgery is located in the Main Entrance lot. Upon entering the door, make an immediate right to the surgery reception desk    [x] Bring photo ID and insurance card    [x] Bring in a copy of Living will or Durable Power of  papers. [x] Please be sure to arrange for responsible adult to provide transportation to and from the hospital    [x] Please arrange for responsible adult to be with you for the 24 hour period post procedure due to having anesthesia    [x] If you awake am of surgery not feeling well or have temperature >100 please call [phone]    GENERAL INSTRUCTIONS:    [x] Nothing by mouth after midnight, including gum, candy, mints or water    [x] You may brush your teeth, but do not swallow any water    [x] Take medications as instructed with 1-2 oz of water    [x] Stop herbal supplements and vitamins 5 days prior to procedure    [] Follow preop dosing of blood thinners per physician instructions    [] Take 1/2 dose of evening insulin, but no insulin after midnight    [] No oral diabetic medications after midnight    [] If diabetic and have low blood sugar or feel symptomatic, take 1-2oz apple juice only    [] Bring inhalers day of surgery    [] Bring C-PAP/ Bi-Pap day of surgery    [] Bring urine specimen day of surgery    [] Shower or bath with soap, lather and rinse well, AM of Surgery, no lotion, powders or creams to surgical site    [x] Follow bowel prep as instructed per surgeon    [x] No tobacco products within 24 hours of surgery     [x] No alcohol or illegal drug use within 24 hours of surgery.     [x] Jewelry, body piercing's, eyeglasses, contact lenses and dentures are not permitted into surgery (bring cases)      [x] Please do not wear any nail polish, make up or hair products on the day of surgery    [x] You can expect a call the business day prior to procedure to notify you if your arrival time changes    [x] If you receive a survey after surgery we would greatly appreciate your comments    [] Parent/guardian of a minor must accompany their child and remain on the premises  the entire time they are under our care     [] Pediatric patients may bring favorite toy, blanket or comfort item with them    [] A caregiver or family member must remain with the patient during their stay if they are mentally handicapped, have dementia, disoriented or unable to use a call light or would be a safety concern if left unattended    [x] Please notify surgeon if you develop any illness between now and time of surgery (cold, cough, sore throat, fever, nausea, vomiting) or any signs of infections  including skin, wounds, and dental.    [x]  The Outpatient Pharmacy is available to fill your prescription here on your day of surgery, ask your preop nurse for details    [x] Other instructions: wear loose, comfortable clothing    EDUCATIONAL MATERIALS PROVIDED:    [x] PAT Preoperative Education Packet/Booklet     [x] Medication List    [x] Transfusion bracelet applied with instructions    [x] Shower with soap, lather and rinse well, and use CHG wipes provided the evening before surgery as instructed    [x] Incentive spirometer with instructions

## 2022-08-24 LAB — MRSA CULTURE ONLY: NORMAL

## 2022-08-25 RX ORDER — SODIUM CHLORIDE 0.9 % (FLUSH) 0.9 %
5-40 SYRINGE (ML) INJECTION PRN
Status: CANCELLED | OUTPATIENT
Start: 2022-08-25

## 2022-08-25 RX ORDER — SODIUM CHLORIDE 9 MG/ML
INJECTION, SOLUTION INTRAVENOUS PRN
Status: CANCELLED | OUTPATIENT
Start: 2022-08-25

## 2022-08-25 RX ORDER — SODIUM CHLORIDE 0.9 % (FLUSH) 0.9 %
5-40 SYRINGE (ML) INJECTION EVERY 12 HOURS SCHEDULED
Status: CANCELLED | OUTPATIENT
Start: 2022-08-25

## 2022-08-26 ENCOUNTER — ANESTHESIA EVENT (OUTPATIENT)
Dept: OPERATING ROOM | Age: 57
DRG: 330 | End: 2022-08-26
Payer: COMMERCIAL

## 2022-08-26 ENCOUNTER — ANESTHESIA (OUTPATIENT)
Dept: OPERATING ROOM | Age: 57
DRG: 330 | End: 2022-08-26
Payer: COMMERCIAL

## 2022-08-26 ENCOUNTER — HOSPITAL ENCOUNTER (INPATIENT)
Age: 57
LOS: 4 days | Discharge: HOME | DRG: 330 | End: 2022-08-30
Attending: SURGERY | Admitting: SURGERY
Payer: COMMERCIAL

## 2022-08-26 DIAGNOSIS — K57.92 DIVERTICULITIS: ICD-10-CM

## 2022-08-26 DIAGNOSIS — Z90.49 HISTORY OF LOW ANTERIOR RESECTION OF RECTUM: Primary | ICD-10-CM

## 2022-08-26 PROBLEM — K57.20 DIVERTICULITIS OF LARGE INTESTINE WITH PERFORATION AND ABSCESS WITHOUT BLEEDING: Status: ACTIVE | Noted: 2022-08-26

## 2022-08-26 LAB
CREATININE FLUID: 0.7 MG/DL
FLUID TYPE: NORMAL

## 2022-08-26 PROCEDURE — 3700000000 HC ANESTHESIA ATTENDED CARE: Performed by: SURGERY

## 2022-08-26 PROCEDURE — 88305 TISSUE EXAM BY PATHOLOGIST: CPT

## 2022-08-26 PROCEDURE — 88307 TISSUE EXAM BY PATHOLOGIST: CPT

## 2022-08-26 PROCEDURE — 88304 TISSUE EXAM BY PATHOLOGIST: CPT

## 2022-08-26 PROCEDURE — 2580000003 HC RX 258: Performed by: SURGERY

## 2022-08-26 PROCEDURE — 2580000003 HC RX 258: Performed by: NURSE ANESTHETIST, CERTIFIED REGISTERED

## 2022-08-26 PROCEDURE — 88342 IMHCHEM/IMCYTCHM 1ST ANTB: CPT

## 2022-08-26 PROCEDURE — 2500000003 HC RX 250 WO HCPCS: Performed by: NURSE ANESTHETIST, CERTIFIED REGISTERED

## 2022-08-26 PROCEDURE — 88341 IMHCHEM/IMCYTCHM EA ADD ANTB: CPT

## 2022-08-26 PROCEDURE — 2709999900 HC NON-CHARGEABLE SUPPLY: Performed by: SURGERY

## 2022-08-26 PROCEDURE — C1758 CATHETER, URETERAL: HCPCS | Performed by: SURGERY

## 2022-08-26 PROCEDURE — 6370000000 HC RX 637 (ALT 250 FOR IP)

## 2022-08-26 PROCEDURE — 2720000010 HC SURG SUPPLY STERILE: Performed by: SURGERY

## 2022-08-26 PROCEDURE — 3700000001 HC ADD 15 MINUTES (ANESTHESIA): Performed by: SURGERY

## 2022-08-26 PROCEDURE — 2500000003 HC RX 250 WO HCPCS: Performed by: SURGERY

## 2022-08-26 PROCEDURE — 6360000002 HC RX W HCPCS: Performed by: SURGERY

## 2022-08-26 PROCEDURE — 8E0W4CZ ROBOTIC ASSISTED PROCEDURE OF TRUNK REGION, PERCUTANEOUS ENDOSCOPIC APPROACH: ICD-10-PCS | Performed by: STUDENT IN AN ORGANIZED HEALTH CARE EDUCATION/TRAINING PROGRAM

## 2022-08-26 PROCEDURE — 3600000019 HC SURGERY ROBOT ADDTL 15MIN: Performed by: SURGERY

## 2022-08-26 PROCEDURE — 0DBL4ZZ EXCISION OF TRANSVERSE COLON, PERCUTANEOUS ENDOSCOPIC APPROACH: ICD-10-PCS | Performed by: STUDENT IN AN ORGANIZED HEALTH CARE EDUCATION/TRAINING PROGRAM

## 2022-08-26 PROCEDURE — 6360000002 HC RX W HCPCS: Performed by: NURSE ANESTHETIST, CERTIFIED REGISTERED

## 2022-08-26 PROCEDURE — 0DNL0ZZ RELEASE TRANSVERSE COLON, OPEN APPROACH: ICD-10-PCS | Performed by: STUDENT IN AN ORGANIZED HEALTH CARE EDUCATION/TRAINING PROGRAM

## 2022-08-26 PROCEDURE — S2900 ROBOTIC SURGICAL SYSTEM: HCPCS | Performed by: SURGERY

## 2022-08-26 PROCEDURE — 82570 ASSAY OF URINE CREATININE: CPT

## 2022-08-26 PROCEDURE — 88112 CYTOPATH CELL ENHANCE TECH: CPT

## 2022-08-26 PROCEDURE — 7100000001 HC PACU RECOVERY - ADDTL 15 MIN: Performed by: SURGERY

## 2022-08-26 PROCEDURE — 1200000000 HC SEMI PRIVATE

## 2022-08-26 PROCEDURE — 3600000009 HC SURGERY ROBOT BASE: Performed by: SURGERY

## 2022-08-26 PROCEDURE — 7100000000 HC PACU RECOVERY - FIRST 15 MIN: Performed by: SURGERY

## 2022-08-26 PROCEDURE — 6370000000 HC RX 637 (ALT 250 FOR IP): Performed by: SURGERY

## 2022-08-26 RX ORDER — MEPERIDINE HYDROCHLORIDE 25 MG/ML
12.5 INJECTION INTRAMUSCULAR; INTRAVENOUS; SUBCUTANEOUS EVERY 5 MIN PRN
Status: DISCONTINUED | OUTPATIENT
Start: 2022-08-26 | End: 2022-08-26 | Stop reason: ALTCHOICE

## 2022-08-26 RX ORDER — PROCHLORPERAZINE EDISYLATE 5 MG/ML
5 INJECTION INTRAMUSCULAR; INTRAVENOUS
Status: ACTIVE | OUTPATIENT
Start: 2022-08-26 | End: 2022-08-26

## 2022-08-26 RX ORDER — ONDANSETRON 2 MG/ML
4 INJECTION INTRAMUSCULAR; INTRAVENOUS EVERY 6 HOURS PRN
Status: DISCONTINUED | OUTPATIENT
Start: 2022-08-26 | End: 2022-08-30 | Stop reason: HOSPADM

## 2022-08-26 RX ORDER — OXYCODONE HYDROCHLORIDE 5 MG/1
10 TABLET ORAL EVERY 4 HOURS PRN
Status: DISCONTINUED | OUTPATIENT
Start: 2022-08-26 | End: 2022-08-30 | Stop reason: HOSPADM

## 2022-08-26 RX ORDER — MIDAZOLAM HYDROCHLORIDE 2 MG/2ML
2 INJECTION, SOLUTION INTRAMUSCULAR; INTRAVENOUS
Status: ACTIVE | OUTPATIENT
Start: 2022-08-26 | End: 2022-08-26

## 2022-08-26 RX ORDER — SODIUM CHLORIDE 9 MG/ML
INJECTION, SOLUTION INTRAVENOUS PRN
Status: DISCONTINUED | OUTPATIENT
Start: 2022-08-26 | End: 2022-08-26

## 2022-08-26 RX ORDER — SODIUM CHLORIDE 0.9 % (FLUSH) 0.9 %
10 SYRINGE (ML) INJECTION EVERY 12 HOURS SCHEDULED
Status: DISCONTINUED | OUTPATIENT
Start: 2022-08-26 | End: 2022-08-30 | Stop reason: HOSPADM

## 2022-08-26 RX ORDER — SODIUM CHLORIDE, SODIUM LACTATE, POTASSIUM CHLORIDE, CALCIUM CHLORIDE 600; 310; 30; 20 MG/100ML; MG/100ML; MG/100ML; MG/100ML
INJECTION, SOLUTION INTRAVENOUS CONTINUOUS
Status: DISCONTINUED | OUTPATIENT
Start: 2022-08-26 | End: 2022-08-26

## 2022-08-26 RX ORDER — M-VIT,TX,IRON,MINS/CALC/FOLIC 27MG-0.4MG
1 TABLET ORAL DAILY
Status: DISCONTINUED | OUTPATIENT
Start: 2022-08-26 | End: 2022-08-30 | Stop reason: HOSPADM

## 2022-08-26 RX ORDER — SODIUM CHLORIDE, SODIUM LACTATE, POTASSIUM CHLORIDE, CALCIUM CHLORIDE 600; 310; 30; 20 MG/100ML; MG/100ML; MG/100ML; MG/100ML
INJECTION, SOLUTION INTRAVENOUS CONTINUOUS
Status: DISCONTINUED | OUTPATIENT
Start: 2022-08-26 | End: 2022-08-29

## 2022-08-26 RX ORDER — ONDANSETRON 2 MG/ML
4 INJECTION INTRAMUSCULAR; INTRAVENOUS
Status: ACTIVE | OUTPATIENT
Start: 2022-08-26 | End: 2022-08-26

## 2022-08-26 RX ORDER — CALCIUM CARBONATE 500(1250)
1 TABLET ORAL 2 TIMES DAILY
Status: DISCONTINUED | OUTPATIENT
Start: 2022-08-26 | End: 2022-08-30 | Stop reason: HOSPADM

## 2022-08-26 RX ORDER — SODIUM CHLORIDE 0.9 % (FLUSH) 0.9 %
5-40 SYRINGE (ML) INJECTION EVERY 12 HOURS SCHEDULED
Status: DISCONTINUED | OUTPATIENT
Start: 2022-08-26 | End: 2022-08-26

## 2022-08-26 RX ORDER — PROPOFOL 10 MG/ML
INJECTION, EMULSION INTRAVENOUS PRN
Status: DISCONTINUED | OUTPATIENT
Start: 2022-08-26 | End: 2022-08-26 | Stop reason: SDUPTHER

## 2022-08-26 RX ORDER — PHENYLEPHRINE HCL IN 0.9% NACL 1 MG/10 ML
SYRINGE (ML) INTRAVENOUS PRN
Status: DISCONTINUED | OUTPATIENT
Start: 2022-08-26 | End: 2022-08-26 | Stop reason: SDUPTHER

## 2022-08-26 RX ORDER — ONDANSETRON 2 MG/ML
INJECTION INTRAMUSCULAR; INTRAVENOUS PRN
Status: DISCONTINUED | OUTPATIENT
Start: 2022-08-26 | End: 2022-08-26 | Stop reason: SDUPTHER

## 2022-08-26 RX ORDER — ROCURONIUM BROMIDE 10 MG/ML
INJECTION, SOLUTION INTRAVENOUS PRN
Status: DISCONTINUED | OUTPATIENT
Start: 2022-08-26 | End: 2022-08-26 | Stop reason: SDUPTHER

## 2022-08-26 RX ORDER — SODIUM CHLORIDE 9 MG/ML
INJECTION, SOLUTION INTRAVENOUS PRN
Status: DISCONTINUED | OUTPATIENT
Start: 2022-08-26 | End: 2022-08-30 | Stop reason: HOSPADM

## 2022-08-26 RX ORDER — DIPHENHYDRAMINE HYDROCHLORIDE 50 MG/ML
12.5 INJECTION INTRAMUSCULAR; INTRAVENOUS
Status: ACTIVE | OUTPATIENT
Start: 2022-08-26 | End: 2022-08-26

## 2022-08-26 RX ORDER — KETOROLAC TROMETHAMINE 30 MG/ML
15 INJECTION, SOLUTION INTRAMUSCULAR; INTRAVENOUS
Status: ACTIVE | OUTPATIENT
Start: 2022-08-26 | End: 2022-08-26

## 2022-08-26 RX ORDER — ONDANSETRON 4 MG/1
4 TABLET, ORALLY DISINTEGRATING ORAL EVERY 8 HOURS PRN
Status: DISCONTINUED | OUTPATIENT
Start: 2022-08-26 | End: 2022-08-30 | Stop reason: HOSPADM

## 2022-08-26 RX ORDER — FENTANYL CITRATE 50 UG/ML
INJECTION, SOLUTION INTRAMUSCULAR; INTRAVENOUS PRN
Status: DISCONTINUED | OUTPATIENT
Start: 2022-08-26 | End: 2022-08-26 | Stop reason: SDUPTHER

## 2022-08-26 RX ORDER — ENOXAPARIN SODIUM 100 MG/ML
40 INJECTION SUBCUTANEOUS DAILY
Status: DISCONTINUED | OUTPATIENT
Start: 2022-08-27 | End: 2022-08-29

## 2022-08-26 RX ORDER — SODIUM CHLORIDE 9 MG/ML
INJECTION, SOLUTION INTRAVENOUS CONTINUOUS PRN
Status: DISCONTINUED | OUTPATIENT
Start: 2022-08-26 | End: 2022-08-26 | Stop reason: SDUPTHER

## 2022-08-26 RX ORDER — SCOLOPAMINE TRANSDERMAL SYSTEM 1 MG/1
PATCH, EXTENDED RELEASE TRANSDERMAL
Status: COMPLETED
Start: 2022-08-26 | End: 2022-08-26

## 2022-08-26 RX ORDER — LEVOTHYROXINE SODIUM 0.05 MG/1
50 TABLET ORAL DAILY
Status: DISCONTINUED | OUTPATIENT
Start: 2022-08-26 | End: 2022-08-30 | Stop reason: HOSPADM

## 2022-08-26 RX ORDER — MIDAZOLAM HYDROCHLORIDE 1 MG/ML
INJECTION INTRAMUSCULAR; INTRAVENOUS PRN
Status: DISCONTINUED | OUTPATIENT
Start: 2022-08-26 | End: 2022-08-26 | Stop reason: SDUPTHER

## 2022-08-26 RX ORDER — KETOROLAC TROMETHAMINE 30 MG/ML
INJECTION, SOLUTION INTRAMUSCULAR; INTRAVENOUS PRN
Status: DISCONTINUED | OUTPATIENT
Start: 2022-08-26 | End: 2022-08-26 | Stop reason: SDUPTHER

## 2022-08-26 RX ORDER — IPRATROPIUM BROMIDE AND ALBUTEROL SULFATE 2.5; .5 MG/3ML; MG/3ML
1 SOLUTION RESPIRATORY (INHALATION)
Status: DISPENSED | OUTPATIENT
Start: 2022-08-26 | End: 2022-08-26

## 2022-08-26 RX ORDER — SODIUM CHLORIDE 0.9 % (FLUSH) 0.9 %
5-40 SYRINGE (ML) INJECTION PRN
Status: DISCONTINUED | OUTPATIENT
Start: 2022-08-26 | End: 2022-08-26

## 2022-08-26 RX ORDER — VENLAFAXINE HYDROCHLORIDE 37.5 MG/1
37.5 CAPSULE, EXTENDED RELEASE ORAL
Status: DISCONTINUED | OUTPATIENT
Start: 2022-08-27 | End: 2022-08-30 | Stop reason: HOSPADM

## 2022-08-26 RX ORDER — EPHEDRINE SULFATE/0.9% NACL/PF 50 MG/5 ML
SYRINGE (ML) INTRAVENOUS PRN
Status: DISCONTINUED | OUTPATIENT
Start: 2022-08-26 | End: 2022-08-26 | Stop reason: SDUPTHER

## 2022-08-26 RX ORDER — MORPHINE SULFATE 2 MG/ML
2 INJECTION, SOLUTION INTRAMUSCULAR; INTRAVENOUS EVERY 5 MIN PRN
Status: DISCONTINUED | OUTPATIENT
Start: 2022-08-26 | End: 2022-08-27 | Stop reason: ALTCHOICE

## 2022-08-26 RX ORDER — PANTOPRAZOLE SODIUM 40 MG/1
40 TABLET, DELAYED RELEASE ORAL DAILY
Status: DISCONTINUED | OUTPATIENT
Start: 2022-08-26 | End: 2022-08-30 | Stop reason: HOSPADM

## 2022-08-26 RX ORDER — OXYCODONE HYDROCHLORIDE 5 MG/1
5 TABLET ORAL EVERY 4 HOURS PRN
Status: DISCONTINUED | OUTPATIENT
Start: 2022-08-26 | End: 2022-08-30 | Stop reason: HOSPADM

## 2022-08-26 RX ORDER — HYDRALAZINE HYDROCHLORIDE 20 MG/ML
10 INJECTION INTRAMUSCULAR; INTRAVENOUS
Status: DISCONTINUED | OUTPATIENT
Start: 2022-08-26 | End: 2022-08-30 | Stop reason: HOSPADM

## 2022-08-26 RX ORDER — HYDROMORPHONE HCL 110MG/55ML
PATIENT CONTROLLED ANALGESIA SYRINGE INTRAVENOUS PRN
Status: DISCONTINUED | OUTPATIENT
Start: 2022-08-26 | End: 2022-08-26 | Stop reason: SDUPTHER

## 2022-08-26 RX ORDER — SCOLOPAMINE TRANSDERMAL SYSTEM 1 MG/1
1 PATCH, EXTENDED RELEASE TRANSDERMAL
Status: ACTIVE | OUTPATIENT
Start: 2022-08-26 | End: 2022-08-29

## 2022-08-26 RX ORDER — ACETAMINOPHEN 325 MG/1
650 TABLET ORAL EVERY 6 HOURS
Status: DISCONTINUED | OUTPATIENT
Start: 2022-08-26 | End: 2022-08-30 | Stop reason: HOSPADM

## 2022-08-26 RX ORDER — SODIUM CHLORIDE 0.9 % (FLUSH) 0.9 %
10 SYRINGE (ML) INJECTION PRN
Status: DISCONTINUED | OUTPATIENT
Start: 2022-08-26 | End: 2022-08-30 | Stop reason: HOSPADM

## 2022-08-26 RX ORDER — DEXAMETHASONE SODIUM PHOSPHATE 4 MG/ML
INJECTION, SOLUTION INTRA-ARTICULAR; INTRALESIONAL; INTRAMUSCULAR; INTRAVENOUS; SOFT TISSUE PRN
Status: DISCONTINUED | OUTPATIENT
Start: 2022-08-26 | End: 2022-08-26 | Stop reason: SDUPTHER

## 2022-08-26 RX ORDER — ENOXAPARIN SODIUM 100 MG/ML
40 INJECTION SUBCUTANEOUS
Status: COMPLETED | OUTPATIENT
Start: 2022-08-26 | End: 2022-08-26

## 2022-08-26 RX ORDER — LABETALOL HYDROCHLORIDE 5 MG/ML
10 INJECTION, SOLUTION INTRAVENOUS
Status: DISCONTINUED | OUTPATIENT
Start: 2022-08-26 | End: 2022-08-30 | Stop reason: HOSPADM

## 2022-08-26 RX ORDER — NEOSTIGMINE METHYLSULFATE 1 MG/ML
INJECTION, SOLUTION INTRAVENOUS PRN
Status: DISCONTINUED | OUTPATIENT
Start: 2022-08-26 | End: 2022-08-26 | Stop reason: SDUPTHER

## 2022-08-26 RX ORDER — GLYCOPYRROLATE 0.2 MG/ML
INJECTION INTRAMUSCULAR; INTRAVENOUS PRN
Status: DISCONTINUED | OUTPATIENT
Start: 2022-08-26 | End: 2022-08-26 | Stop reason: SDUPTHER

## 2022-08-26 RX ADMIN — CEFOXITIN 2000 MG: 2 INJECTION, POWDER, FOR SOLUTION INTRAVENOUS at 09:35

## 2022-08-26 RX ADMIN — SODIUM CHLORIDE: 9 INJECTION, SOLUTION INTRAVENOUS at 09:20

## 2022-08-26 RX ADMIN — HYDROMORPHONE HYDROCHLORIDE 1 MG: 2 INJECTION, SOLUTION INTRAMUSCULAR; INTRAVENOUS; SUBCUTANEOUS at 10:45

## 2022-08-26 RX ADMIN — MIDAZOLAM 2 MG: 1 INJECTION INTRAMUSCULAR; INTRAVENOUS at 09:20

## 2022-08-26 RX ADMIN — Medication 100 MCG: at 12:07

## 2022-08-26 RX ADMIN — GLYCOPYRROLATE 0.6 MG: 0.2 INJECTION, SOLUTION INTRAMUSCULAR; INTRAVENOUS at 13:08

## 2022-08-26 RX ADMIN — PROPOFOL 150 MG: 10 INJECTION, EMULSION INTRAVENOUS at 09:29

## 2022-08-26 RX ADMIN — ROCURONIUM BROMIDE 10 MG: 10 INJECTION, SOLUTION INTRAVENOUS at 11:07

## 2022-08-26 RX ADMIN — ONDANSETRON 4 MG: 2 INJECTION INTRAMUSCULAR; INTRAVENOUS at 12:28

## 2022-08-26 RX ADMIN — Medication 3 MG: at 13:08

## 2022-08-26 RX ADMIN — Medication 10 MG: at 09:44

## 2022-08-26 RX ADMIN — PANTOPRAZOLE SODIUM 40 MG: 40 TABLET, DELAYED RELEASE ORAL at 16:40

## 2022-08-26 RX ADMIN — ROCURONIUM BROMIDE 10 MG: 10 INJECTION, SOLUTION INTRAVENOUS at 11:40

## 2022-08-26 RX ADMIN — HYDROMORPHONE HYDROCHLORIDE 0.5 MG: 2 INJECTION, SOLUTION INTRAMUSCULAR; INTRAVENOUS; SUBCUTANEOUS at 11:07

## 2022-08-26 RX ADMIN — HYDROMORPHONE HYDROCHLORIDE 0.5 MG: 1 INJECTION, SOLUTION INTRAMUSCULAR; INTRAVENOUS; SUBCUTANEOUS at 17:48

## 2022-08-26 RX ADMIN — Medication 200 MCG: at 09:47

## 2022-08-26 RX ADMIN — HYDROMORPHONE HYDROCHLORIDE 0.5 MG: 2 INJECTION, SOLUTION INTRAMUSCULAR; INTRAVENOUS; SUBCUTANEOUS at 13:03

## 2022-08-26 RX ADMIN — DEXAMETHASONE SODIUM PHOSPHATE 10 MG: 4 INJECTION, SOLUTION INTRAMUSCULAR; INTRAVENOUS at 09:50

## 2022-08-26 RX ADMIN — FENTANYL CITRATE 50 MCG: 50 INJECTION, SOLUTION INTRAMUSCULAR; INTRAVENOUS at 10:06

## 2022-08-26 RX ADMIN — KETOROLAC TROMETHAMINE 30 MG: 30 INJECTION, SOLUTION INTRAMUSCULAR; INTRAVENOUS at 13:05

## 2022-08-26 RX ADMIN — ACETAMINOPHEN 650 MG: 325 TABLET ORAL at 16:39

## 2022-08-26 RX ADMIN — OXYCODONE 5 MG: 5 TABLET ORAL at 16:40

## 2022-08-26 RX ADMIN — CALCIUM 500 MG: 500 TABLET ORAL at 20:53

## 2022-08-26 RX ADMIN — SODIUM CHLORIDE: 9 INJECTION, SOLUTION INTRAVENOUS at 10:16

## 2022-08-26 RX ADMIN — Medication 100 MCG: at 12:20

## 2022-08-26 RX ADMIN — CEFOXITIN SODIUM 2000 MG: 2 POWDER, FOR SOLUTION INTRAVENOUS at 17:23

## 2022-08-26 RX ADMIN — ROCURONIUM BROMIDE 50 MG: 10 INJECTION, SOLUTION INTRAVENOUS at 09:29

## 2022-08-26 RX ADMIN — OXYCODONE 10 MG: 5 TABLET ORAL at 20:53

## 2022-08-26 RX ADMIN — SODIUM CHLORIDE, POTASSIUM CHLORIDE, SODIUM LACTATE AND CALCIUM CHLORIDE: 600; 310; 30; 20 INJECTION, SOLUTION INTRAVENOUS at 16:44

## 2022-08-26 RX ADMIN — Medication 100 MCG: at 10:02

## 2022-08-26 RX ADMIN — SODIUM CHLORIDE: 9 INJECTION, SOLUTION INTRAVENOUS at 12:56

## 2022-08-26 RX ADMIN — ENOXAPARIN SODIUM 40 MG: 100 INJECTION SUBCUTANEOUS at 08:23

## 2022-08-26 RX ADMIN — FENTANYL CITRATE 100 MCG: 50 INJECTION, SOLUTION INTRAMUSCULAR; INTRAVENOUS at 09:29

## 2022-08-26 RX ADMIN — Medication 100 MCG: at 12:31

## 2022-08-26 ASSESSMENT — PAIN SCALES - GENERAL
PAINLEVEL_OUTOF10: 10
PAINLEVEL_OUTOF10: 4
PAINLEVEL_OUTOF10: 5
PAINLEVEL_OUTOF10: 7

## 2022-08-26 ASSESSMENT — LIFESTYLE VARIABLES
HOW OFTEN DO YOU HAVE A DRINK CONTAINING ALCOHOL: MONTHLY OR LESS
SMOKING_STATUS: 0

## 2022-08-26 ASSESSMENT — PAIN DESCRIPTION - LOCATION
LOCATION: ABDOMEN
LOCATION: ABDOMEN
LOCATION: GENERALIZED;ABDOMEN

## 2022-08-26 ASSESSMENT — PAIN - FUNCTIONAL ASSESSMENT
PAIN_FUNCTIONAL_ASSESSMENT: 0-10
PAIN_FUNCTIONAL_ASSESSMENT: ACTIVITIES ARE NOT PREVENTED
PAIN_FUNCTIONAL_ASSESSMENT: ACTIVITIES ARE NOT PREVENTED

## 2022-08-26 ASSESSMENT — PAIN DESCRIPTION - DESCRIPTORS
DESCRIPTORS: ACHING;TENDER;DISCOMFORT
DESCRIPTORS: ACHING
DESCRIPTORS: ACHING

## 2022-08-26 ASSESSMENT — PAIN DESCRIPTION - ORIENTATION
ORIENTATION: MID
ORIENTATION: MID

## 2022-08-26 NOTE — ANESTHESIA PRE PROCEDURE
Department of Anesthesiology  Preprocedure Note       Name:  Tyesha Basurto   Age:  59 y.o.  :  1965                                          MRN:  [id number]         Date:  2022      Surgeon: Bridget Clifton):  MD Swati Barahona MD    Procedure: Procedure(s):  LAPAROSCOPIC ROBOTIC XI ASSISTED LOW ANTERIOR COLON RESECTION POSSIBLE LOOP ILEOSTOMY POSSIBLE COLOSTOMY  BILATERAL CYSTOSCOPY URETERAL CATHETER INSERTION    Medications prior to admission:   Prior to Admission medications    Medication Sig Start Date End Date Taking?  Authorizing Provider   venlafaxine (EFFEXOR XR) 37.5 MG extended release capsule TAKE 1 CAPSULE BY MOUTH ONCE DAILY 22   Jack Murphy MD   levothyroxine (SYNTHROID) 50 MCG tablet TAKE ONE TABLET BY MOUTH EVERY DAY 3/31/22   Jack Murphy MD   Multiple Vitamins-Minerals (THERAPEUTIC MULTIVITAMIN-MINERALS) tablet Take 1 tablet by mouth daily    Historical Provider, MD   calcium carbonate 600 MG TABS tablet Take 1 tablet by mouth 2 times daily    Historical Provider, MD       Current medications:    Current Facility-Administered Medications   Medication Dose Route Frequency Provider Last Rate Last Admin    sodium chloride flush 0.9 % injection 5-40 mL  5-40 mL IntraVENous 2 times per day Vidhi Nam MD        sodium chloride flush 0.9 % injection 5-40 mL  5-40 mL IntraVENous PRN Vidhi Nam MD        lactated ringers infusion   IntraVENous Continuous Vidhi Nam MD        cefOXitin (MEFOXIN) 2000 mg in dextrose 5% 50 mL (mini-bag)  2,000 mg IntraVENous On Call to [place] [address] [address] Christian Novak MD        sodium chloride flush 0.9 % injection 5-40 mL  5-40 mL IntraVENous 2 times per day GABY Stratton CNP        sodium chloride flush 0.9 % injection 5-40 mL  5-40 mL IntraVENous PRN GABY Stratton CNP        0.9 % sodium chloride infusion   IntraVENous PRN GABY Stratton CNP           Allergies:  No Known Allergies    Problem List:    Patient Active Problem List   Diagnosis Code    Breast cancer ([facility] [address] C50.919    Myofascial muscle pain M79.18    Hypothyroidism E03.9    Diverticulosis of large intestine without hemorrhage K57.30    Pure hypercholesterolemia E78.00    Vitamin D deficiency E55.9    Dysthymia F34.1    Acute diverticulitis K57.92    Diverticulitis K57.92    Diverticulitis of large intestine with perforation and abscess without bleeding K57.20       Past Medical History:        Diagnosis Date    Anxiety     Blood transfusion     1981 / ectopic pregnacy    Breast cancer ([facility] [address] 2010    left breast, treated with surgery,radiation and chemo    Diverticulosis     History of blood transfusion     History of therapeutic radiation     Hx antineoplastic chemo     Hypothyroidism     PONV (postoperative nausea and vomiting)     Pure hypercholesterolemia 07/26/2018    Thyroid disease        Past Surgical History:        Procedure Laterality Date    BREAST LUMPECTOMY Left 2010    breast    COLONOSCOPY N/A 5/21/2020    COLONOSCOPY DIAGNOSTIC performed by Lorenza Rodriguez MD at [place] [address]  4/12/2022            Social History:    Social History     Tobacco Use    Smoking status: Never    Smokeless tobacco: Never   Substance Use Topics    Alcohol use:  Yes     Alcohol/week: 2.0 standard drinks     Types: 2 Glasses of wine per week     Comment: socially                                Counseling given: Not Answered      Vital Signs (Current):   Vitals:    08/26/22 0805   BP: (!) 142/67   Pulse: 62   Resp: 20   Temp: 97.2 °F (36.2 °C)   TempSrc: Temporal   SpO2: 99%   Weight: 149 lb (67.6 kg)   Height: 5' 5\" (1.651 m)                                              BP Readings from Last 3 Encounters:   08/26/22 (!) 142/67   08/23/22 127/60   07/06/22 132/80       NPO Status: Time of last liquid consumption: 0000 Time of last solid consumption: 1900                        Date of last liquid consumption: 08/25/22                        Date of last solid food consumption: 08/24/22    BMI:   Wt Readings from Last 3 Encounters:   08/26/22 149 lb (67.6 kg)   08/23/22 149 lb (67.6 kg)   07/06/22 150 lb (68 kg)     Body mass index is 24.79 kg/m². CBC:   Lab Results   Component Value Date/Time    WBC 4.6 08/23/2022 08:20 AM    RBC 4.90 08/23/2022 08:20 AM    HGB 13.8 08/23/2022 08:20 AM    HCT 42.8 08/23/2022 08:20 AM    MCV 87.3 08/23/2022 08:20 AM    RDW 13.3 08/23/2022 08:20 AM     08/23/2022 08:20 AM       CMP:   Lab Results   Component Value Date/Time     06/22/2022 08:40 AM    K 5.0 06/22/2022 08:40 AM    K 3.9 04/07/2022 06:00 PM     06/22/2022 08:40 AM    CO2 22 06/22/2022 08:40 AM    BUN 16 06/22/2022 08:40 AM    CREATININE 1.0 06/22/2022 08:40 AM    GFRAA >60 06/22/2022 08:40 AM    LABGLOM 57 06/22/2022 08:40 AM    GLUCOSE 91 06/22/2022 08:40 AM    GLUCOSE 79 12/22/2010 07:19 AM    PROT 6.9 06/22/2022 08:40 AM    CALCIUM 9.8 06/22/2022 08:40 AM    BILITOT 0.5 06/22/2022 08:40 AM    ALKPHOS 66 06/22/2022 08:40 AM    AST 21 06/22/2022 08:40 AM    ALT 19 06/22/2022 08:40 AM       POC Tests: No results for input(s): POCGLU, POCNA, POCK, POCCL, POCBUN, POCHEMO, POCHCT in the last 72 hours.     Coags:   Lab Results   Component Value Date/Time    PROTIME 11.3 04/13/2022 05:40 AM    INR 1.0 04/13/2022 05:40 AM       HCG (If Applicable): No results found for: PREGTESTUR, PREGSERUM, HCG, HCGQUANT     ABGs: No results found for: PHART, PO2ART, FVG4XZV, WKD2TBN, BEART, H2UCOPTY     Type & Screen (If Applicable):  No results found for: LABABO, LABRH    Drug/Infectious Status (If Applicable):  No results found for: HIV, HEPCAB    COVID-19 Screening (If Applicable):   Lab Results   Component Value Date/Time    COVID19 Not Detected 05/16/2020 12:51 PM         Anesthesia Evaluation  Patient summary reviewed and Nursing notes reviewed   history of anesthetic complications: PONV. Airway: Mallampati: II  TM distance: >3 FB   Neck ROM: full  Mouth opening: > = 3 FB   Dental: normal exam         Pulmonary:Negative Pulmonary ROS breath sounds clear to auscultation      (-) not a current smoker                           Cardiovascular:  Exercise tolerance: good (>4 METS),   (+) hyperlipidemia        Rhythm: regular  Rate: normal                    Neuro/Psych:   (+) psychiatric history:depression/anxiety             GI/Hepatic/Renal:        (-) no morbid obesity      ROS comment: Diverticulitis. Endo/Other:    (+) hypothyroidism::., malignancy/cancer ( Breast cancer ). ROS comment: Vitamin D deficiency Abdominal:             Vascular: negative vascular ROS. Other Findings:             Anesthesia Plan      general     ASA 3       Induction: intravenous. MIPS: Postoperative opioids intended and Prophylactic antiemetics administered. Anesthetic plan and risks discussed with patient and spouse. Plan discussed with CRNA and surgical team.            DOS STAFF ADDENDUM:    Patient seen and examined, physical exam updated as needed, chart reviewed. NPO status confirmed. Anesthetic options and risks discussed with patient/legal guardian. Patient/legal guardian verbalized understanding and agrees to proceed. GABY De La Cruz - CRNA  Staff CRNA  August 26, 2022  10:04 AM                    DOS STAFF ADDENDUM:    Pt seen and examined, chart reviewed (including anesthesia, drug and allergy history). Anesthetic plan, risks, benefits, alternatives, and personnel involved discussed with patient. Patient verbalized an understanding and agrees to proceed. Plan discussed with care team members and agreed upon.     Sadie South MD  Staff Anesthesiologist  9:03 AM    Sadie South MD   8/26/2022

## 2022-08-26 NOTE — OP NOTE
Operative Note      Patient: Leland German  YOB: 1965  MRN: [id number]    Date of Procedure: 8/26/2022    Pre-Op Diagnosis: Diverticulitis [K57.92]    Post-Op Diagnosis: Same           Surgeon(s):    Meryl Flores MD    Assistant:   Resident: Chandler Bhagat MD    Anesthesia: General    Estimated Blood Loss (mL): Minimal    Complications: None    Specimens:   * No specimens in log *    Implants:  * No implants in log *      Drains: * No LDAs found *    Findings: see op note    Detailed Description of Procedure:     Patient a 61-year-old female going undergoing low anterior resection for diverticulitis we were asked to place preoperative stents and ICG high risk medicine alternatives were discussed with patient informed consent was obtained and signed    Operation:    Patient was wheeled back in the operative suite. Upon entering the operative suite the patient was identified using the [employer] number. The patient was transferred the operative table and placed in supine position. Anesthesia was delivered without any complication a surgical timeout was taken on the agreement. 25 [place] cystoscope was inserted urethra atraumatically and the urinary bladder full cystoscopic examination the bladder did reveal any stones lesions tumors or abnormalities right and left ureteral orifice were normal anatomic position. 5 [place] open-ended catheter was placed in the left and the right ureteral orifice and was taken up to 30 cm. 4 cc of ICG green was then instilled into both catheters. Then flushed with 4 cc of saline. These catheters were then remained the cystoscope was removed Best catheter was placed this was left to drainage the patient was then turned over to Dr. Henrique Joshi for rest of her surgery.     Electronically signed by Meryl Flores MD on 8/26/2022 at 10:21 AM

## 2022-08-26 NOTE — OP NOTE
Operative Note      Patient: Mara Borden  YOB: 1965  MRN: [id number]    Date of Procedure: 8/26/2022    Pre-Op Diagnosis: Diverticulitis [K57.92]    Post-Op Diagnosis: Same       Procedure(s):  Laparoscopic robotic XI assisted low anterior colon resection  Lysis of adhesions for greater than 1 hour  Cystoscopy with bilateral ureteral stents (dictated separately by urology)    Surgeon(s):  MD Melissa Piña MD    Assistant:   Resident: Jimmy Carrero MD    Anesthesia: General    Estimated Blood Loss (mL): less than 456     Complications: None    Specimens:   ID Type Source Tests Collected by Time Destination   A : Pelvic fluid  Body Fluid Abdomen CYTOLOGY, NON-GYN, CREATININE, BODY FLUID Tiffanie Leslie MD 8/26/2022 1039    B : SIGMOID COLON AND PROXIMAL RECTUM Tissue Tissue SURGICAL PATHOLOGY Tiffanie Leslie MD 8/26/2022 1224    C : PROXIMAL DONUT Tissue Tissue SURGICAL PATHOLOGY Tiffanie Leslie MD 8/26/2022 1250    D : DISTAL DONUT Tissue Tissue SURGICAL PATHOLOGY Tiffanie Leslie MD 8/26/2022 1254        Implants:  * No implants in log *      Drains:   Urinary Catheter 08/26/22 Best (Active)       Findings: Dense inflammatory adhesions of colon to uterus, left fallopian tube. Detailed Description of Procedure:     BRIEF HISTORY: The patient presented with a history of diverticulitis. I discussed laparoscopic robotic assisted low anterior resection with bilateral ureteral stent placement with the patient including the procedure, benefits, risks and alternatives, and the patient agreed. PROCEDURE IN DETAIL:   The patient was brought to the operating room and positioned supine on the OR table. Sequential compression devices were placed on the patient's lower extremities and functioning. Preoperative antibiotics were administered. Anesthesia was obtained without complication as per the anesthesia record.   The patient was then placed in the lithotomy position and bilateral ureteral stents were placed and dictated separately by urology. Immediately prior to the procedure a time-out was called and the surgical checklist was reviewed and agreed upon by all present. The patient was prepped and draped in the usual sterile fashion. A stab incision was made in the left upper quadrant at Leos's point and a Veress needle was used to gain peritoneal access without difficulty. The abdomen was insufflated to 15 mmHg. The abdomen was marked for planned port placement. An 11 blade was used to make an 8 mm supraumbilical incision and a robotic 8 mm trocar was inserted. Camera was inserted and her Veress needle insertion site was identified without signs of trauma to the underlying tissue. We then placed one 8 mm robotic trocar at our Veress needle insertion site followed by 2 more right sided robotic trochars diagonally to the right lower quadrant under direct visualization. Patient was placed in steep Trendelenburg position and tilted to the right. The robot was brought up and docked and instruments were inserted under direct visualization. The small bowel was retracted out of the pelvis. Colon was evaluated as noted to be dense adhesions within the pelvis. We identified an area of colon distal to the SUZI that appeared healthy without inflammation just superior to the pelvic inlet. Due to the patient's small body habitus our ureteral stents were easily visualized. We then took down our lateral peritoneal attachments of her sigmoid colon care was taken to not injure the ureter or iliac vessels. Next an incision was made in the mesentery near her planned proximal division site. A plane was created between the mesentery and the retroperitoneal tissue using combination of blunt and sharp dissection. The left ureter was identified and care was taken to avoid injuring it. A robotic blue load 60 mm stapler was used certified the sigmoid colon had her proximal division point.  We then took down the white line of Toldt from caudal to cephalad medializing the descending colon up to the splenic flexure. Next the descending colon mesentery was divided using a vessel sealer care was taken to keep the blood supply to the descending colon and her dissection was performed distal to the arc of Riolan. Next the colon was mobilized using a vessel sealer taking down the mesorectum using electrocautery. During this dissection there was dense adhesions of the colon to the fallopian tubes and uterus these were taken down with a combination of blunt and sharp dissection hemostasis was obtained using a vessel sealer. During this dissection care was taken to avoid injuring the ureter or iliac vessels. IC-Green was used to confirm ureteral location. The mesorectum was divided off of the rectum at the location of our distal division. Next a robotic blue load stapler was used to divide the rectum at this point. The robotic instruments were removed under direct visualization the robotic arms review attached from the trocar and the robot was removed from the patient. A lower midline incision was made using a 15 blade and electrocautery was used to dissect through the subcutaneous tissue and fascia. Specimen was placed in a laparoscopic retrieval bag and removed through our lower midline incision. The distal end of the descending colon was externalized. A pursestring device was used to place a pursestring around the descending colon, the staple line was divided and the anvil was inserted into the descending colon, next the pursestring was sutured down in place. The anvil and descending colon were returned to the abdominal cavity. An 0 loop PDS was used to close the midline fascia, and the abdomen was reinsufflated. The rectum was dilated to 29 mm. The EEA stapler was introduced through the anus. The spike was introduced anterior to the staple line.   The anvil on the descending colon was attached to the EEA stapler and the EEA stapler was closed and fired, creating her colorectal anastomosis. 2 complete donuts were obtained from the stapling mechanism. Next the abdomen was filled with sterile saline and a leak test was performed using bulb syringe. No leak was identified. Hemostasis was ensured, the right lower quadrant 12 mm port site was closed using a Dioni-Priyanka device under direct visualization. The abdomen was desufflated and robotic trocar sites were closed using 4-0 Vicryl suture. The midline incision was closed in 2 layers using 3-0 Vicryl interrupted followed by 4-0 subcuticular Vicryl suture. All incisions were covered with skin glue. Bilateral ureteral stents were removed at the end the case noted be fully intact. Needle, sponge, and instrument counts were reported as correct x2. The patient tolerated the procedure well without complications and was transferred to the recovery area in good condition. Dr. Aline Mejia was present and scrubbed throughout the case.     Electronically signed by Meme Hoffman MD on 8/26/2022 at 1:32 PM

## 2022-08-26 NOTE — H&P
Amparo Adams is a 59 y.o. female with diverticulitis here for LAR. I was asked to place bilateral ureteral stents. All r/b/a were discused with the patient and her     Past Medical History:   Diagnosis Date    Anxiety     Blood transfusion     1981 / ectopic pregnacy    Breast cancer ([address] 2010    left breast, treated with surgery,radiation and chemo    Diverticulosis     History of blood transfusion     History of therapeutic radiation     Hx antineoplastic chemo     Hypothyroidism     PONV (postoperative nausea and vomiting)     Pure hypercholesterolemia 07/26/2018    Thyroid disease        Past Surgical History:   Procedure Laterality Date    BREAST LUMPECTOMY Left 2010    breast    COLONOSCOPY N/A 5/21/2020    COLONOSCOPY DIAGNOSTIC performed by Allie Rodarte MD at [place] [address]  4/12/2022            Family History   Problem Relation Age of Onset    Breast Cancer Mother     Heart Attack Mother     Other Father         skin cancer    Breast Cancer Paternal Grandmother     Scoliosis Daughter     Heart Disease Sister        Prior to Admission medications    Medication Sig Start Date End Date Taking? Authorizing Provider   venlafaxine (EFFEXOR XR) 37.5 MG extended release capsule TAKE 1 CAPSULE BY MOUTH ONCE DAILY 8/1/22   Roz Cody MD   levothyroxine (SYNTHROID) 50 MCG tablet TAKE ONE TABLET BY MOUTH EVERY DAY 3/31/22   Roz Cody MD   Multiple Vitamins-Minerals (THERAPEUTIC MULTIVITAMIN-MINERALS) tablet Take 1 tablet by mouth daily    Historical Provider, MD   calcium carbonate 600 MG TABS tablet Take 1 tablet by mouth 2 times daily    Historical Provider, MD        Allergies: Patient has no known allergies. Social History     Tobacco Use    Smoking status: Never    Smokeless tobacco: Never   Substance Use Topics    Alcohol use:  Yes     Alcohol/week: 2.0 standard drinks     Types: 2 Glasses of wine per week Comment: socially        Review of Systems:  Respiratory: negative for cough and hemoptysis  Cardiovascular: negative for chest pain and dyspnea  Gastrointestinal: negative for abdominal pain, diarrhea, nausea and vomiting  Genitourinary: as per HPI, otherwise negative. Derm: negative for rash and skin lesion(s)  Neurological: negative for seizures and tremors  Endocrine: negative for diabetic symptoms including polydipsia and polyuria  All other systems negative    Physical Exam:  Vitals:    08/26/22 0805   BP: (!) 142/67   Pulse: 62   Resp: 20   Temp: 97.2 °F (36.2 °C)   SpO2: 99%      Skin:  Warm and dry. No rash or bruises  HEENT:  PERRLA, EOMI  Neck:  No JVD, No thyromegaly  Cardiac:  RRR  Lungs:  No audible wheezes, symmetric respirations, non-labored  Abdomen: Soft, non-tender, non-distended  Extremities:  No clubbing, edema or cyanosis  Neurological:  Moves all extremities, normal DTR    Lab Results   Component Value Date    WBC 4.6 08/23/2022    HGB 13.8 08/23/2022    HCT 42.8 08/23/2022    MCV 87.3 08/23/2022     08/23/2022       Lab Results   Component Value Date    CREATININE 1.0 06/22/2022       No results found for: PSA    No results for input(s): LABURIN in the last 72 hours. No results for input(s): BC in the last 72 hours. No results for input(s): Gerhardt Hones in the last 72 hours. Assessment and Plan:  1. Cystoscopy, bilateral ureteral stent placement.

## 2022-08-26 NOTE — PROGRESS NOTES
1258 Pt stated she was marked for OR but the markings have faded, [employer] form wound care recalled to yesenia pt

## 2022-08-26 NOTE — PROGRESS NOTES
Nurse to nurse called to [phone]. All questions answered.  Patient is in stable condition and ready for transport to room 619

## 2022-08-26 NOTE — ANESTHESIA POSTPROCEDURE EVALUATION
Department of Anesthesiology  Postprocedure Note    Patient: Elia Beckford  MRN: [id number]  YOB: 1965  Date of evaluation: 8/26/2022      Procedure Summary     Date: 08/26/22 Room / Location: [facility] OR 10 / [facility]    Anesthesia Start: 5868 Anesthesia Stop: 1329    Procedures:       LAPAROSCOPIC ROBOTIC XI ASSISTED LOW ANTERIOR COLON RESECTION      CYSTOSCOPY, BILATERAL URETERAL CATHETER INSERTION (Bilateral: Ureter) Diagnosis:       Diverticulitis      (Diverticulitis [K57.92])    Surgeons: Sangeeta Vaz MD; Davida Nam MD Responsible Provider: Sajan Mireles MD    Anesthesia Type: General ASA Status: 3          Anesthesia Type: General    Austin Phase I: Austin Score: 10    Austin Phase II:        Anesthesia Post Evaluation    Patient location during evaluation: PACU  Patient participation: complete - patient participated  Level of consciousness: sleepy but conscious  Pain score: 0  Airway patency: patent  Nausea & Vomiting: no nausea and no vomiting  Complications: no  Cardiovascular status: blood pressure returned to baseline  Respiratory status: acceptable  Hydration status: euvolemic

## 2022-08-26 NOTE — PROGRESS NOTES
Database initiated pharmacy and medications verified with the patient and her . She is A&O from home uses no assistive devices and is RA at baseline.

## 2022-08-26 NOTE — PROGRESS NOTES
Seen in pre-op. Was marked in PAT. Went swimming and markings washed off. Bilateral lower quadrants marked, no areas of concern noted.  present. Will follow as needed  Catina Begum.  Justa Mora, CNS, Wound Care

## 2022-08-27 LAB
ANION GAP SERPL CALCULATED.3IONS-SCNC: 6 MMOL/L (ref 7–16)
BASOPHILS ABSOLUTE: 0 E9/L (ref 0–0.2)
BASOPHILS RELATIVE PERCENT: 0 % (ref 0–2)
BUN BLDV-MCNC: 13 MG/DL (ref 6–20)
CALCIUM SERPL-MCNC: 8.3 MG/DL (ref 8.6–10.2)
CHLORIDE BLD-SCNC: 107 MMOL/L (ref 98–107)
CO2: 25 MMOL/L (ref 22–29)
CREAT SERPL-MCNC: 0.9 MG/DL (ref 0.5–1)
EOSINOPHILS ABSOLUTE: 0 E9/L (ref 0.05–0.5)
EOSINOPHILS RELATIVE PERCENT: 0 % (ref 0–6)
GFR AFRICAN AMERICAN: >60
GFR NON-AFRICAN AMERICAN: >60 ML/MIN/1.73
GLUCOSE BLD-MCNC: 129 MG/DL (ref 74–99)
HCT VFR BLD CALC: 35.4 % (ref 34–48)
HEMOGLOBIN: 11.3 G/DL (ref 11.5–15.5)
LYMPHOCYTES ABSOLUTE: 0.22 E9/L (ref 1.5–4)
LYMPHOCYTES RELATIVE PERCENT: 1.7 % (ref 20–42)
MCH RBC QN AUTO: 28.5 PG (ref 26–35)
MCHC RBC AUTO-ENTMCNC: 31.9 % (ref 32–34.5)
MCV RBC AUTO: 89.2 FL (ref 80–99.9)
MONOCYTES ABSOLUTE: 0.33 E9/L (ref 0.1–0.95)
MONOCYTES RELATIVE PERCENT: 2.6 % (ref 2–12)
NEUTROPHILS ABSOLUTE: 10.66 E9/L (ref 1.8–7.3)
NEUTROPHILS RELATIVE PERCENT: 95.7 % (ref 43–80)
NUCLEATED RED BLOOD CELLS: 0 /100 WBC
OVALOCYTES: ABNORMAL
PDW BLD-RTO: 13.7 FL (ref 11.5–15)
PLATELET # BLD: 211 E9/L (ref 130–450)
PMV BLD AUTO: 10.7 FL (ref 7–12)
POIKILOCYTES: ABNORMAL
POTASSIUM REFLEX MAGNESIUM: 5 MMOL/L (ref 3.5–5)
RBC # BLD: 3.97 E12/L (ref 3.5–5.5)
SODIUM BLD-SCNC: 138 MMOL/L (ref 132–146)
WBC # BLD: 11.1 E9/L (ref 4.5–11.5)

## 2022-08-27 PROCEDURE — 6360000002 HC RX W HCPCS: Performed by: SURGERY

## 2022-08-27 PROCEDURE — 1200000000 HC SEMI PRIVATE

## 2022-08-27 PROCEDURE — 2580000003 HC RX 258: Performed by: SURGERY

## 2022-08-27 PROCEDURE — 2700000000 HC OXYGEN THERAPY PER DAY

## 2022-08-27 PROCEDURE — 85025 COMPLETE CBC W/AUTO DIFF WBC: CPT

## 2022-08-27 PROCEDURE — 36415 COLL VENOUS BLD VENIPUNCTURE: CPT

## 2022-08-27 PROCEDURE — 6370000000 HC RX 637 (ALT 250 FOR IP): Performed by: SURGERY

## 2022-08-27 PROCEDURE — 80048 BASIC METABOLIC PNL TOTAL CA: CPT

## 2022-08-27 RX ADMIN — LEVOTHYROXINE SODIUM 50 MCG: 0.05 TABLET ORAL at 10:13

## 2022-08-27 RX ADMIN — PANTOPRAZOLE SODIUM 40 MG: 40 TABLET, DELAYED RELEASE ORAL at 10:13

## 2022-08-27 RX ADMIN — MULTIPLE VITAMINS W/ MINERALS TAB 1 TABLET: TAB at 10:11

## 2022-08-27 RX ADMIN — SODIUM CHLORIDE, PRESERVATIVE FREE 10 ML: 5 INJECTION INTRAVENOUS at 10:17

## 2022-08-27 RX ADMIN — ACETAMINOPHEN 650 MG: 325 TABLET ORAL at 16:40

## 2022-08-27 RX ADMIN — SODIUM CHLORIDE, POTASSIUM CHLORIDE, SODIUM LACTATE AND CALCIUM CHLORIDE: 600; 310; 30; 20 INJECTION, SOLUTION INTRAVENOUS at 15:32

## 2022-08-27 RX ADMIN — CALCIUM 500 MG: 500 TABLET ORAL at 10:12

## 2022-08-27 RX ADMIN — CEFOXITIN SODIUM 2000 MG: 2 POWDER, FOR SOLUTION INTRAVENOUS at 06:15

## 2022-08-27 RX ADMIN — CEFOXITIN SODIUM 2000 MG: 2 POWDER, FOR SOLUTION INTRAVENOUS at 15:30

## 2022-08-27 RX ADMIN — ENOXAPARIN SODIUM 40 MG: 100 INJECTION SUBCUTANEOUS at 10:13

## 2022-08-27 RX ADMIN — ACETAMINOPHEN 650 MG: 325 TABLET ORAL at 10:13

## 2022-08-27 RX ADMIN — OXYCODONE 5 MG: 5 TABLET ORAL at 10:26

## 2022-08-27 RX ADMIN — CALCIUM 500 MG: 500 TABLET ORAL at 21:07

## 2022-08-27 RX ADMIN — SODIUM CHLORIDE, PRESERVATIVE FREE 10 ML: 5 INJECTION INTRAVENOUS at 21:07

## 2022-08-27 RX ADMIN — ACETAMINOPHEN 650 MG: 325 TABLET ORAL at 21:06

## 2022-08-27 RX ADMIN — VENLAFAXINE HYDROCHLORIDE 37.5 MG: 37.5 CAPSULE, EXTENDED RELEASE ORAL at 10:11

## 2022-08-27 RX ADMIN — OXYCODONE 5 MG: 5 TABLET ORAL at 15:38

## 2022-08-27 ASSESSMENT — PAIN DESCRIPTION - ONSET: ONSET: ON-GOING

## 2022-08-27 ASSESSMENT — PAIN DESCRIPTION - FREQUENCY: FREQUENCY: INTERMITTENT

## 2022-08-27 ASSESSMENT — PAIN SCALES - GENERAL: PAINLEVEL_OUTOF10: 4

## 2022-08-27 ASSESSMENT — PAIN DESCRIPTION - LOCATION: LOCATION: ABDOMEN

## 2022-08-27 ASSESSMENT — PAIN DESCRIPTION - ORIENTATION: ORIENTATION: MID

## 2022-08-27 NOTE — PROGRESS NOTES
Pt had an episode of painless dark red blood per rectum this am.  Pt VS stable. Pt voices no complaints and has no pain. Surgical team to make rounds soon.

## 2022-08-27 NOTE — PROGRESS NOTES
Progress note: The patient states that her pain is improved. The patient did have some bloody discharge from the rectum. Vital signs:    Afebrile  Normal pulse rate and blood pressure  Urine output 350 cc recorded for the last 24 hours  Bloody discharge from the rectum x1    Laboratory studies:    WBC is 11.1  Hemoglobin of 11.3 and hematocrit of 35.4  Platelet count of 837    Physical examination:    The incisions are well approximated without evidence of infection. The patient is slightly distended with appropriate incisional tenderness. Assessment:    Postoperative day 1  Robotic assisted laparoscopic low anterior resection with significant inflammatory changes involving the pelvic sidewall on the left and uterus.   No bowel function other than some bloody discharge    Plan:    Clear liquid diet  IV fluids  Remove Best catheter tomorrow morning    Sue Ladd MD

## 2022-08-27 NOTE — PROGRESS NOTES
Best removed as it was found to be leaking and disconnected from patient. Patient will be DTV [id number]. RN educated patient and patient verbalized understanding of DTV trial.

## 2022-08-27 NOTE — PLAN OF CARE
Problem: Discharge Planning  Goal: Discharge to home or other facility with appropriate resources  Outcome: Progressing  Flowsheets (Taken 8/26/2022 1625 by Adrian Feldman RN)  Discharge to home or other facility with appropriate resources: Refer to discharge planning if patient needs post-hospital services based on physician order or complex needs related to functional status, cognitive ability or social support system     Problem: Pain  Goal: Verbalizes/displays adequate comfort level or baseline comfort level  Outcome: Progressing     Problem: Safety - Adult  Goal: Free from fall injury  Outcome: Progressing

## 2022-08-27 NOTE — PROGRESS NOTES
RN educated patient on deep breathing and coughing as well as hourly usage of SMI. Patient demonstrated understanding by showing demonstration of 1500. Patient was able to be weaned to room air. RN will continue to promote hourly usage during hourly rounds.

## 2022-08-28 LAB
ANION GAP SERPL CALCULATED.3IONS-SCNC: 7 MMOL/L (ref 7–16)
BASOPHILS ABSOLUTE: 0.01 E9/L (ref 0–0.2)
BASOPHILS RELATIVE PERCENT: 0.1 % (ref 0–2)
BUN BLDV-MCNC: 13 MG/DL (ref 6–20)
CALCIUM SERPL-MCNC: 8.9 MG/DL (ref 8.6–10.2)
CHLORIDE BLD-SCNC: 108 MMOL/L (ref 98–107)
CO2: 26 MMOL/L (ref 22–29)
CREAT SERPL-MCNC: 0.9 MG/DL (ref 0.5–1)
EOSINOPHILS ABSOLUTE: 0.03 E9/L (ref 0.05–0.5)
EOSINOPHILS RELATIVE PERCENT: 0.4 % (ref 0–6)
GFR AFRICAN AMERICAN: >60
GFR NON-AFRICAN AMERICAN: >60 ML/MIN/1.73
GLUCOSE BLD-MCNC: 94 MG/DL (ref 74–99)
HCT VFR BLD CALC: 28.4 % (ref 34–48)
HEMOGLOBIN: 9.3 G/DL (ref 11.5–15.5)
IMMATURE GRANULOCYTES #: 0.02 E9/L
IMMATURE GRANULOCYTES %: 0.3 % (ref 0–5)
LYMPHOCYTES ABSOLUTE: 1.62 E9/L (ref 1.5–4)
LYMPHOCYTES RELATIVE PERCENT: 20.7 % (ref 20–42)
MCH RBC QN AUTO: 28.6 PG (ref 26–35)
MCHC RBC AUTO-ENTMCNC: 32.7 % (ref 32–34.5)
MCV RBC AUTO: 87.4 FL (ref 80–99.9)
MONOCYTES ABSOLUTE: 0.52 E9/L (ref 0.1–0.95)
MONOCYTES RELATIVE PERCENT: 6.6 % (ref 2–12)
NEUTROPHILS ABSOLUTE: 5.64 E9/L (ref 1.8–7.3)
NEUTROPHILS RELATIVE PERCENT: 71.9 % (ref 43–80)
PDW BLD-RTO: 13.8 FL (ref 11.5–15)
PLATELET # BLD: 172 E9/L (ref 130–450)
PMV BLD AUTO: 11.5 FL (ref 7–12)
POTASSIUM REFLEX MAGNESIUM: 4.1 MMOL/L (ref 3.5–5)
RBC # BLD: 3.25 E12/L (ref 3.5–5.5)
SODIUM BLD-SCNC: 141 MMOL/L (ref 132–146)
WBC # BLD: 7.8 E9/L (ref 4.5–11.5)

## 2022-08-28 PROCEDURE — 36415 COLL VENOUS BLD VENIPUNCTURE: CPT

## 2022-08-28 PROCEDURE — 1200000000 HC SEMI PRIVATE

## 2022-08-28 PROCEDURE — 85025 COMPLETE CBC W/AUTO DIFF WBC: CPT

## 2022-08-28 PROCEDURE — 6370000000 HC RX 637 (ALT 250 FOR IP): Performed by: SURGERY

## 2022-08-28 PROCEDURE — 80048 BASIC METABOLIC PNL TOTAL CA: CPT

## 2022-08-28 PROCEDURE — 6360000002 HC RX W HCPCS: Performed by: SURGERY

## 2022-08-28 PROCEDURE — 2580000003 HC RX 258: Performed by: SURGERY

## 2022-08-28 RX ADMIN — ACETAMINOPHEN 650 MG: 325 TABLET ORAL at 17:23

## 2022-08-28 RX ADMIN — VENLAFAXINE HYDROCHLORIDE 37.5 MG: 37.5 CAPSULE, EXTENDED RELEASE ORAL at 09:32

## 2022-08-28 RX ADMIN — OXYCODONE 5 MG: 5 TABLET ORAL at 09:33

## 2022-08-28 RX ADMIN — ACETAMINOPHEN 650 MG: 325 TABLET ORAL at 02:04

## 2022-08-28 RX ADMIN — SODIUM CHLORIDE, PRESERVATIVE FREE 10 ML: 5 INJECTION INTRAVENOUS at 21:00

## 2022-08-28 RX ADMIN — ENOXAPARIN SODIUM 40 MG: 100 INJECTION SUBCUTANEOUS at 09:33

## 2022-08-28 RX ADMIN — LEVOTHYROXINE SODIUM 50 MCG: 0.05 TABLET ORAL at 09:32

## 2022-08-28 RX ADMIN — CALCIUM 500 MG: 500 TABLET ORAL at 20:57

## 2022-08-28 RX ADMIN — OXYCODONE 5 MG: 5 TABLET ORAL at 17:24

## 2022-08-28 RX ADMIN — ACETAMINOPHEN 650 MG: 325 TABLET ORAL at 09:31

## 2022-08-28 RX ADMIN — MULTIPLE VITAMINS W/ MINERALS TAB 1 TABLET: TAB at 09:31

## 2022-08-28 RX ADMIN — OXYCODONE 5 MG: 5 TABLET ORAL at 02:04

## 2022-08-28 RX ADMIN — OXYCODONE 5 MG: 5 TABLET ORAL at 20:58

## 2022-08-28 RX ADMIN — PANTOPRAZOLE SODIUM 40 MG: 40 TABLET, DELAYED RELEASE ORAL at 09:32

## 2022-08-28 RX ADMIN — CALCIUM 500 MG: 500 TABLET ORAL at 09:32

## 2022-08-28 RX ADMIN — ACETAMINOPHEN 650 MG: 325 TABLET ORAL at 20:57

## 2022-08-28 ASSESSMENT — PAIN DESCRIPTION - LOCATION
LOCATION: ABDOMEN

## 2022-08-28 ASSESSMENT — PAIN DESCRIPTION - ORIENTATION
ORIENTATION: MID
ORIENTATION: ANTERIOR;LOWER
ORIENTATION: MID

## 2022-08-28 ASSESSMENT — PAIN DESCRIPTION - FREQUENCY: FREQUENCY: INTERMITTENT

## 2022-08-28 ASSESSMENT — PAIN SCALES - GENERAL
PAINLEVEL_OUTOF10: 8
PAINLEVEL_OUTOF10: 4
PAINLEVEL_OUTOF10: 5
PAINLEVEL_OUTOF10: 5

## 2022-08-28 ASSESSMENT — PAIN - FUNCTIONAL ASSESSMENT
PAIN_FUNCTIONAL_ASSESSMENT: PREVENTS OR INTERFERES SOME ACTIVE ACTIVITIES AND ADLS
PAIN_FUNCTIONAL_ASSESSMENT: PREVENTS OR INTERFERES SOME ACTIVE ACTIVITIES AND ADLS

## 2022-08-28 ASSESSMENT — PAIN DESCRIPTION - DESCRIPTORS
DESCRIPTORS: ACHING;SORE;SHARP
DESCRIPTORS: ACHING;SORE;SHARP

## 2022-08-28 ASSESSMENT — PAIN DESCRIPTION - ONSET: ONSET: ON-GOING

## 2022-08-28 ASSESSMENT — PAIN DESCRIPTION - PAIN TYPE: TYPE: ACUTE PAIN

## 2022-08-28 NOTE — PROGRESS NOTES
RN educated patient on the importance of progressive ambulation. Patient verbalized and demonstrated understanding by ambulating around unit multiple times throughout the day.

## 2022-08-28 NOTE — PROGRESS NOTES
Progress note: The patient states that the rectal bleeding has improved. The patient denies any other significant symptomatology.     Vital signs:    Afebrile  Normal pulse rate  Normal blood pressure  Urine output 1350 cc for the last 24 hours    Laboratory studies:    WBCs of 7.8  Hemoglobin hematocrit of 9.3 and 28.4    Physical examination:    The abdomen is soft, nondistended with appropriate incisional tenderness without guarding or rebound    Assessment:    Postoperative day 2  Robotic assisted laparoscopic low anterior resection  Hematochezia likely related to the anastomosis    Plan:    Advance diet  Monitor hemoglobin and hematocrit  If the patient has ongoing bleeding then a flexible endoscopy will be performed tomorrow    Robyn Krueger MD

## 2022-08-29 LAB
ANION GAP SERPL CALCULATED.3IONS-SCNC: 7 MMOL/L (ref 7–16)
BASOPHILS ABSOLUTE: 0.02 E9/L (ref 0–0.2)
BASOPHILS RELATIVE PERCENT: 0.3 % (ref 0–2)
BUN BLDV-MCNC: 10 MG/DL (ref 6–20)
CALCIUM SERPL-MCNC: 8.6 MG/DL (ref 8.6–10.2)
CHLORIDE BLD-SCNC: 105 MMOL/L (ref 98–107)
CO2: 27 MMOL/L (ref 22–29)
CREAT SERPL-MCNC: 0.9 MG/DL (ref 0.5–1)
EOSINOPHILS ABSOLUTE: 0.16 E9/L (ref 0.05–0.5)
EOSINOPHILS RELATIVE PERCENT: 2.4 % (ref 0–6)
GFR AFRICAN AMERICAN: >60
GFR NON-AFRICAN AMERICAN: >60 ML/MIN/1.73
GLUCOSE BLD-MCNC: 93 MG/DL (ref 74–99)
HCT VFR BLD CALC: 30.8 % (ref 34–48)
HEMOGLOBIN: 10 G/DL (ref 11.5–15.5)
IMMATURE GRANULOCYTES #: 0.01 E9/L
IMMATURE GRANULOCYTES %: 0.2 % (ref 0–5)
LYMPHOCYTES ABSOLUTE: 1.26 E9/L (ref 1.5–4)
LYMPHOCYTES RELATIVE PERCENT: 19.2 % (ref 20–42)
MCH RBC QN AUTO: 28.9 PG (ref 26–35)
MCHC RBC AUTO-ENTMCNC: 32.5 % (ref 32–34.5)
MCV RBC AUTO: 89 FL (ref 80–99.9)
MONOCYTES ABSOLUTE: 0.49 E9/L (ref 0.1–0.95)
MONOCYTES RELATIVE PERCENT: 7.5 % (ref 2–12)
NEUTROPHILS ABSOLUTE: 4.62 E9/L (ref 1.8–7.3)
NEUTROPHILS RELATIVE PERCENT: 70.4 % (ref 43–80)
PDW BLD-RTO: 13.7 FL (ref 11.5–15)
PLATELET # BLD: 210 E9/L (ref 130–450)
PMV BLD AUTO: 10.9 FL (ref 7–12)
POTASSIUM REFLEX MAGNESIUM: 3.8 MMOL/L (ref 3.5–5)
RBC # BLD: 3.46 E12/L (ref 3.5–5.5)
SODIUM BLD-SCNC: 139 MMOL/L (ref 132–146)
WBC # BLD: 6.6 E9/L (ref 4.5–11.5)

## 2022-08-29 PROCEDURE — 6370000000 HC RX 637 (ALT 250 FOR IP): Performed by: STUDENT IN AN ORGANIZED HEALTH CARE EDUCATION/TRAINING PROGRAM

## 2022-08-29 PROCEDURE — 85025 COMPLETE CBC W/AUTO DIFF WBC: CPT

## 2022-08-29 PROCEDURE — 1200000000 HC SEMI PRIVATE

## 2022-08-29 PROCEDURE — 6360000002 HC RX W HCPCS: Performed by: SURGERY

## 2022-08-29 PROCEDURE — 80048 BASIC METABOLIC PNL TOTAL CA: CPT

## 2022-08-29 PROCEDURE — 2580000003 HC RX 258: Performed by: SURGERY

## 2022-08-29 PROCEDURE — 6370000000 HC RX 637 (ALT 250 FOR IP): Performed by: SURGERY

## 2022-08-29 PROCEDURE — 36415 COLL VENOUS BLD VENIPUNCTURE: CPT

## 2022-08-29 RX ORDER — POTASSIUM CHLORIDE 20 MEQ/1
20 TABLET, EXTENDED RELEASE ORAL ONCE
Status: COMPLETED | OUTPATIENT
Start: 2022-08-29 | End: 2022-08-29

## 2022-08-29 RX ADMIN — ACETAMINOPHEN 650 MG: 325 TABLET ORAL at 17:09

## 2022-08-29 RX ADMIN — ENOXAPARIN SODIUM 40 MG: 100 INJECTION SUBCUTANEOUS at 08:09

## 2022-08-29 RX ADMIN — SODIUM CHLORIDE, PRESERVATIVE FREE 10 ML: 5 INJECTION INTRAVENOUS at 20:58

## 2022-08-29 RX ADMIN — VENLAFAXINE HYDROCHLORIDE 37.5 MG: 37.5 CAPSULE, EXTENDED RELEASE ORAL at 08:08

## 2022-08-29 RX ADMIN — OXYCODONE 5 MG: 5 TABLET ORAL at 05:25

## 2022-08-29 RX ADMIN — CALCIUM 500 MG: 500 TABLET ORAL at 20:22

## 2022-08-29 RX ADMIN — LEVOTHYROXINE SODIUM 50 MCG: 0.05 TABLET ORAL at 08:08

## 2022-08-29 RX ADMIN — POTASSIUM CHLORIDE 20 MEQ: 1500 TABLET, EXTENDED RELEASE ORAL at 08:09

## 2022-08-29 RX ADMIN — MULTIPLE VITAMINS W/ MINERALS TAB 1 TABLET: TAB at 08:08

## 2022-08-29 RX ADMIN — CALCIUM 500 MG: 500 TABLET ORAL at 08:08

## 2022-08-29 RX ADMIN — ACETAMINOPHEN 650 MG: 325 TABLET ORAL at 05:24

## 2022-08-29 RX ADMIN — ACETAMINOPHEN 650 MG: 325 TABLET ORAL at 11:27

## 2022-08-29 RX ADMIN — ACETAMINOPHEN 650 MG: 325 TABLET ORAL at 20:22

## 2022-08-29 RX ADMIN — PANTOPRAZOLE SODIUM 40 MG: 40 TABLET, DELAYED RELEASE ORAL at 08:09

## 2022-08-29 ASSESSMENT — PAIN - FUNCTIONAL ASSESSMENT
PAIN_FUNCTIONAL_ASSESSMENT: ACTIVITIES ARE NOT PREVENTED
PAIN_FUNCTIONAL_ASSESSMENT: ACTIVITIES ARE NOT PREVENTED

## 2022-08-29 ASSESSMENT — PAIN DESCRIPTION - LOCATION
LOCATION: ABDOMEN

## 2022-08-29 ASSESSMENT — PAIN DESCRIPTION - FREQUENCY
FREQUENCY: INTERMITTENT
FREQUENCY: INTERMITTENT

## 2022-08-29 ASSESSMENT — PAIN DESCRIPTION - ORIENTATION
ORIENTATION: MID
ORIENTATION: MID
ORIENTATION: RIGHT;LEFT;MID

## 2022-08-29 ASSESSMENT — PAIN SCALES - GENERAL
PAINLEVEL_OUTOF10: 8
PAINLEVEL_OUTOF10: 1
PAINLEVEL_OUTOF10: 0
PAINLEVEL_OUTOF10: 1

## 2022-08-29 ASSESSMENT — PAIN DESCRIPTION - ONSET
ONSET: ON-GOING
ONSET: ON-GOING

## 2022-08-29 ASSESSMENT — PAIN DESCRIPTION - DESCRIPTORS
DESCRIPTORS: ACHING
DESCRIPTORS: ACHING;SORE
DESCRIPTORS: ACHING;SORE

## 2022-08-29 ASSESSMENT — PAIN DESCRIPTION - PAIN TYPE
TYPE: ACUTE PAIN;SURGICAL PAIN
TYPE: ACUTE PAIN;SURGICAL PAIN

## 2022-08-29 NOTE — PROGRESS NOTES
GENERAL SURGERY  DAILY PROGRESS NOTE  8/29/2022    Subjective:  Persistent cramping abdominal pain. Passed blood clots per rectum 3x overnight. Tolerating full liquids    Objective:  /70   Pulse 72   Temp 98.3 °F (36.8 °C) (Oral)   Resp 16   Ht 5' 5\" (1.651 m)   Wt 154 lb 5.2 oz (70 kg)   LMP 02/10/2011   SpO2 96%   BMI 25.68 kg/m²     General Appearance:  awake, alert, oriented  Skin:  Skin color, texture, turgor normal  Head/face:  NCAT  Eyes:  No gross abnormalities. Sclera nonicteric  Lungs/Chest:  Normal expansion. No respiratory distress. On room air  Heart: Warm throughout. Regular rate   Abdomen:  Soft, minimal tenderness, mildly distended. Incisions C/D/I    Extremities: Extremities warm to touch, pink      I have personally reviewed all relevant labs and imaging. Slight increase of hemoglobin from 9.3 to 10    Assessment/Plan:  59 y.o. S/p robotic assisted laparoscopic low anterior resection for diverticulitis on 8/26. Now with hematochezia     - NPO for possible flex sig today  - hypokalemia - replaced  - monitor Hgb  - IVF    Electronically signed by Rachell Miller DO on 8/29/2022 at 7:14 AM     Addendum:  Hgb increased from 9.3 to 10. Will start regular diet, low residue. No plan for flex sig today given improving hgb. Electronically signed by Rosio Osorio MD on 8/29/2022 at 8:36 AM    The patient was seen and examined the chart was reviewed. I agree with the assessment and plan.

## 2022-08-30 VITALS
TEMPERATURE: 98.5 F | DIASTOLIC BLOOD PRESSURE: 72 MMHG | OXYGEN SATURATION: 94 % | BODY MASS INDEX: 25.71 KG/M2 | HEIGHT: 65 IN | RESPIRATION RATE: 16 BRPM | HEART RATE: 71 BPM | WEIGHT: 154.32 LBS | SYSTOLIC BLOOD PRESSURE: 150 MMHG

## 2022-08-30 LAB
ANION GAP SERPL CALCULATED.3IONS-SCNC: 8 MMOL/L (ref 7–16)
BASOPHILS ABSOLUTE: 0.02 E9/L (ref 0–0.2)
BASOPHILS RELATIVE PERCENT: 0.3 % (ref 0–2)
BUN BLDV-MCNC: 10 MG/DL (ref 6–20)
CALCIUM SERPL-MCNC: 8.6 MG/DL (ref 8.6–10.2)
CHLORIDE BLD-SCNC: 105 MMOL/L (ref 98–107)
CO2: 24 MMOL/L (ref 22–29)
CREAT SERPL-MCNC: 0.9 MG/DL (ref 0.5–1)
EOSINOPHILS ABSOLUTE: 0.3 E9/L (ref 0.05–0.5)
EOSINOPHILS RELATIVE PERCENT: 4.9 % (ref 0–6)
GFR AFRICAN AMERICAN: >60
GFR NON-AFRICAN AMERICAN: >60 ML/MIN/1.73
GLUCOSE BLD-MCNC: 100 MG/DL (ref 74–99)
HCT VFR BLD CALC: 30.7 % (ref 34–48)
HEMOGLOBIN: 10.1 G/DL (ref 11.5–15.5)
IMMATURE GRANULOCYTES #: 0.02 E9/L
IMMATURE GRANULOCYTES %: 0.3 % (ref 0–5)
LYMPHOCYTES ABSOLUTE: 1.3 E9/L (ref 1.5–4)
LYMPHOCYTES RELATIVE PERCENT: 21.2 % (ref 20–42)
MCH RBC QN AUTO: 28.8 PG (ref 26–35)
MCHC RBC AUTO-ENTMCNC: 32.9 % (ref 32–34.5)
MCV RBC AUTO: 87.5 FL (ref 80–99.9)
MONOCYTES ABSOLUTE: 0.43 E9/L (ref 0.1–0.95)
MONOCYTES RELATIVE PERCENT: 7 % (ref 2–12)
NEUTROPHILS ABSOLUTE: 4.05 E9/L (ref 1.8–7.3)
NEUTROPHILS RELATIVE PERCENT: 66.3 % (ref 43–80)
PDW BLD-RTO: 13.6 FL (ref 11.5–15)
PLATELET # BLD: 229 E9/L (ref 130–450)
PMV BLD AUTO: 10.7 FL (ref 7–12)
POTASSIUM REFLEX MAGNESIUM: 3.9 MMOL/L (ref 3.5–5)
RBC # BLD: 3.51 E12/L (ref 3.5–5.5)
SODIUM BLD-SCNC: 137 MMOL/L (ref 132–146)
WBC # BLD: 6.1 E9/L (ref 4.5–11.5)

## 2022-08-30 PROCEDURE — 85025 COMPLETE CBC W/AUTO DIFF WBC: CPT

## 2022-08-30 PROCEDURE — 80048 BASIC METABOLIC PNL TOTAL CA: CPT

## 2022-08-30 PROCEDURE — 6370000000 HC RX 637 (ALT 250 FOR IP): Performed by: STUDENT IN AN ORGANIZED HEALTH CARE EDUCATION/TRAINING PROGRAM

## 2022-08-30 PROCEDURE — 36415 COLL VENOUS BLD VENIPUNCTURE: CPT

## 2022-08-30 PROCEDURE — 6370000000 HC RX 637 (ALT 250 FOR IP): Performed by: SURGERY

## 2022-08-30 RX ORDER — PANTOPRAZOLE SODIUM 40 MG/1
40 TABLET, DELAYED RELEASE ORAL DAILY
Qty: 30 TABLET | Refills: 0 | Status: SHIPPED | OUTPATIENT
Start: 2022-08-30 | End: 2022-09-29

## 2022-08-30 RX ORDER — POTASSIUM CHLORIDE 750 MG/1
10 TABLET, EXTENDED RELEASE ORAL ONCE
Status: COMPLETED | OUTPATIENT
Start: 2022-08-30 | End: 2022-08-30

## 2022-08-30 RX ORDER — OXYCODONE HYDROCHLORIDE 5 MG/1
5 TABLET ORAL EVERY 6 HOURS PRN
Qty: 28 TABLET | Refills: 0 | Status: SHIPPED | OUTPATIENT
Start: 2022-08-30 | End: 2022-09-06

## 2022-08-30 RX ADMIN — POTASSIUM CHLORIDE 10 MEQ: 750 TABLET, EXTENDED RELEASE ORAL at 06:53

## 2022-08-30 RX ADMIN — ACETAMINOPHEN 650 MG: 325 TABLET ORAL at 08:17

## 2022-08-30 RX ADMIN — CALCIUM 500 MG: 500 TABLET ORAL at 08:17

## 2022-08-30 RX ADMIN — LEVOTHYROXINE SODIUM 50 MCG: 0.05 TABLET ORAL at 08:17

## 2022-08-30 RX ADMIN — ACETAMINOPHEN 650 MG: 325 TABLET ORAL at 02:21

## 2022-08-30 RX ADMIN — PANTOPRAZOLE SODIUM 40 MG: 40 TABLET, DELAYED RELEASE ORAL at 08:17

## 2022-08-30 RX ADMIN — VENLAFAXINE HYDROCHLORIDE 37.5 MG: 37.5 CAPSULE, EXTENDED RELEASE ORAL at 08:17

## 2022-08-30 RX ADMIN — MULTIPLE VITAMINS W/ MINERALS TAB 1 TABLET: TAB at 08:17

## 2022-08-30 ASSESSMENT — PAIN SCALES - GENERAL: PAINLEVEL_OUTOF10: 1

## 2022-08-30 NOTE — PLAN OF CARE
Problem: Discharge Planning  Goal: Discharge to home or other facility with appropriate resources  8/29/2022 2142 by Colt Loredo RN  Outcome: Progressing  8/29/2022 1209 by Lorenzo Leslie RN  Outcome: Progressing     Problem: Pain  Goal: Verbalizes/displays adequate comfort level or baseline comfort level  8/29/2022 2142 by Colt Loredo RN  Outcome: Progressing  8/29/2022 1209 by Lorenzo Leslie RN  Outcome: Progressing     Problem: Safety - Adult  Goal: Free from fall injury  8/29/2022 2142 by Colt Loredo RN  Outcome: Progressing  8/29/2022 1209 by Lorenzo Leslie RN  Outcome: Progressing

## 2022-08-30 NOTE — PLAN OF CARE
Problem: Discharge Planning  Goal: Discharge to home or other facility with appropriate resources  8/30/2022 0901 by Sue Matta RN  Outcome: Completed  8/29/2022 2142 by Duyen Ramos RN  Outcome: Progressing     Problem: Pain  Goal: Verbalizes/displays adequate comfort level or baseline comfort level  8/30/2022 0901 by Sue Matta RN  Outcome: Adequate for Discharge  8/29/2022 2142 by Duyen Ramos RN  Outcome: Progressing     Problem: Safety - Adult  Goal: Free from fall injury  8/30/2022 0901 by Sue Matta RN  Outcome: Adequate for Discharge  8/29/2022 2142 by Duyen Ramos RN  Outcome: Progressing

## 2022-08-30 NOTE — PROGRESS NOTES
GENERAL SURGERY  DAILY PROGRESS NOTE  8/30/2022    Subjective:  Rectal bleeding continues but is now slowing down. Tolerating regular diet. Ambulating without difficulty. Eagerly awaiting discharge      Objective:  /78   Pulse 84   Temp 98.8 °F (37.1 °C) (Oral)   Resp 16   Ht 5' 5\" (1.651 m)   Wt 154 lb 5.2 oz (70 kg)   LMP 02/10/2011   SpO2 97%   BMI 25.68 kg/m²     General Appearance:  awake, alert, oriented  Skin:  Skin color, texture, turgor normal  Head/face:  NCAT  Eyes:  No gross abnormalities. Sclera nonicteric  Lungs/Chest:  Normal expansion. No respiratory distress. On room air  Heart: Warm throughout. Regular rate   Abdomen:  Soft, minimal tenderness, non distended. Incisions C/D/I    Extremities: Extremities warm to touch, pink      I have personally reviewed all relevant labs and imaging. Slight increase of hemoglobin 10.1 from 10    Assessment/Plan:  59 y.o. S/p robotic assisted laparoscopic low anterior resection for diverticulitis on 8/26.  Post-op hematochezia, resolving     - low residue diet  - hypokalemia - replaced  - discharge home    Electronically signed by Dario Martinez DO on 8/30/2022 at 5:38 AM

## 2022-08-30 NOTE — DISCHARGE INSTRUCTIONS
SURGERY DISCHARGE INSTRUCTIONS    FOLLOW UP: Call office to schedule follow-up appointment in 2 weeks. DIET: Advance your diet as tolerated. Start with light diet and progress to your normal diet as you feel like eating. If you experience nausea or repeated episodes of vomiting which persist beyond 12-24 hours, notify your doctor. ACTIVITY: No driving while on prescription pain medication. No heavy lifting for 6 weeks from surgery - nothing over 10 lbs, or heavier than a milk jug. SHOWER/BATHING: No tub bathing, swimming or soaking for 2 weeks. WOUND CARE: Keep incisions clean and dry. Always ensure you and your care giver clean hands before and after caring for the wound. You may place ice on incisions to decrease the pain and bruising. MEDICATIONS: Take as prescribed. You may take over the counter ibuprofen or tylenol for pain as directed, limit total amount of acetaminophen (tylenol) to 3 grams per 24-hour period. Okay to resume anticoagulant medication after 24hrs. You may experience constipation while taking pain medication, you may take over the counter stool softeners (Docusate/Colace or Senokot-S) as directed.        SPECIAL INSTRUCTIONS:   Call physician if they or any other problems occur:  Call the office if you have a fever over >101F, or if your incision becomes red, tender, or drains more than a small amount of clear fluid  Fever over 101°    Redness, swelling, hardness or warmth at the operative site  Unrelieved nausea    Foul smelling or cloudy drainage at the operative site   Unrelieved pain    Blood soaked dressing (Some oozing may be normal)

## 2022-08-30 NOTE — DISCHARGE SUMMARY
Physician Discharge Summary     Patient ID:  Mandy Ahn  [id number]  59 y.o.  1965    Admit date: 8/26/2022    Discharge date and time: No discharge date for patient encounter. Admitting Physician: Bernard Stearns MD     Admission Diagnoses: Diverticulitis [K57.92]  Diverticulitis of large intestine with perforation and abscess without bleeding [K57.20]  History of low anterior resection of rectum [Z90.49]    Discharge Diagnoses: Principal Problem:    Diverticulitis of large intestine with perforation and abscess without bleeding  Active Problems:    History of low anterior resection of rectum  Resolved Problems:    * No resolved hospital problems. *      Admission Condition: fair    Discharged Condition: stable    Indication for Admission: Low anterior resection for recurrent diverticulitis    Hospital Course/Procedures/Operation/treatments:   Patient admitted on 8/26 following robotic assisted low anterior resection. Surgery was performed for recurrent diverticulitis. Postoperatively, the patient experienced rectal bleeding. No transfusion was required. The bleeding slowed spontaneously. The patient's diet was advanced. She was discharged on 8/30 in good condition, tolerating a regular diet. Hemoglobin steadily increasing. Follow up information and return precautions have been discussed with the patient and included with discharge information. Consults:   None    Significant Diagnostic Studies:   No results found. Discharge Exam:  General Appearance:  awake, alert, oriented  Skin:  Skin color, texture, turgor normal  Head/face:  NCAT  Eyes:  No gross abnormalities. Sclera nonicteric  Lungs/Chest:  Normal expansion. No respiratory distress. On room air  Heart: Warm throughout. Regular rate   Abdomen:  Soft, minimal tenderness, non distended.  Incisions C/D/I    Extremities: Extremities warm to touch, pink     Disposition: home    In process/preliminary results:  Outstanding Order Results       Date and Time Order Name Status Description    8/26/2022 12:00 AM Cytology, Non-Gyn In process     8/26/2022 12:00 AM Surgical Pathology In process             Patient Instructions:   Current Discharge Medication List             Details   venlafaxine (EFFEXOR XR) 37.5 MG extended release capsule TAKE 1 CAPSULE BY MOUTH ONCE DAILY  Qty: 90 capsule, Refills: 1    Associated Diagnoses: Dysthymia      levothyroxine (SYNTHROID) 50 MCG tablet TAKE ONE TABLET BY MOUTH EVERY DAY  Qty: 90 tablet, Refills: 3    Associated Diagnoses: Acquired hypothyroidism      Multiple Vitamins-Minerals (THERAPEUTIC MULTIVITAMIN-MINERALS) tablet Take 1 tablet by mouth daily      calcium carbonate 600 MG TABS tablet Take 1 tablet by mouth 2 times daily             Discharge Instructions  SURGERY DISCHARGE INSTRUCTIONS    FOLLOW UP: Call office to schedule follow-up appointment in 2 weeks. DIET: Advance your diet as tolerated. Start with light diet and progress to your normal diet as you feel like eating. If you experience nausea or repeated episodes of vomiting which persist beyond 12-24 hours, notify your doctor. ACTIVITY: No driving while on prescription pain medication. No heavy lifting for 6 weeks from surgery - nothing over 10 lbs, or heavier than a milk jug. SHOWER/BATHING: No tub bathing, swimming or soaking for 2 weeks. WOUND CARE: Keep incisions clean and dry. Always ensure you and your care giver clean hands before and after caring for the wound. You may place ice on incisions to decrease the pain and bruising. MEDICATIONS: Take as prescribed. You may take over the counter ibuprofen or tylenol for pain as directed, limit total amount of acetaminophen (tylenol) to 3 grams per 24-hour period. Okay to resume anticoagulant medication after 24hrs. You may experience constipation while taking pain medication, you may take over the counter stool softeners (Docusate/Colace or Senokot-S) as directed. SPECIAL INSTRUCTIONS:   Call physician if they or any other problems occur:  Call the office if you have a fever over >101F, or if your incision becomes red, tender, or drains more than a small amount of clear fluid  Fever over 101°    Redness, swelling, hardness or warmth at the operative site  Unrelieved nausea    Foul smelling or cloudy drainage at the operative site   Unrelieved pain    Blood soaked dressing (Some oozing may be normal)      Follow up:   MD Antoni Schreiber [address] [place] [address]  [phone]    Follow up in 2 week(s)  post op check     Signed:  Mitali Dunbar DO  8/30/2022  7:15 AM

## 2022-12-02 ENCOUNTER — HOSPITAL ENCOUNTER (EMERGENCY)
Age: 57
Discharge: HOME | End: 2022-12-03
Payer: COMMERCIAL

## 2022-12-02 VITALS
SYSTOLIC BLOOD PRESSURE: 179 MMHG | WEIGHT: 140 LBS | HEART RATE: 86 BPM | RESPIRATION RATE: 16 BRPM | DIASTOLIC BLOOD PRESSURE: 97 MMHG | BODY MASS INDEX: 23.32 KG/M2 | HEIGHT: 65 IN | TEMPERATURE: 98.6 F | OXYGEN SATURATION: 98 %

## 2022-12-02 DIAGNOSIS — J02.9 VIRAL PHARYNGITIS: Primary | ICD-10-CM

## 2022-12-02 PROCEDURE — 99283 EMERGENCY DEPT VISIT LOW MDM: CPT

## 2022-12-02 RX ORDER — KETOROLAC TROMETHAMINE 30 MG/ML
30 INJECTION, SOLUTION INTRAMUSCULAR; INTRAVENOUS ONCE
Status: DISCONTINUED | OUTPATIENT
Start: 2022-12-03 | End: 2022-12-03 | Stop reason: HOSPADM

## 2022-12-03 LAB — STREP GRP A PCR: NEGATIVE

## 2022-12-03 PROCEDURE — 87880 STREP A ASSAY W/OPTIC: CPT

## 2022-12-03 RX ORDER — HYDROCORTISONE 1 G/100G
1 CREAM TOPICAL ONCE
Qty: 1 ML | Refills: 0 | Status: SHIPPED | OUTPATIENT
Start: 2022-12-03 | End: 2022-12-03

## 2022-12-03 NOTE — ED PROVIDER NOTES
[facility]     Department of Emergency Medicine   ED  Provider Note  Admit Date/RoomTime: 12/2/2022 11:46 PM  ED Room: 31/31    12/3/22  12:49 AM EST      HPI: Selina Reeves is a 59 y.o. female with a past medical history of  has a past medical history of Anxiety, Blood transfusion, Breast cancer ([facility] [address] Diverticulosis, History of blood transfusion, History of therapeutic radiation, Hx antineoplastic chemo, Hypothyroidism, PONV (postoperative nausea and vomiting), Pure hypercholesterolemia, and Thyroid disease. Presenting to the emergency department today for evaluation of sore throat. Patient reports that her symptoms started a few days ago gradually. The history is obtained from the patient. Patient states that she tested positive for COVID-19 with an at home COVID test.  She states that she wants to [facility] sure that she does not have strep\". Patient has no other reported complaints. Patient denies excessive fatigue or sleeping greater than 18 hours a day. Patient denies exposure to mononucleosis. The patient denies any abdominal pain, nausea, vomiting, diarrhea, left upper quadrant fullness, or early satiety. The patient also denies difficulty breathing, hemoptysis, neck pain/stiffness, or blurry vision, fever, chills, shortness of breath, difficulty breathing, difficulty swallowing or chest pain. Denies ageusia or anosmia. Denies exposure to any sick contacts. Denies any recent antibiotic use. patient reports eating & drinking well. She has used over-the-counter medication for symptom control which is helped minimally. Denies any other complaints today.         Review of Systems:   Pertinent positives and negatives are stated within HPI, all other systems reviewed and are negative.           --------------------------------------------- PAST HISTORY ---------------------------------------------  Past Medical History:  has a past medical history of Anxiety, Blood transfusion, Breast cancer ([facility] [address] Diverticulosis, History of blood transfusion, History of therapeutic radiation, Hx antineoplastic chemo, Hypothyroidism, PONV (postoperative nausea and vomiting), Pure hypercholesterolemia, and Thyroid disease. Past Surgical History:  has a past surgical history that includes Ectopic pregnancy surgery (1981); Breast lumpectomy (Left, 2010); Colonoscopy (N/A, 5/21/2020); picc line insertion nurse (4/12/2022); colectomy (N/A, 8/26/2022); and Cystoscopy (Bilateral, 8/26/2022). Social History:  reports that she has never smoked. She has never used smokeless tobacco. She reports current alcohol use of about 2.0 standard drinks per week. She reports that she does not use drugs. Family History: family history includes Breast Cancer in her mother and paternal grandmother; Heart Attack in her mother; Heart Disease in her sister; Other in her father; Scoliosis in her daughter. The patients home medications have been reviewed. Allergies: Patient has no known allergies. Physical exam:  Constitutional: Vital signs are reviewed the patient is comfortable. The patient is alert and oriented and conversant. Pleasant & cooperative. Well appearing & nontoxic. Speech clear. Head: The head is atraumatic and normocephalic. Face symmetrical. No audible nasal congestion. Eyes: No discharge or tearing from the eyes. The sclera are normal.  Ears: TM pearly gray bilaterally demonstrate no erythema, no bulging and no evidence of infection. Ear canal without cerumen. Mouth: Mucus membranes moist without ulcerations. No tongue/lip swelling. The oropharynx with mild erythema and a few blisters present in the posterior pharynx. There is no tonsillar enlargement nor is there any exudate present. No uvular deviation or edema. No tonsillary asymmetry. Floor of the mouth soft, no trismus, handling secretions. Neck: Normal range of motion is achieved in the neck. There is no JVD present.  No meningeal signs are present. No cervical adenopathy. Respiratory: The chest is nontender. Lungs are clear to auscultation bilaterally. No wheezes, rales, or rhonchi. There is no respiratory distress. Respirations easy and unlabored. No audible cough or wheezing. No stridor. Cardiovascular: S1S2, RRR, without murmurs, rubs, clicks or gallops. Abdominal exam: The abdomen is non tender without evidence of peritoneal signs. Specific attention to the left upper quadrant with palpation of the spleen demonstrates no organomegaly or tenderness. Non distended. BS x 4 quadrants. No palpable masses. Musculoskeletal: Moves all extremities x 4. Warm and well perfused. No joint swelling. Skin: warm and dry, without rash. No lesions or open wounds. No cyanosis, jaundice or ecchymosis noted. Neurologic: GCS 15, no tremor, no focal deficits  Psych: Normal Affect  -------------------------------------------------- RESULTS -------------------------------------------------    LABS:  Results for orders placed or performed during the hospital encounter of 12/02/22   Strep screen group a throat    Specimen: Throat   Result Value Ref Range    Strep Grp A PCR Negative Negative       RADIOLOGY:  Interpreted by Radiologist.  No orders to display             ------------------------- NURSING NOTES AND VITALS REVIEWED ---------------------------   The nursing notes within the ED encounter and vital signs as below have been reviewed. BP (!) 179/97   Pulse 86   Temp 98.6 °F (37 °C) (Temporal)   Resp 16   Ht 5' 5\" (1.651 m)   Wt 140 lb (63.5 kg)   LMP 02/10/2011   SpO2 98%   BMI 23.30 kg/m²   Oxygen Saturation Interpretation: Normal    The patients available past medical records and past encounters were reviewed.         ------------------------------ ED COURSE/MEDICAL DECISION MAKING----------------------  Medications   ketorolac (TORADOL) injection 30 mg (has no administration in time range)             Medical Decision Making: Patient presenting to the emergency department today for evaluation of pharyngitis after testing positive for COVID-19. Patient is well appearing, without hypoxia, and appears to be non toxic. Physical examination within normal limits. Rapid strep negative. Encouraged ample fluid intake. Patient can take tylenol/motrin for pain relief or fever if necessary. Patient educated that symptoms are likely viral in etiology and there are no signs of systemic infection currently and nothing to suggest pneumonia/bacterial infection. Patient will return to ED for any new symptoms or worsening of symptoms. Patient to follow up with PCP for reevaluation of symptoms             This patient's ED course included: a personal history and physicial eaxmination and re-evaluation prior to disposition    This patient has remained hemodynamically stable during their ED course. Counseling: The emergency provider has spoken with the patient and discussed todays results, in addition to providing specific details for the plan of care and counseling regarding the diagnosis and prognosis. Questions are answered at this time and they are agreeable with the plan.       --------------------------------- IMPRESSION AND DISPOSITION ---------------------------------    IMPRESSION  1.  Viral pharyngitis        DISPOSITION  Disposition: Discharge to home  Patient condition is good          GABY Robertson CNP  12/03/22 [id number]

## 2022-12-18 DIAGNOSIS — F34.1 DYSTHYMIA: ICD-10-CM

## 2022-12-19 RX ORDER — VENLAFAXINE HYDROCHLORIDE 37.5 MG/1
CAPSULE, EXTENDED RELEASE ORAL
Qty: 90 CAPSULE | Refills: 1 | Status: SHIPPED | OUTPATIENT
Start: 2022-12-19

## 2023-01-04 DIAGNOSIS — E78.00 PURE HYPERCHOLESTEROLEMIA: ICD-10-CM

## 2023-01-04 DIAGNOSIS — E03.9 ACQUIRED HYPOTHYROIDISM: ICD-10-CM

## 2023-01-04 DIAGNOSIS — F34.1 DYSTHYMIA: ICD-10-CM

## 2023-01-04 DIAGNOSIS — E55.9 VITAMIN D DEFICIENCY: ICD-10-CM

## 2023-01-04 LAB
ALBUMIN SERPL-MCNC: 4.5 G/DL (ref 3.5–5.2)
ALP BLD-CCNC: 71 U/L (ref 35–104)
ALT SERPL-CCNC: 16 U/L (ref 0–32)
ANION GAP SERPL CALCULATED.3IONS-SCNC: 17 MMOL/L (ref 7–16)
AST SERPL-CCNC: 22 U/L (ref 0–31)
BASOPHILS ABSOLUTE: 0.06 E9/L (ref 0–0.2)
BASOPHILS RELATIVE PERCENT: 1.1 % (ref 0–2)
BILIRUB SERPL-MCNC: 0.4 MG/DL (ref 0–1.2)
BUN BLDV-MCNC: 20 MG/DL (ref 6–20)
CALCIUM SERPL-MCNC: 10.5 MG/DL (ref 8.6–10.2)
CHLORIDE BLD-SCNC: 101 MMOL/L (ref 98–107)
CHOLESTEROL, TOTAL: 246 MG/DL (ref 0–199)
CO2: 24 MMOL/L (ref 22–29)
CREAT SERPL-MCNC: 1.1 MG/DL (ref 0.5–1)
EOSINOPHILS ABSOLUTE: 0.13 E9/L (ref 0.05–0.5)
EOSINOPHILS RELATIVE PERCENT: 2.3 % (ref 0–6)
GFR SERPL CREATININE-BSD FRML MDRD: 58 ML/MIN/1.73
GLUCOSE BLD-MCNC: 88 MG/DL (ref 74–99)
HCT VFR BLD CALC: 39.2 % (ref 34–48)
HDLC SERPL-MCNC: 53 MG/DL
HEMOGLOBIN: 12.6 G/DL (ref 11.5–15.5)
IMMATURE GRANULOCYTES #: 0.01 E9/L
IMMATURE GRANULOCYTES %: 0.2 % (ref 0–5)
LDL CHOLESTEROL CALCULATED: 160 MG/DL (ref 0–99)
LYMPHOCYTES ABSOLUTE: 1.5 E9/L (ref 1.5–4)
LYMPHOCYTES RELATIVE PERCENT: 26.5 % (ref 20–42)
MCH RBC QN AUTO: 26.1 PG (ref 26–35)
MCHC RBC AUTO-ENTMCNC: 32.1 % (ref 32–34.5)
MCV RBC AUTO: 81.2 FL (ref 80–99.9)
MONOCYTES ABSOLUTE: 0.51 E9/L (ref 0.1–0.95)
MONOCYTES RELATIVE PERCENT: 9 % (ref 2–12)
NEUTROPHILS ABSOLUTE: 3.46 E9/L (ref 1.8–7.3)
NEUTROPHILS RELATIVE PERCENT: 60.9 % (ref 43–80)
PDW BLD-RTO: 15 FL (ref 11.5–15)
PLATELET # BLD: 271 E9/L (ref 130–450)
PMV BLD AUTO: 11 FL (ref 7–12)
POTASSIUM SERPL-SCNC: 4.7 MMOL/L (ref 3.5–5)
RBC # BLD: 4.83 E12/L (ref 3.5–5.5)
SODIUM BLD-SCNC: 142 MMOL/L (ref 132–146)
T4 FREE: 1.16 NG/DL (ref 0.93–1.7)
TOTAL PROTEIN: 7.4 G/DL (ref 6.4–8.3)
TRIGL SERPL-MCNC: 165 MG/DL (ref 0–149)
TSH SERPL DL<=0.05 MIU/L-ACNC: 3.94 UIU/ML (ref 0.27–4.2)
VITAMIN D 25-HYDROXY: 77 NG/ML (ref 30–100)
VLDLC SERPL CALC-MCNC: 33 MG/DL
WBC # BLD: 5.7 E9/L (ref 4.5–11.5)

## 2023-01-16 ENCOUNTER — OFFICE VISIT (OUTPATIENT)
Dept: PRIMARY CARE CLINIC | Age: 58
End: 2023-01-16
Payer: COMMERCIAL

## 2023-01-16 VITALS
DIASTOLIC BLOOD PRESSURE: 86 MMHG | TEMPERATURE: 98.3 F | WEIGHT: 154 LBS | HEIGHT: 65 IN | RESPIRATION RATE: 16 BRPM | BODY MASS INDEX: 25.66 KG/M2 | OXYGEN SATURATION: 99 % | SYSTOLIC BLOOD PRESSURE: 136 MMHG | HEART RATE: 83 BPM

## 2023-01-16 DIAGNOSIS — F34.1 DYSTHYMIA: ICD-10-CM

## 2023-01-16 DIAGNOSIS — K57.30 DIVERTICULOSIS OF LARGE INTESTINE WITHOUT HEMORRHAGE: ICD-10-CM

## 2023-01-16 DIAGNOSIS — E78.00 PURE HYPERCHOLESTEROLEMIA: ICD-10-CM

## 2023-01-16 DIAGNOSIS — E03.9 ACQUIRED HYPOTHYROIDISM: ICD-10-CM

## 2023-01-16 DIAGNOSIS — Z00.00 WELL ADULT EXAM: Primary | ICD-10-CM

## 2023-01-16 DIAGNOSIS — E55.9 VITAMIN D DEFICIENCY: ICD-10-CM

## 2023-01-16 PROCEDURE — G8484 FLU IMMUNIZE NO ADMIN: HCPCS | Performed by: FAMILY MEDICINE

## 2023-01-16 PROCEDURE — 99396 PREV VISIT EST AGE 40-64: CPT | Performed by: FAMILY MEDICINE

## 2023-01-16 RX ORDER — VENLAFAXINE HYDROCHLORIDE 37.5 MG/1
CAPSULE, EXTENDED RELEASE ORAL
Qty: 90 CAPSULE | Refills: 2 | Status: SHIPPED | OUTPATIENT
Start: 2023-01-16

## 2023-01-16 RX ORDER — LEVOTHYROXINE SODIUM 0.05 MG/1
50 TABLET ORAL DAILY
Qty: 90 TABLET | Refills: 3 | Status: SHIPPED | OUTPATIENT
Start: 2023-01-16

## 2023-01-16 ASSESSMENT — ENCOUNTER SYMPTOMS
SHORTNESS OF BREATH: 0
PHOTOPHOBIA: 0
DIARRHEA: 0
BLOOD IN STOOL: 0
CONSTIPATION: 0
SORE THROAT: 0
RHINORRHEA: 0
NAUSEA: 0
WHEEZING: 0
COUGH: 0
BACK PAIN: 0
EYE REDNESS: 0
ABDOMINAL PAIN: 0
VOMITING: 0

## 2023-01-16 ASSESSMENT — PATIENT HEALTH QUESTIONNAIRE - PHQ9
3. TROUBLE FALLING OR STAYING ASLEEP: 0
2. FEELING DOWN, DEPRESSED OR HOPELESS: 0
9. THOUGHTS THAT YOU WOULD BE BETTER OFF DEAD, OR OF HURTING YOURSELF: 0
SUM OF ALL RESPONSES TO PHQ QUESTIONS 1-9: 0
10. IF YOU CHECKED OFF ANY PROBLEMS, HOW DIFFICULT HAVE THESE PROBLEMS MADE IT FOR YOU TO DO YOUR WORK, TAKE CARE OF THINGS AT HOME, OR GET ALONG WITH OTHER PEOPLE: 0
5. POOR APPETITE OR OVEREATING: 0
8. MOVING OR SPEAKING SO SLOWLY THAT OTHER PEOPLE COULD HAVE NOTICED. OR THE OPPOSITE, BEING SO FIGETY OR RESTLESS THAT YOU HAVE BEEN MOVING AROUND A LOT MORE THAN USUAL: 0
SUM OF ALL RESPONSES TO PHQ9 QUESTIONS 1 & 2: 0
1. LITTLE INTEREST OR PLEASURE IN DOING THINGS: 0
SUM OF ALL RESPONSES TO PHQ QUESTIONS 1-9: 0
4. FEELING TIRED OR HAVING LITTLE ENERGY: 0
6. FEELING BAD ABOUT YOURSELF - OR THAT YOU ARE A FAILURE OR HAVE LET YOURSELF OR YOUR FAMILY DOWN: 0
7. TROUBLE CONCENTRATING ON THINGS, SUCH AS READING THE NEWSPAPER OR WATCHING TELEVISION: 0
SUM OF ALL RESPONSES TO PHQ QUESTIONS 1-9: 0
SUM OF ALL RESPONSES TO PHQ QUESTIONS 1-9: 0

## 2023-01-16 NOTE — PROGRESS NOTES
2023    Chief Complaint   Patient presents with    Annual Exam     HPI  Arabella Mitchell (:  1965) is a 59 y.o. female, here for evaluation of the following medical concerns:    Patient is a 61-year-old female with a past medical history of hypothyroidism, hyperlipidemia, vitamin D deficiency, dysthymia, malignant neoplasm of the left breast who completed therapy with anastrozole, and diverticulosis who presents today to follow-up. Anxiety/depression: Patient on venlafaxine 37.5 mg ext release tablets well without any issue. Stable. No SI/HI. Patient reports she is stable on Synthroid 50 µg daily without any issues. Diverticulitis Flare: No recent flareups or exacerbations. Continue probiotic. And patient also advised to follow-up with her general surgeon. Recent tooth infection. Resolved. Plans for root canal specialist visit given infection in previous site. DEXA low risk. Completed COVID-19 vaccine series. Hx of COVID 2022. No need for updated COVID booster. Patient will need Pap smear and may consider shingles vaccine. Labs: CBC within normal limits. CMP with signs of slight dehydration with a creatinine 1.1. Cholesterol similar to previous with a LDL of 160. TSH normal.  T4 normal.  MND 77.    Review of Systems   Constitutional:  Negative for chills and fever. HENT:  Negative for congestion, hearing loss, postnasal drip, rhinorrhea, sneezing, sore throat and tinnitus. Eyes:  Negative for photophobia and redness. Respiratory:  Negative for cough, shortness of breath and wheezing. Cardiovascular:  Negative for chest pain, palpitations and leg swelling. Gastrointestinal:  Negative for abdominal pain, blood in stool, constipation, diarrhea, nausea and vomiting. Endocrine: Negative for polydipsia and polyuria. Genitourinary:  Negative for difficulty urinating, dysuria and hematuria. Musculoskeletal:  Negative for arthralgias and back pain. Skin:  Negative for rash. Neurological:  Negative for dizziness, weakness, light-headedness, numbness and headaches. Psychiatric/Behavioral:  The patient is not nervous/anxious. Prior to Visit Medications    Medication Sig Taking?  Authorizing Provider   venlafaxine (EFFEXOR XR) 37.5 MG extended release capsule TAKE ONE CAPSULE BY MOUTH EVERY DAY Yes Hugo Erazo MD   levothyroxine (SYNTHROID) 50 MCG tablet Take 1 tablet by mouth Daily Yes Hugo Erazo MD   Cetylpyridinium Chloride (CEPACOL MOUTHWASH/GARGLE) 0.05 % LIQD Take 5 mLs by mouth 3 times daily as needed (throat pain) Swish and spit Yes GABY Nation - CNP   Multiple Vitamins-Minerals (THERAPEUTIC MULTIVITAMIN-MINERALS) tablet Take 1 tablet by mouth daily Yes Historical Provider, MD   calcium carbonate 600 MG TABS tablet Take 1 tablet by mouth 2 times daily Yes Historical Provider, MD        No Known Allergies    Past Medical History:   Diagnosis Date    Anxiety     Blood transfusion      / ectopic pregnacy    Breast cancer ([facility] [address] 2010    left breast, treated with surgery,radiation and chemo    Diverticulosis     History of blood transfusion     History of therapeutic radiation     Hx antineoplastic chemo     Hypothyroidism     PONV (postoperative nausea and vomiting)     Pure hypercholesterolemia 2018    Thyroid disease         Menstrual History:  OB History          1    Para   1    Term   1            AB        Living             SAB        IAB        Ectopic        Molar        Multiple        Live Births                       Past Surgical History:   Procedure Laterality Date    BREAST LUMPECTOMY Left 2010    breast    COLECTOMY N/A 2022    LAPAROSCOPIC ROBOTIC XI ASSISTED LOW ANTERIOR COLON RESECTION performed by Eufemia Isaacs MD at [address] N/A 2020    COLONOSCOPY DIAGNOSTIC performed by Eufemia Isaacs MD at [address] Bilateral 2022    CYSTOSCOPY, BILATERAL URETERAL CATHETER INSERTION performed by Enmanuel Eric MD at [address] [place] [address]  4/12/2022            Family History   Problem Relation Age of Onset    Breast Cancer Mother     Heart Attack Mother     Other Father         skin cancer    Breast Cancer Paternal Grandmother     Scoliosis Daughter     Heart Disease Sister        Immunization History   Administered Date(s) Administered    COVID-19, MODERNA BLUE border, Primary or Immunocompromised, (age 12y+), IM, 100 mcg/0.5mL 12/11/2021    COVID-19, PFIZER PURPLE top, DILUTE for use, (age 15 y+), 30mcg/0.3mL 03/25/2021, 04/23/2021    Influenza Virus Vaccine 11/01/2021    Influenza, FLUARIX, FLULAVAL, FLUZONE (age 10 mo+) AND AFLURIA, (age 1 y+), PF, 0.5mL 10/09/2018, 10/31/2019    Influenza, FLUCELVAX, (age 10 mo+), MDCK, PF, 0.5mL 10/24/2020    Tdap (Boostrix, Adacel) 06/02/2019       Health Maintenance   Topic Date Due    Cervical cancer screen  Never done    Shingles vaccine (1 of 2) Never done    COVID-19 Vaccine (4 - Booster for Pfizer series) 02/05/2022    Breast cancer screen  06/16/2023    Depression Monitoring  07/06/2023    GFR test (Diabetes, CKD 3-4, OR last GFR 15-59)  01/04/2024    Colorectal Cancer Screen  05/21/2025    Lipids  01/04/2028    DTaP/Tdap/Td vaccine (2 - Td or Tdap) 06/02/2029    Flu vaccine  Completed    Hepatitis A vaccine  Aged Out    Hib vaccine  Aged Out    Meningococcal (ACWY) vaccine  Aged Out    Pneumococcal 0-64 years Vaccine  Aged Out    Hepatitis C screen  Discontinued    HIV screen  Discontinued       Social History     Socioeconomic History    Marital status:      Spouse name: Not on file    Number of children: Not on file    Years of education: Not on file    Highest education level: Not on file   Occupational History    Not on file   Tobacco Use    Smoking status: Never    Smokeless tobacco: Never   Vaping Use    Vaping Use: Never used   Substance and Sexual Activity    Alcohol use: Yes     Alcohol/week: 2.0 standard drinks     Types: 2 Glasses of wine per week     Comment: socially    Drug use: Never    Sexual activity: Not on file   Other Topics Concern    Not on file   Social History Narrative    Not on file     Social Determinants of Health     Financial Resource Strain: Not on file   Food Insecurity: Not on file   Transportation Needs: Not on file   Physical Activity: Not on file   Stress: Not on file   Social Connections: Not on file   Intimate Partner Violence: Not on file   Housing Stability: Not on file           Vitals:    01/16/23 1126   BP: 136/86   Pulse: 83   Resp: 16   Temp: 98.3 °F (36.8 °C)   TempSrc: Temporal   SpO2: 99%   Weight: 154 lb (69.9 kg)   Height: 5' 5\" (1.651 m)       Estimated body mass index is 25.63 kg/m² as calculated from the following:    Height as of this encounter: 5' 5\" (1.651 m).    Weight as of this encounter: 154 lb (69.9 kg).     Physical Exam  Vitals and nursing note reviewed.   Constitutional:       Appearance: She is well-developed.   HENT:      Head: Normocephalic.      Right Ear: Tympanic membrane and external ear normal.      Left Ear: Tympanic membrane and external ear normal.   Eyes:      Extraocular Movements: Extraocular movements intact.      Conjunctiva/sclera: Conjunctivae normal.      Pupils: Pupils are equal, round, and reactive to light.   Neck:      Trachea: Trachea normal.   Cardiovascular:      Rate and Rhythm: Normal rate and regular rhythm.      Pulses:           Radial pulses are 2+ on the right side and 2+ on the left side.        Posterior tibial pulses are 2+ on the right side and 2+ on the left side.      Heart sounds: Normal heart sounds. No murmur heard.  Pulmonary:      Effort: Pulmonary effort is normal. No respiratory distress.      Breath sounds: Normal breath sounds. No decreased breath sounds, wheezing or rales.   Abdominal:      General: Bowel sounds are normal. There is no distension.       Palpations: Abdomen is soft. Tenderness: There is no abdominal tenderness. There is no rebound. Musculoskeletal:         General: Normal range of motion. Cervical back: Neck supple. Right lower leg: No edema. Left lower leg: No edema. Skin:     General: Skin is warm and dry. Findings: No rash. Neurological:      Mental Status: She is alert and oriented to person, place, and time. Cranial Nerves: No cranial nerve deficit. Sensory: No sensory deficit. Deep Tendon Reflexes:      Reflex Scores:       Patellar reflexes are 2+ on the right side and 2+ on the left side. Psychiatric:         Mood and Affect: Mood normal.         Behavior: Behavior normal.       Patient Active Problem List    Diagnosis Date Noted    Diverticulitis of large intestine with perforation and abscess without bleeding 08/26/2022     Priority: Medium    History of low anterior resection of rectum 08/26/2022     Priority: Medium    Diverticulitis 04/07/2022    Acute diverticulitis 03/21/2020    Dysthymia 07/25/2019    Diverticulosis of large intestine without hemorrhage 07/26/2018    Pure hypercholesterolemia 07/26/2018    Vitamin D deficiency 07/26/2018    Breast cancer ([facility] [address]     Myofascial muscle pain      Overview Note:     Replacing Deprecated Diagnoses      Hypothyroidism          ASSESSMENT/PLAN:  Labs: CBC within normal limits. CMP with signs of slight dehydration with a creatinine 1.1. Cholesterol similar to previous with a LDL of 160. TSH normal.  T4 normal.  MND 77. Char Shaw was seen today for annual exam.    Diagnoses and all orders for this visit:    Well adult exam  Age-appropriate commendations reviewed and advised. Chart updated. Dysthymia  -     venlafaxine (EFFEXOR XR) 37.5 MG extended release capsule; TAKE ONE CAPSULE BY MOUTH EVERY DAY  . No issues. Patient tolerating current medication well without issues. No SI/HI.     Acquired hypothyroidism  Comments:  Stable on current dosage of synthroid. Orders:  -     levothyroxine (SYNTHROID) 50 MCG tablet; Take 1 tablet by mouth Daily  -     CBC with Auto Differential; Future  -     Comprehensive Metabolic Panel; Future  -     T4, Free; Future  -     TSH; Future    Pure hypercholesterolemia  -     CBC with Auto Differential; Future  -     Comprehensive Metabolic Panel; Future  -     Lipid Panel; Future  May be a candidate in the near future for statin therapy. Patient would like to hold off on such. Continue to work on improving lifestyle habits. Vitamin D deficiency  -     Vitamin D 25 Hydroxy; Future  Cont current dosage of OTC vitamin D. Diverticulosis of large intestine without hemorrhage  Patient has seen specialist in regards to this issue. No recent exacerbations or issues. BMI 25.0-25.9,adult  Healthy lifestyle habits advisable. Health Maintenance reviewed    Return in about 6 months (around 7/16/2023) for To Discuss Labs Results, Routine Follow-up of Chronic Conditions. Educational materials and/or home exercises printed for patient's review and were included in patient instructions on his/her After Visit Summary and given to patient at the end of visit. Counseled regarding above diagnosis, including possible risks and complications,  especially if left uncontrolled. Counseled regarding the possible side effects, risks, benefits and alternatives to treatment; patient and/or guardianverbalizes understanding, agrees, feels comfortable with and wishes to proceed with above treatment plan. Advised patient to call with any new medication issues, and read all Rx info from pharmacy to assure aware of all possible risks and side effects of medication before taking. Reviewed age and gender appropriate health screening exams and vaccinations.   Advised patient regarding importance of keeping up withrecommended health maintenance and to schedule as soon as possible if overdue, as this is important in assessing for undiagnosed pathology, especially cancer, as well as protecting against potentially harmful/lifethreatening disease. Patient and/or guardian verbalizes understanding and agrees with abovecounseling, assessment and plan. All questions answered. An  electronic signature was used to authenticatethis note.     --Eliezer Liz MD on 1/16/23 at 8:35 AM EST

## 2023-03-15 DIAGNOSIS — F34.1 DYSTHYMIA: ICD-10-CM

## 2023-03-16 RX ORDER — VENLAFAXINE HYDROCHLORIDE 37.5 MG/1
CAPSULE, EXTENDED RELEASE ORAL
Qty: 90 CAPSULE | Refills: 1 | Status: SHIPPED | OUTPATIENT
Start: 2023-03-16

## 2023-06-21 ENCOUNTER — HOSPITAL ENCOUNTER (OUTPATIENT)
Dept: GENERAL RADIOLOGY | Age: 58
Discharge: HOME | End: 2023-06-23
Payer: COMMERCIAL

## 2023-06-21 VITALS — HEIGHT: 65 IN | BODY MASS INDEX: 24.99 KG/M2 | WEIGHT: 150 LBS

## 2023-06-21 DIAGNOSIS — Z12.31 VISIT FOR SCREENING MAMMOGRAM: ICD-10-CM

## 2023-06-21 PROCEDURE — 77067 SCR MAMMO BI INCL CAD: CPT

## 2023-07-17 ENCOUNTER — OFFICE VISIT (OUTPATIENT)
Dept: PRIMARY CARE CLINIC | Age: 58
End: 2023-07-17
Payer: COMMERCIAL

## 2023-07-17 VITALS
BODY MASS INDEX: 26.33 KG/M2 | HEIGHT: 65 IN | OXYGEN SATURATION: 98 % | WEIGHT: 158 LBS | DIASTOLIC BLOOD PRESSURE: 80 MMHG | TEMPERATURE: 97.9 F | HEART RATE: 88 BPM | SYSTOLIC BLOOD PRESSURE: 132 MMHG

## 2023-07-17 DIAGNOSIS — E55.9 VITAMIN D DEFICIENCY: ICD-10-CM

## 2023-07-17 DIAGNOSIS — Z85.3 HISTORY OF BREAST CANCER: ICD-10-CM

## 2023-07-17 DIAGNOSIS — E03.9 ACQUIRED HYPOTHYROIDISM: ICD-10-CM

## 2023-07-17 DIAGNOSIS — F34.1 DYSTHYMIA: Primary | ICD-10-CM

## 2023-07-17 DIAGNOSIS — E78.00 PURE HYPERCHOLESTEROLEMIA: ICD-10-CM

## 2023-07-17 PROCEDURE — 1036F TOBACCO NON-USER: CPT | Performed by: FAMILY MEDICINE

## 2023-07-17 PROCEDURE — G8419 CALC BMI OUT NRM PARAM NOF/U: HCPCS | Performed by: FAMILY MEDICINE

## 2023-07-17 PROCEDURE — G8427 DOCREV CUR MEDS BY ELIG CLIN: HCPCS | Performed by: FAMILY MEDICINE

## 2023-07-17 PROCEDURE — 3017F COLORECTAL CA SCREEN DOC REV: CPT | Performed by: FAMILY MEDICINE

## 2023-07-17 PROCEDURE — 99213 OFFICE O/P EST LOW 20 MIN: CPT | Performed by: FAMILY MEDICINE

## 2023-07-17 RX ORDER — LEVOTHYROXINE SODIUM 0.05 MG/1
50 TABLET ORAL DAILY
Qty: 90 TABLET | Refills: 1 | Status: SHIPPED | OUTPATIENT
Start: 2023-07-17

## 2023-07-17 RX ORDER — VENLAFAXINE HYDROCHLORIDE 37.5 MG/1
CAPSULE, EXTENDED RELEASE ORAL
Qty: 90 CAPSULE | Refills: 1 | Status: SHIPPED | OUTPATIENT
Start: 2023-07-17

## 2023-07-17 SDOH — ECONOMIC STABILITY: FOOD INSECURITY: WITHIN THE PAST 12 MONTHS, YOU WORRIED THAT YOUR FOOD WOULD RUN OUT BEFORE YOU GOT MONEY TO BUY MORE.: NEVER TRUE

## 2023-07-17 SDOH — ECONOMIC STABILITY: FOOD INSECURITY: WITHIN THE PAST 12 MONTHS, THE FOOD YOU BOUGHT JUST DIDN'T LAST AND YOU DIDN'T HAVE MONEY TO GET MORE.: NEVER TRUE

## 2023-07-17 SDOH — ECONOMIC STABILITY: INCOME INSECURITY: HOW HARD IS IT FOR YOU TO PAY FOR THE VERY BASICS LIKE FOOD, HOUSING, MEDICAL CARE, AND HEATING?: NOT HARD AT ALL

## 2023-07-17 SDOH — ECONOMIC STABILITY: HOUSING INSECURITY
IN THE LAST 12 MONTHS, WAS THERE A TIME WHEN YOU DID NOT HAVE A STEADY PLACE TO SLEEP OR SLEPT IN A SHELTER (INCLUDING NOW)?: NO

## 2023-07-17 ASSESSMENT — PATIENT HEALTH QUESTIONNAIRE - PHQ9
SUM OF ALL RESPONSES TO PHQ QUESTIONS 1-9: 0
3. TROUBLE FALLING OR STAYING ASLEEP: 0
SUM OF ALL RESPONSES TO PHQ QUESTIONS 1-9: 0
5. POOR APPETITE OR OVEREATING: 0
9. THOUGHTS THAT YOU WOULD BE BETTER OFF DEAD, OR OF HURTING YOURSELF: 0
4. FEELING TIRED OR HAVING LITTLE ENERGY: 0
7. TROUBLE CONCENTRATING ON THINGS, SUCH AS READING THE NEWSPAPER OR WATCHING TELEVISION: 0
2. FEELING DOWN, DEPRESSED OR HOPELESS: 0
1. LITTLE INTEREST OR PLEASURE IN DOING THINGS: 0
SUM OF ALL RESPONSES TO PHQ9 QUESTIONS 1 & 2: 0
10. IF YOU CHECKED OFF ANY PROBLEMS, HOW DIFFICULT HAVE THESE PROBLEMS MADE IT FOR YOU TO DO YOUR WORK, TAKE CARE OF THINGS AT HOME, OR GET ALONG WITH OTHER PEOPLE: 0
SUM OF ALL RESPONSES TO PHQ QUESTIONS 1-9: 0
6. FEELING BAD ABOUT YOURSELF - OR THAT YOU ARE A FAILURE OR HAVE LET YOURSELF OR YOUR FAMILY DOWN: 0
8. MOVING OR SPEAKING SO SLOWLY THAT OTHER PEOPLE COULD HAVE NOTICED. OR THE OPPOSITE, BEING SO FIGETY OR RESTLESS THAT YOU HAVE BEEN MOVING AROUND A LOT MORE THAN USUAL: 0
SUM OF ALL RESPONSES TO PHQ QUESTIONS 1-9: 0

## 2023-07-17 ASSESSMENT — ENCOUNTER SYMPTOMS
VOMITING: 0
PHOTOPHOBIA: 0
BLOOD IN STOOL: 0
ABDOMINAL PAIN: 0
SORE THROAT: 0
DIARRHEA: 0
COUGH: 0
EYE REDNESS: 0
RHINORRHEA: 0
NAUSEA: 0
SHORTNESS OF BREATH: 0
WHEEZING: 0
CONSTIPATION: 0
BACK PAIN: 0

## 2023-07-17 NOTE — PROGRESS NOTES
Malcom Mullins (:  1965) is a 59 y.o. female,Established patient, here for evaluation of the following chief complaint(s):  New Patient and Establish Care         ASSESSMENT/PLAN:  1. Dysthymia  -     venlafaxine (EFFEXOR XR) 37.5 MG extended release capsule; TAKE 1 CAPSULE BY MOUTH ONCE DAILY, Disp-90 capsule, R-1Normal  -     CBC with Auto Differential; Future  -     Comprehensive Metabolic Panel with Bilirubin; Future  -     Lipid Panel; Future  -     TSH; Future  -     T4, Free; Future  -     Vitamin D 25 Hydroxy; Future  -     Vitamin B12 & Folate; Future  -     Urinalysis with Microscopic; Future  2. Acquired hypothyroidism  Comments:  Stable on current dosage of synthroid. Orders:  -     levothyroxine (SYNTHROID) 50 MCG tablet; Take 1 tablet by mouth Daily, Disp-90 tablet, R-1Normal  -     CBC with Auto Differential; Future  -     Comprehensive Metabolic Panel with Bilirubin; Future  -     Lipid Panel; Future  -     TSH; Future  -     T4, Free; Future  -     Vitamin D 25 Hydroxy; Future  -     Vitamin B12 & Folate; Future  -     Urinalysis with Microscopic; Future  3. History of breast cancer  4. Pure hypercholesterolemia  5. Vitamin D deficiency  We will order baseline labs and see her back in 6 months. Continue with current medication regimen in the interim pending results. Up-to-date on all issues. Recent mammogram showed no acute abnormalities. Recently saw oncology as well. Return in about 6 months (around 2024). Subjective   SUBJECTIVE/OBJECTIVE:  HPI  Patient presents today to establish with new PCP. Previous PCP recently retired from our office. Past medical history significant for history of breast cancer diagnosed in  with greater than 10-year follow-up cancer free. Also hypothyroidism and dysthymia. Patient continues to take all medication as prescribed without any side effects or adverse reactions. Denies any current issues or concerns.   Review of

## 2024-01-18 ENCOUNTER — OFFICE VISIT (OUTPATIENT)
Dept: PRIMARY CARE CLINIC | Age: 59
End: 2024-01-18
Payer: COMMERCIAL

## 2024-01-18 VITALS
WEIGHT: 164 LBS | BODY MASS INDEX: 27.32 KG/M2 | HEIGHT: 65 IN | HEART RATE: 71 BPM | SYSTOLIC BLOOD PRESSURE: 144 MMHG | DIASTOLIC BLOOD PRESSURE: 86 MMHG | OXYGEN SATURATION: 98 % | TEMPERATURE: 98 F

## 2024-01-18 DIAGNOSIS — E03.9 ACQUIRED HYPOTHYROIDISM: Primary | Chronic | ICD-10-CM

## 2024-01-18 DIAGNOSIS — Z23 NEED FOR PROPHYLACTIC VACCINATION AND INOCULATION AGAINST INFLUENZA: ICD-10-CM

## 2024-01-18 DIAGNOSIS — F34.1 DYSTHYMIA: ICD-10-CM

## 2024-01-18 PROCEDURE — 1036F TOBACCO NON-USER: CPT | Performed by: FAMILY MEDICINE

## 2024-01-18 PROCEDURE — G8482 FLU IMMUNIZE ORDER/ADMIN: HCPCS | Performed by: FAMILY MEDICINE

## 2024-01-18 PROCEDURE — 90674 CCIIV4 VAC NO PRSV 0.5 ML IM: CPT | Performed by: FAMILY MEDICINE

## 2024-01-18 PROCEDURE — 99213 OFFICE O/P EST LOW 20 MIN: CPT | Performed by: FAMILY MEDICINE

## 2024-01-18 PROCEDURE — 90471 IMMUNIZATION ADMIN: CPT | Performed by: FAMILY MEDICINE

## 2024-01-18 PROCEDURE — 3017F COLORECTAL CA SCREEN DOC REV: CPT | Performed by: FAMILY MEDICINE

## 2024-01-18 PROCEDURE — G8427 DOCREV CUR MEDS BY ELIG CLIN: HCPCS | Performed by: FAMILY MEDICINE

## 2024-01-18 PROCEDURE — G8419 CALC BMI OUT NRM PARAM NOF/U: HCPCS | Performed by: FAMILY MEDICINE

## 2024-01-18 RX ORDER — VENLAFAXINE HYDROCHLORIDE 37.5 MG/1
CAPSULE, EXTENDED RELEASE ORAL
Qty: 90 CAPSULE | Refills: 1 | Status: SHIPPED
Start: 2024-01-18 | End: 2024-01-18

## 2024-01-18 RX ORDER — VENLAFAXINE 37.5 MG/1
18.75 TABLET ORAL 2 TIMES DAILY
Qty: 30 TABLET | Refills: 3 | Status: SHIPPED | OUTPATIENT
Start: 2024-01-18

## 2024-01-18 RX ORDER — LEVOTHYROXINE SODIUM 0.05 MG/1
50 TABLET ORAL DAILY
Qty: 90 TABLET | Refills: 1 | Status: SHIPPED | OUTPATIENT
Start: 2024-01-18

## 2024-01-18 ASSESSMENT — ENCOUNTER SYMPTOMS
ABDOMINAL PAIN: 0
COUGH: 0
CONSTIPATION: 0
NAUSEA: 0
PHOTOPHOBIA: 0
VOMITING: 0
BLOOD IN STOOL: 0
DIARRHEA: 0
EYE REDNESS: 0
RHINORRHEA: 0
BACK PAIN: 0
SHORTNESS OF BREATH: 0
WHEEZING: 0
SORE THROAT: 0

## 2024-01-18 ASSESSMENT — PATIENT HEALTH QUESTIONNAIRE - PHQ9
3. TROUBLE FALLING OR STAYING ASLEEP: 0
SUM OF ALL RESPONSES TO PHQ QUESTIONS 1-9: 0
5. POOR APPETITE OR OVEREATING: 0
SUM OF ALL RESPONSES TO PHQ QUESTIONS 1-9: 0
8. MOVING OR SPEAKING SO SLOWLY THAT OTHER PEOPLE COULD HAVE NOTICED. OR THE OPPOSITE, BEING SO FIGETY OR RESTLESS THAT YOU HAVE BEEN MOVING AROUND A LOT MORE THAN USUAL: 0
SUM OF ALL RESPONSES TO PHQ QUESTIONS 1-9: 0
2. FEELING DOWN, DEPRESSED OR HOPELESS: 0
10. IF YOU CHECKED OFF ANY PROBLEMS, HOW DIFFICULT HAVE THESE PROBLEMS MADE IT FOR YOU TO DO YOUR WORK, TAKE CARE OF THINGS AT HOME, OR GET ALONG WITH OTHER PEOPLE: 0
7. TROUBLE CONCENTRATING ON THINGS, SUCH AS READING THE NEWSPAPER OR WATCHING TELEVISION: 0
4. FEELING TIRED OR HAVING LITTLE ENERGY: 0
9. THOUGHTS THAT YOU WOULD BE BETTER OFF DEAD, OR OF HURTING YOURSELF: 0
SUM OF ALL RESPONSES TO PHQ9 QUESTIONS 1 & 2: 0
1. LITTLE INTEREST OR PLEASURE IN DOING THINGS: 0
SUM OF ALL RESPONSES TO PHQ QUESTIONS 1-9: 0
6. FEELING BAD ABOUT YOURSELF - OR THAT YOU ARE A FAILURE OR HAVE LET YOURSELF OR YOUR FAMILY DOWN: 0

## 2024-01-18 NOTE — PROGRESS NOTES
icterus.     Extraocular Movements: Extraocular movements intact.      Conjunctiva/sclera: Conjunctivae normal.      Pupils: Pupils are equal, round, and reactive to light.   Neck:      Thyroid: No thyromegaly.   Cardiovascular:      Rate and Rhythm: Normal rate and regular rhythm.      Heart sounds: Normal heart sounds. No murmur heard.  Pulmonary:      Effort: Pulmonary effort is normal.      Breath sounds: Normal breath sounds. No rales.   Abdominal:      General: Bowel sounds are normal. There is no distension.      Palpations: Abdomen is soft.      Tenderness: There is no abdominal tenderness.   Musculoskeletal:         General: Normal range of motion.      Cervical back: Neck supple.      Right lower leg: No edema.      Left lower leg: No edema.   Lymphadenopathy:      Cervical: No cervical adenopathy.   Skin:     General: Skin is warm and dry.      Findings: No erythema or rash.   Neurological:      Mental Status: She is alert and oriented to person, place, and time.      Cranial Nerves: No cranial nerve deficit.   Psychiatric:         Judgment: Judgment normal.                  An electronic signature was used to authenticate this note.    --Ga Mederos, DO

## 2024-01-26 DIAGNOSIS — F34.1 DYSTHYMIA: ICD-10-CM

## 2024-01-26 DIAGNOSIS — E03.9 ACQUIRED HYPOTHYROIDISM: ICD-10-CM

## 2024-01-26 LAB
ABSOLUTE IMMATURE GRANULOCYTE: <0.03 K/UL (ref 0–0.58)
ALBUMIN SERPL-MCNC: 4.7 G/DL (ref 3.5–5.2)
ALP BLD-CCNC: 72 U/L (ref 35–104)
ALT SERPL-CCNC: 26 U/L (ref 0–32)
ANION GAP SERPL CALCULATED.3IONS-SCNC: 17 MMOL/L (ref 7–16)
AST SERPL-CCNC: 29 U/L (ref 0–31)
BACTERIA: ABNORMAL
BASOPHILS ABSOLUTE: 0.05 K/UL (ref 0–0.2)
BASOPHILS RELATIVE PERCENT: 1 % (ref 0–2)
BILIRUB SERPL-MCNC: 0.5 MG/DL (ref 0–1.2)
BILIRUBIN DIRECT: <0.2 MG/DL (ref 0–0.3)
BILIRUBIN URINE: NEGATIVE
BILIRUBIN, INDIRECT: ABNORMAL MG/DL (ref 0–1)
BUN BLDV-MCNC: 18 MG/DL (ref 6–23)
CALCIUM SERPL-MCNC: 10.1 MG/DL (ref 8.6–10.2)
CHLORIDE BLD-SCNC: 104 MMOL/L (ref 98–107)
CHOLESTEROL: 248 MG/DL
CO2: 24 MMOL/L (ref 22–29)
COLOR: YELLOW
CREAT SERPL-MCNC: 1.2 MG/DL (ref 0.5–1)
EOSINOPHILS ABSOLUTE: 0.12 K/UL (ref 0.05–0.5)
EOSINOPHILS RELATIVE PERCENT: 2 % (ref 0–6)
FOLATE: >20 NG/ML (ref 4.8–24.2)
GFR SERPL CREATININE-BSD FRML MDRD: 51 ML/MIN/1.73M2
GLUCOSE BLD-MCNC: 84 MG/DL (ref 74–99)
GLUCOSE URINE: NEGATIVE MG/DL
HCT VFR BLD CALC: 45.1 % (ref 34–48)
HDLC SERPL-MCNC: 49 MG/DL
HEMOGLOBIN: 14.6 G/DL (ref 11.5–15.5)
IMMATURE GRANULOCYTES: 0 % (ref 0–5)
KETONES, URINE: NEGATIVE MG/DL
LDL CHOLESTEROL: 167 MG/DL
LEUKOCYTE ESTERASE, URINE: NEGATIVE
LYMPHOCYTES ABSOLUTE: 1.23 K/UL (ref 1.5–4)
LYMPHOCYTES RELATIVE PERCENT: 25 % (ref 20–42)
MCH RBC QN AUTO: 28.4 PG (ref 26–35)
MCHC RBC AUTO-ENTMCNC: 32.4 G/DL (ref 32–34.5)
MCV RBC AUTO: 87.7 FL (ref 80–99.9)
MONOCYTES ABSOLUTE: 0.5 K/UL (ref 0.1–0.95)
MONOCYTES RELATIVE PERCENT: 10 % (ref 2–12)
MUCUS: PRESENT
NEUTROPHILS ABSOLUTE: 3.07 K/UL (ref 1.8–7.3)
NEUTROPHILS RELATIVE PERCENT: 62 % (ref 43–80)
NITRITE, URINE: NEGATIVE
PDW BLD-RTO: 13.9 % (ref 11.5–15)
PH UA: 6.5 (ref 5–9)
PLATELET # BLD: 285 K/UL (ref 130–450)
PMV BLD AUTO: 11.5 FL (ref 7–12)
POTASSIUM SERPL-SCNC: 4.3 MMOL/L (ref 3.5–5)
PROTEIN UA: NEGATIVE MG/DL
RBC # BLD: 5.14 M/UL (ref 3.5–5.5)
RBC UA: ABNORMAL /HPF
SODIUM BLD-SCNC: 145 MMOL/L (ref 132–146)
SPECIFIC GRAVITY UA: 1.01 (ref 1–1.03)
TOTAL PROTEIN: 7.6 G/DL (ref 6.4–8.3)
TRIGL SERPL-MCNC: 159 MG/DL
TSH SERPL DL<=0.05 MIU/L-ACNC: 2.62 UIU/ML (ref 0.27–4.2)
TURBIDITY: CLEAR
URINE HGB: NEGATIVE
UROBILINOGEN, URINE: 0.2 EU/DL (ref 0–1)
VITAMIN B-12: >2000 PG/ML (ref 211–946)
VITAMIN D 25-HYDROXY: 72.3 NG/ML (ref 30–100)
VLDLC SERPL CALC-MCNC: 32 MG/DL
WBC # BLD: 5 K/UL (ref 4.5–11.5)
WBC UA: ABNORMAL /HPF

## 2024-06-24 DIAGNOSIS — E03.9 ACQUIRED HYPOTHYROIDISM: Chronic | ICD-10-CM

## 2024-06-24 RX ORDER — LEVOTHYROXINE SODIUM 0.05 MG/1
50 TABLET ORAL DAILY
Qty: 90 TABLET | Refills: 1 | Status: SHIPPED | OUTPATIENT
Start: 2024-06-24

## 2024-07-15 ENCOUNTER — HOSPITAL ENCOUNTER (OUTPATIENT)
Dept: GENERAL RADIOLOGY | Age: 59
Discharge: HOME | End: 2024-07-17
Payer: COMMERCIAL

## 2024-07-15 VITALS — BODY MASS INDEX: 25.99 KG/M2 | HEIGHT: 65 IN | WEIGHT: 156 LBS

## 2024-07-15 DIAGNOSIS — M85.88 OSTEOPENIA OF OTHER SITE: ICD-10-CM

## 2024-07-15 DIAGNOSIS — Z12.31 VISIT FOR SCREENING MAMMOGRAM: ICD-10-CM

## 2024-07-15 PROCEDURE — 77080 DXA BONE DENSITY AXIAL: CPT

## 2024-07-15 PROCEDURE — 77063 BREAST TOMOSYNTHESIS BI: CPT

## 2024-07-18 ENCOUNTER — OFFICE VISIT (OUTPATIENT)
Dept: PRIMARY CARE CLINIC | Age: 59
End: 2024-07-18
Payer: COMMERCIAL

## 2024-07-18 VITALS
HEART RATE: 73 BPM | SYSTOLIC BLOOD PRESSURE: 130 MMHG | TEMPERATURE: 97.8 F | DIASTOLIC BLOOD PRESSURE: 64 MMHG | WEIGHT: 156 LBS | HEIGHT: 65 IN | BODY MASS INDEX: 25.99 KG/M2 | OXYGEN SATURATION: 100 %

## 2024-07-18 DIAGNOSIS — E03.9 ACQUIRED HYPOTHYROIDISM: ICD-10-CM

## 2024-07-18 DIAGNOSIS — E78.00 PURE HYPERCHOLESTEROLEMIA: ICD-10-CM

## 2024-07-18 DIAGNOSIS — E55.9 VITAMIN D DEFICIENCY: Primary | ICD-10-CM

## 2024-07-18 PROBLEM — H25.10 NUCLEAR SENILE CATARACT: Status: ACTIVE | Noted: 2023-10-31

## 2024-07-18 PROBLEM — K57.92 DIVERTICULITIS: Status: RESOLVED | Noted: 2022-04-07 | Resolved: 2024-07-18

## 2024-07-18 PROBLEM — K57.92 ACUTE DIVERTICULITIS: Status: RESOLVED | Noted: 2020-03-21 | Resolved: 2024-07-18

## 2024-07-18 PROBLEM — K57.20 DIVERTICULITIS OF LARGE INTESTINE WITH PERFORATION AND ABSCESS WITHOUT BLEEDING: Status: RESOLVED | Noted: 2022-08-26 | Resolved: 2024-07-18

## 2024-07-18 PROBLEM — F34.1 DYSTHYMIA: Status: RESOLVED | Noted: 2019-07-25 | Resolved: 2024-07-18

## 2024-07-18 PROCEDURE — G8427 DOCREV CUR MEDS BY ELIG CLIN: HCPCS | Performed by: FAMILY MEDICINE

## 2024-07-18 PROCEDURE — 1036F TOBACCO NON-USER: CPT | Performed by: FAMILY MEDICINE

## 2024-07-18 PROCEDURE — 99213 OFFICE O/P EST LOW 20 MIN: CPT | Performed by: FAMILY MEDICINE

## 2024-07-18 PROCEDURE — 3017F COLORECTAL CA SCREEN DOC REV: CPT | Performed by: FAMILY MEDICINE

## 2024-07-18 PROCEDURE — G8419 CALC BMI OUT NRM PARAM NOF/U: HCPCS | Performed by: FAMILY MEDICINE

## 2024-07-18 SDOH — ECONOMIC STABILITY: FOOD INSECURITY: WITHIN THE PAST 12 MONTHS, YOU WORRIED THAT YOUR FOOD WOULD RUN OUT BEFORE YOU GOT MONEY TO BUY MORE.: NEVER TRUE

## 2024-07-18 SDOH — ECONOMIC STABILITY: INCOME INSECURITY: HOW HARD IS IT FOR YOU TO PAY FOR THE VERY BASICS LIKE FOOD, HOUSING, MEDICAL CARE, AND HEATING?: NOT HARD AT ALL

## 2024-07-18 SDOH — ECONOMIC STABILITY: FOOD INSECURITY: WITHIN THE PAST 12 MONTHS, THE FOOD YOU BOUGHT JUST DIDN'T LAST AND YOU DIDN'T HAVE MONEY TO GET MORE.: NEVER TRUE

## 2024-07-18 ASSESSMENT — ENCOUNTER SYMPTOMS
PHOTOPHOBIA: 0
CONSTIPATION: 0
VOMITING: 0
EYE REDNESS: 0
WHEEZING: 0
SORE THROAT: 0
NAUSEA: 0
SHORTNESS OF BREATH: 0
ABDOMINAL PAIN: 0
BACK PAIN: 0
COUGH: 0
RHINORRHEA: 0
BLOOD IN STOOL: 0
DIARRHEA: 0

## 2024-07-18 NOTE — PROGRESS NOTES
Shantal Navarrete (:  1965) is a 59 y.o. female,Established patient, here for evaluation of the following chief complaint(s):  Hypothyroidism      Assessment & Plan   ASSESSMENT/PLAN:  1. Vitamin D deficiency  -     CBC with Auto Differential; Future  -     T4, Free; Future  -     Uric Acid; Future  -     TSH; Future  -     Hepatic Function Panel; Future  -     Basic Metabolic Panel; Future  -     Lipid Panel; Future  -     Hemoglobin A1C; Future  -     Urinalysis with Microscopic; Future  -     Microalbumin, Ur; Future  -     Vitamin D 25 Hydroxy; Future  2. Acquired hypothyroidism  -     CBC with Auto Differential; Future  -     T4, Free; Future  -     Uric Acid; Future  -     TSH; Future  -     Hepatic Function Panel; Future  -     Basic Metabolic Panel; Future  -     Lipid Panel; Future  -     Hemoglobin A1C; Future  -     Urinalysis with Microscopic; Future  -     Microalbumin, Ur; Future  -     Vitamin D 25 Hydroxy; Future  3. Pure hypercholesterolemia  -     CBC with Auto Differential; Future  -     T4, Free; Future  -     Uric Acid; Future  -     TSH; Future  -     Hepatic Function Panel; Future  -     Basic Metabolic Panel; Future  -     Lipid Panel; Future  -     Hemoglobin A1C; Future  -     Urinalysis with Microscopic; Future  -     Microalbumin, Ur; Future  -     Vitamin D 25 Hydroxy; Future  Patient is doing well overall.  See her back in 6 months with blood work at that time.  No other changes to regimen at this time.    Return in about 6 months (around 2025).         Subjective   SUBJECTIVE/OBJECTIVE:  HPI  Update 2024  Patient presents today for follow-up of chronic issues medication refills.  Patient states that she would like to wean off of her Effexor.  She tried before and did have the \"brain zaps.\"  Has been taking all other medications prescribed.  No other issues or concerns at this time.    Update 2024  Patient presents today for follow-up on chronic issues and

## 2024-12-22 DIAGNOSIS — E03.9 ACQUIRED HYPOTHYROIDISM: Chronic | ICD-10-CM

## 2024-12-23 RX ORDER — LEVOTHYROXINE SODIUM 50 UG/1
50 TABLET ORAL DAILY
Qty: 90 TABLET | Refills: 1 | Status: SHIPPED | OUTPATIENT
Start: 2024-12-23

## 2025-01-10 DIAGNOSIS — E55.9 VITAMIN D DEFICIENCY: ICD-10-CM

## 2025-01-10 DIAGNOSIS — E78.00 PURE HYPERCHOLESTEROLEMIA: ICD-10-CM

## 2025-01-10 DIAGNOSIS — E03.9 ACQUIRED HYPOTHYROIDISM: ICD-10-CM

## 2025-01-10 LAB
ALBUMIN: 4.5 G/DL (ref 3.5–5.2)
ALP BLD-CCNC: 66 U/L (ref 35–104)
ALT SERPL-CCNC: 19 U/L (ref 0–32)
ANION GAP SERPL CALCULATED.3IONS-SCNC: 17 MMOL/L (ref 7–16)
AST SERPL-CCNC: 24 U/L (ref 0–31)
BASOPHILS ABSOLUTE: 0.06 K/UL (ref 0–0.2)
BASOPHILS RELATIVE PERCENT: 1 % (ref 0–2)
BILIRUB SERPL-MCNC: 0.5 MG/DL (ref 0–1.2)
BILIRUBIN DIRECT: <0.2 MG/DL (ref 0–0.3)
BILIRUBIN, INDIRECT: NORMAL MG/DL (ref 0–1)
BILIRUBIN, URINE: NEGATIVE
BUN BLDV-MCNC: 21 MG/DL (ref 6–23)
CALCIUM SERPL-MCNC: 10 MG/DL (ref 8.6–10.2)
CHLORIDE BLD-SCNC: 106 MMOL/L (ref 98–107)
CHOLESTEROL, TOTAL: 242 MG/DL
CO2: 21 MMOL/L (ref 22–29)
COLOR, UA: YELLOW
CREAT SERPL-MCNC: 1 MG/DL (ref 0.5–1)
EOSINOPHILS ABSOLUTE: 0.2 K/UL (ref 0.05–0.5)
EOSINOPHILS RELATIVE PERCENT: 4 % (ref 0–6)
GFR, ESTIMATED: 63 ML/MIN/1.73M2
GLUCOSE BLD-MCNC: 95 MG/DL (ref 74–99)
GLUCOSE URINE: NEGATIVE MG/DL
HBA1C MFR BLD: 5.6 % (ref 4–5.6)
HCT VFR BLD CALC: 42.9 % (ref 34–48)
HDLC SERPL-MCNC: 48 MG/DL
HEMOGLOBIN: 13.9 G/DL (ref 11.5–15.5)
IMMATURE GRANULOCYTES %: 0 % (ref 0–5)
IMMATURE GRANULOCYTES ABSOLUTE: <0.03 K/UL (ref 0–0.58)
KETONES, URINE: NEGATIVE MG/DL
LDL CHOLESTEROL: 162 MG/DL
LEUKOCYTE ESTERASE, URINE: NEGATIVE
LYMPHOCYTES ABSOLUTE: 1.65 K/UL (ref 1.5–4)
LYMPHOCYTES RELATIVE PERCENT: 34 % (ref 20–42)
MCH RBC QN AUTO: 28.7 PG (ref 26–35)
MCHC RBC AUTO-ENTMCNC: 32.4 G/DL (ref 32–34.5)
MCV RBC AUTO: 88.5 FL (ref 80–99.9)
MONOCYTES ABSOLUTE: 0.52 K/UL (ref 0.1–0.95)
MONOCYTES RELATIVE PERCENT: 11 % (ref 2–12)
NEUTROPHILS ABSOLUTE: 2.37 K/UL (ref 1.8–7.3)
NEUTROPHILS RELATIVE PERCENT: 49 % (ref 43–80)
NITRITE, URINE: NEGATIVE
PDW BLD-RTO: 13.1 % (ref 11.5–15)
PH, URINE: 6 (ref 5–9)
PLATELET # BLD: 252 K/UL (ref 130–450)
PMV BLD AUTO: 11.5 FL (ref 7–12)
POTASSIUM SERPL-SCNC: 4.7 MMOL/L (ref 3.5–5)
PROTEIN UA: NEGATIVE MG/DL
RBC # BLD: 4.85 M/UL (ref 3.5–5.5)
RBC UA: NORMAL /HPF
SODIUM BLD-SCNC: 144 MMOL/L (ref 132–146)
SPECIFIC GRAVITY UA: 1.02 (ref 1–1.03)
T4 FREE: 1.4 NG/DL (ref 0.9–1.7)
TOTAL PROTEIN: 7.4 G/DL (ref 6.4–8.3)
TRIGL SERPL-MCNC: 162 MG/DL
TSH SERPL DL<=0.05 MIU/L-ACNC: 4.62 UIU/ML (ref 0.27–4.2)
TURBIDITY: CLEAR
URIC ACID: 4.6 MG/DL (ref 2.4–5.7)
URINE HGB: NEGATIVE
UROBILINOGEN, URINE: 0.2 EU/DL (ref 0–1)
VITAMIN D 25-HYDROXY: 68.9 NG/ML (ref 30–100)
VLDLC SERPL CALC-MCNC: 32 MG/DL
WBC # BLD: 4.8 K/UL (ref 4.5–11.5)
WBC UA: NORMAL /HPF

## 2025-01-14 SDOH — ECONOMIC STABILITY: FOOD INSECURITY: WITHIN THE PAST 12 MONTHS, THE FOOD YOU BOUGHT JUST DIDN'T LAST AND YOU DIDN'T HAVE MONEY TO GET MORE.: NEVER TRUE

## 2025-01-14 SDOH — ECONOMIC STABILITY: TRANSPORTATION INSECURITY
IN THE PAST 12 MONTHS, HAS LACK OF TRANSPORTATION KEPT YOU FROM MEETINGS, WORK, OR FROM GETTING THINGS NEEDED FOR DAILY LIVING?: NO

## 2025-01-14 SDOH — ECONOMIC STABILITY: FOOD INSECURITY: WITHIN THE PAST 12 MONTHS, YOU WORRIED THAT YOUR FOOD WOULD RUN OUT BEFORE YOU GOT MONEY TO BUY MORE.: NEVER TRUE

## 2025-01-14 SDOH — ECONOMIC STABILITY: INCOME INSECURITY: IN THE LAST 12 MONTHS, WAS THERE A TIME WHEN YOU WERE NOT ABLE TO PAY THE MORTGAGE OR RENT ON TIME?: NO

## 2025-01-14 SDOH — ECONOMIC STABILITY: TRANSPORTATION INSECURITY
IN THE PAST 12 MONTHS, HAS THE LACK OF TRANSPORTATION KEPT YOU FROM MEDICAL APPOINTMENTS OR FROM GETTING MEDICATIONS?: NO

## 2025-01-14 ASSESSMENT — PATIENT HEALTH QUESTIONNAIRE - PHQ9
5. POOR APPETITE OR OVEREATING: NOT AT ALL
1. LITTLE INTEREST OR PLEASURE IN DOING THINGS: NOT AT ALL
9. THOUGHTS THAT YOU WOULD BE BETTER OFF DEAD, OR OF HURTING YOURSELF: NOT AT ALL
7. TROUBLE CONCENTRATING ON THINGS, SUCH AS READING THE NEWSPAPER OR WATCHING TELEVISION: NOT AT ALL
5. POOR APPETITE OR OVEREATING: NOT AT ALL
6. FEELING BAD ABOUT YOURSELF - OR THAT YOU ARE A FAILURE OR HAVE LET YOURSELF OR YOUR FAMILY DOWN: NOT AT ALL
2. FEELING DOWN, DEPRESSED OR HOPELESS: NOT AT ALL
10. IF YOU CHECKED OFF ANY PROBLEMS, HOW DIFFICULT HAVE THESE PROBLEMS MADE IT FOR YOU TO DO YOUR WORK, TAKE CARE OF THINGS AT HOME, OR GET ALONG WITH OTHER PEOPLE: NOT DIFFICULT AT ALL
1. LITTLE INTEREST OR PLEASURE IN DOING THINGS: NOT AT ALL
10. IF YOU CHECKED OFF ANY PROBLEMS, HOW DIFFICULT HAVE THESE PROBLEMS MADE IT FOR YOU TO DO YOUR WORK, TAKE CARE OF THINGS AT HOME, OR GET ALONG WITH OTHER PEOPLE: NOT DIFFICULT AT ALL
SUM OF ALL RESPONSES TO PHQ QUESTIONS 1-9: 2
3. TROUBLE FALLING OR STAYING ASLEEP: SEVERAL DAYS
4. FEELING TIRED OR HAVING LITTLE ENERGY: SEVERAL DAYS
8. MOVING OR SPEAKING SO SLOWLY THAT OTHER PEOPLE COULD HAVE NOTICED. OR THE OPPOSITE, BEING SO FIGETY OR RESTLESS THAT YOU HAVE BEEN MOVING AROUND A LOT MORE THAN USUAL: NOT AT ALL
4. FEELING TIRED OR HAVING LITTLE ENERGY: SEVERAL DAYS
SUM OF ALL RESPONSES TO PHQ QUESTIONS 1-9: 2
8. MOVING OR SPEAKING SO SLOWLY THAT OTHER PEOPLE COULD HAVE NOTICED. OR THE OPPOSITE - BEING SO FIDGETY OR RESTLESS THAT YOU HAVE BEEN MOVING AROUND A LOT MORE THAN USUAL: NOT AT ALL
SUM OF ALL RESPONSES TO PHQ QUESTIONS 1-9: 2
3. TROUBLE FALLING OR STAYING ASLEEP: SEVERAL DAYS
9. THOUGHTS THAT YOU WOULD BE BETTER OFF DEAD, OR OF HURTING YOURSELF: NOT AT ALL
7. TROUBLE CONCENTRATING ON THINGS, SUCH AS READING THE NEWSPAPER OR WATCHING TELEVISION: NOT AT ALL
SUM OF ALL RESPONSES TO PHQ QUESTIONS 1-9: 2
6. FEELING BAD ABOUT YOURSELF - OR THAT YOU ARE A FAILURE OR HAVE LET YOURSELF OR YOUR FAMILY DOWN: NOT AT ALL
SUM OF ALL RESPONSES TO PHQ9 QUESTIONS 1 & 2: 0
2. FEELING DOWN, DEPRESSED OR HOPELESS: NOT AT ALL
SUM OF ALL RESPONSES TO PHQ QUESTIONS 1-9: 2

## 2025-01-16 ENCOUNTER — OFFICE VISIT (OUTPATIENT)
Dept: PRIMARY CARE CLINIC | Age: 60
End: 2025-01-16
Payer: COMMERCIAL

## 2025-01-16 VITALS
RESPIRATION RATE: 16 BRPM | HEART RATE: 83 BPM | OXYGEN SATURATION: 98 % | HEIGHT: 65 IN | DIASTOLIC BLOOD PRESSURE: 80 MMHG | SYSTOLIC BLOOD PRESSURE: 130 MMHG | WEIGHT: 165 LBS | BODY MASS INDEX: 27.49 KG/M2

## 2025-01-16 DIAGNOSIS — E78.00 PURE HYPERCHOLESTEROLEMIA: ICD-10-CM

## 2025-01-16 DIAGNOSIS — E55.9 VITAMIN D DEFICIENCY: ICD-10-CM

## 2025-01-16 DIAGNOSIS — E03.9 ACQUIRED HYPOTHYROIDISM: Primary | ICD-10-CM

## 2025-01-16 PROCEDURE — 3017F COLORECTAL CA SCREEN DOC REV: CPT | Performed by: FAMILY MEDICINE

## 2025-01-16 PROCEDURE — G8427 DOCREV CUR MEDS BY ELIG CLIN: HCPCS | Performed by: FAMILY MEDICINE

## 2025-01-16 PROCEDURE — G8419 CALC BMI OUT NRM PARAM NOF/U: HCPCS | Performed by: FAMILY MEDICINE

## 2025-01-16 PROCEDURE — 99214 OFFICE O/P EST MOD 30 MIN: CPT | Performed by: FAMILY MEDICINE

## 2025-01-16 PROCEDURE — 1036F TOBACCO NON-USER: CPT | Performed by: FAMILY MEDICINE

## 2025-01-16 RX ORDER — LEVOTHYROXINE SODIUM 75 UG/1
75 TABLET ORAL DAILY
Qty: 30 TABLET | Refills: 3 | Status: SHIPPED | OUTPATIENT
Start: 2025-01-16

## 2025-01-16 ASSESSMENT — ENCOUNTER SYMPTOMS
BACK PAIN: 0
SHORTNESS OF BREATH: 0
RHINORRHEA: 0
BLOOD IN STOOL: 0
SORE THROAT: 0
EYE REDNESS: 0
ABDOMINAL PAIN: 0
COUGH: 0
CONSTIPATION: 0
VOMITING: 0
PHOTOPHOBIA: 0
NAUSEA: 0
DIARRHEA: 0
WHEEZING: 0

## 2025-01-16 NOTE — PROGRESS NOTES
CATHETER INSERTION performed by Gentry Briones MD at [facility] OR    ECTOPIC PREGNANCY SURGERY  1981    PICC LINE INSERTION NURSE  4/12/2022          Social History     Socioeconomic History    Marital status:      Spouse name: Not on file    Number of children: Not on file    Years of education: Not on file    Highest education level: Not on file   Occupational History    Not on file   Tobacco Use    Smoking status: Never    Smokeless tobacco: Never   Vaping Use    Vaping status: Never Used   Substance and Sexual Activity    Alcohol use: Yes     Alcohol/week: 2.0 standard drinks of alcohol     Types: 2 Glasses of wine per week     Comment: socially    Drug use: Never    Sexual activity: Not on file   Other Topics Concern    Not on file   Social History Narrative    Not on file     Social Determinants of Health     Financial Resource Strain: Low Risk  (7/18/2024)    Overall Financial Resource Strain (CARDIA)     Difficulty of Paying Living Expenses: Not hard at all   Food Insecurity: No Food Insecurity (1/14/2025)    Hunger Vital Sign     Worried About Running Out of Food in the Last Year: Never true     Ran Out of Food in the Last Year: Never true   Transportation Needs: No Transportation Needs (1/14/2025)    PRAPARE - Transportation     Lack of Transportation (Medical): No     Lack of Transportation (Non-Medical): No   Physical Activity: Not on file   Stress: Not on file   Social Connections: Not on file   Intimate Partner Violence: Not on file   Housing Stability: Low Risk  (1/14/2025)    Housing Stability Vital Sign     Unable to Pay for Housing in the Last Year: No     Number of Times Moved in the Last Year: 0     Homeless in the Last Year: No     Family History   Problem Relation Age of Onset    Breast Cancer Mother 24    Heart Attack Mother     Other Father         skin cancer    Heart Disease Sister     Scoliosis Daughter     Breast Cancer Paternal Grandmother 69      There are no preventive care reminders

## 2025-01-16 NOTE — ASSESSMENT & PLAN NOTE
Will increase to 75 mcg daily.  Repeat labs in 6 weeks.  See her back in 6 months or sooner if needed.    Orders:    levothyroxine (SYNTHROID) 75 MCG tablet; Take 1 tablet by mouth Daily    TSH; Future    T4, Free; Future

## 2025-01-16 NOTE — ASSESSMENT & PLAN NOTE
Cholesterol still elevated.  May be related to hypothyroidism.  Advised low-fat/cholesterol diet and weight loss in addition to increasing Synthroid level.

## 2025-02-21 DIAGNOSIS — E03.9 ACQUIRED HYPOTHYROIDISM: ICD-10-CM

## 2025-02-21 RX ORDER — LEVOTHYROXINE SODIUM 75 UG/1
75 TABLET ORAL DAILY
Qty: 90 TABLET | Refills: 3 | Status: SHIPPED | OUTPATIENT
Start: 2025-02-21

## 2025-07-16 ENCOUNTER — OFFICE VISIT (OUTPATIENT)
Dept: PRIMARY CARE CLINIC | Age: 60
End: 2025-07-16
Payer: COMMERCIAL

## 2025-07-16 VITALS
BODY MASS INDEX: 25.83 KG/M2 | HEIGHT: 65 IN | TEMPERATURE: 97 F | OXYGEN SATURATION: 98 % | HEART RATE: 78 BPM | SYSTOLIC BLOOD PRESSURE: 118 MMHG | WEIGHT: 155 LBS | DIASTOLIC BLOOD PRESSURE: 72 MMHG

## 2025-07-16 DIAGNOSIS — Z00.01 ENCOUNTER FOR WELL ADULT EXAM WITH ABNORMAL FINDINGS: Primary | ICD-10-CM

## 2025-07-16 PROCEDURE — 99396 PREV VISIT EST AGE 40-64: CPT | Performed by: FAMILY MEDICINE

## 2025-07-16 ASSESSMENT — ENCOUNTER SYMPTOMS
BACK PAIN: 0
PHOTOPHOBIA: 0
VOMITING: 0
NAUSEA: 0
CONSTIPATION: 0
SORE THROAT: 0
EYE REDNESS: 0
BLOOD IN STOOL: 0
COUGH: 0
WHEEZING: 0
RHINORRHEA: 0
DIARRHEA: 0
ABDOMINAL PAIN: 0
SHORTNESS OF BREATH: 0

## 2025-07-16 NOTE — PROGRESS NOTES
Well Adult Note  Name: Shantal Navarrete Today’s Date: 2025   MRN: [id number] Sex: Female   Age: 60 y.o. Ethnicity: Non- / Non    : 1965 Race: White (non-)      Shantal Navarrete is here for a well adult exam.       Assessment & Plan   Encounter for well adult exam with abnormal findings  -     CBC with Auto Differential; Future  -     T4, Free; Future  -     Uric Acid; Future  -     TSH; Future  -     Hepatic Function Panel; Future  -     Basic Metabolic Panel; Future  -     Lipid Panel; Future  -     Hemoglobin A1C; Future  -     Urinalysis with Microscopic; Future  -     Vitamin D 25 Hydroxy; Future   Continue with current medication regimen.  Labs prior to next visit.  Has mammogram scheduled at [facility] upcoming.  Advised her to contact Dr. Fierro for colonoscopy repeat.  Return in about 6 months (around 2026).       Subjective   History:  Patient presents today for yearly physical exam.  Doing well overall.  Main concern today has been some intermittent issues with forgetfulness.  Occasionally losing her phone or forgetting appointments.  No health or safety concerns at this time.  Health maintenance issues as noted above.    Review of Systems   Constitutional:  Positive for fatigue and unexpected weight change. Negative for chills and fever.   HENT:  Negative for congestion, hearing loss, postnasal drip, rhinorrhea, sneezing, sore throat and tinnitus.    Eyes:  Negative for photophobia and redness.   Respiratory:  Negative for cough, shortness of breath and wheezing.    Cardiovascular:  Negative for chest pain, palpitations and leg swelling.   Gastrointestinal:  Negative for abdominal pain, blood in stool, constipation, diarrhea, nausea and vomiting.   Endocrine: Negative for polydipsia and polyuria.   Genitourinary:  Negative for difficulty urinating, dysuria and hematuria.   Musculoskeletal:  Negative for arthralgias and back pain.   Skin:  Negative for rash.

## 2025-07-29 ENCOUNTER — HOSPITAL ENCOUNTER (OUTPATIENT)
Dept: GENERAL RADIOLOGY | Age: 60
Discharge: HOME | End: 2025-07-31
Payer: COMMERCIAL

## 2025-07-29 DIAGNOSIS — Z12.31 VISIT FOR SCREENING MAMMOGRAM: ICD-10-CM

## 2025-07-29 PROCEDURE — 77067 SCR MAMMO BI INCL CAD: CPT

## (undated) DEVICE — COLUMN DRAPE

## (undated) DEVICE — STAPLER EXT 65MM S STL AUTO DISP PURSTRING

## (undated) DEVICE — GAUZE,SPONGE,4"X4",8PLY,STRL,LF,10/TRAY: Brand: MEDLINE

## (undated) DEVICE — GOWN,SIRUS,FABRNF,XL,20/CS: Brand: MEDLINE

## (undated) DEVICE — STAPLER 60: Brand: SUREFORM

## (undated) DEVICE — DOUBLE BASIN SET: Brand: MEDLINE INDUSTRIES, INC.

## (undated) DEVICE — KIT,ANTI FOG,W/SPONGE & FLUID,SOFT PACK: Brand: MEDLINE

## (undated) DEVICE — PACK PROCEDURE SURG GEN CUST

## (undated) DEVICE — SEAL

## (undated) DEVICE — SOLUTION SURG PREP ANTIMICROBIAL 4 OZ SKIN WND EXIDINE

## (undated) DEVICE — INSUFFLATION TUBING SET WITH FILTER, FUNNEL CONNECTOR AND LUER LOCK: Brand: JOSNOE MEDICAL INC

## (undated) DEVICE — Z DUP USE 2431569 PATIENT POSITIONING KIT SURGYPAD

## (undated) DEVICE — TISSUE RETRIEVAL SYSTEM: Brand: INZII RETRIEVAL SYSTEM

## (undated) DEVICE — TOTAL TRAY, 16FR 10ML SIL FOLEY, URN: Brand: MEDLINE

## (undated) DEVICE — GOWN,SIRUS,FABRNF,2XL,18/CS: Brand: MEDLINE

## (undated) DEVICE — SKIN PREP TRAY 4 COMPARTM TRAY: Brand: MEDLINE INDUSTRIES, INC.

## (undated) DEVICE — SOLUTION IV IRRIG WATER 1000ML POUR BRL 2F7114

## (undated) DEVICE — CADIERE FORCEPS: Brand: ENDOWRIST

## (undated) DEVICE — WARMER SCP 2 ANTIFOG LAP DISP

## (undated) DEVICE — SYRINGE IRRIG 60ML SFT PLIABLE BLB EZ TO GRP 1 HND USE W/

## (undated) DEVICE — GLOVE ORANGE PI 8   MSG9080

## (undated) DEVICE — TOWEL,OR,DSP,ST,BLUE,STD,6/PK,12PK/CS: Brand: MEDLINE

## (undated) DEVICE — INTENDED FOR TISSUE SEPARATION, AND OTHER PROCEDURES THAT REQUIRE A SHARP SURGICAL BLADE TO PUNCTURE OR CUT.: Brand: BARD-PARKER ® STAINLESS STEEL BLADES

## (undated) DEVICE — NEEDLE CLOSURE OMNICLOSE

## (undated) DEVICE — INSUFFLATION NEEDLE TO ESTABLISH PNEUMOPERITONEUM.: Brand: INSUFFLATION NEEDLE

## (undated) DEVICE — GRADUATE TRIANG MEASURE 1000ML BLK PRNT

## (undated) DEVICE — DRAPE,LAP,CHOLE,W/TROUGHS,STERILE: Brand: MEDLINE

## (undated) DEVICE — TIP-UP FENESTRATED GRASPER: Brand: ENDOWRIST

## (undated) DEVICE — BLADE CLIPPER GEN PURP NS

## (undated) DEVICE — STAPLER INT DIA29MM CLS STPL H1.5-2.2MM OPN LEG L5.2MM 26

## (undated) DEVICE — BLADELESS OBTURATOR: Brand: WECK VISTA

## (undated) DEVICE — TOWEL,OR,DSP,ST,GREEN,DLX,XR,4/PK,20PK/C: Brand: MEDLINE

## (undated) DEVICE — ARM DRAPE

## (undated) DEVICE — FLUORESCENCE IMAGING PROCEDURE KIT: Brand: FIREFLY

## (undated) DEVICE — CATHETER URET 5FR L70CM OPN END SGL LUMN INJ HUB FLEXIMA

## (undated) DEVICE — TROCAR: Brand: KII SHIELDED BLADED ACCESS SYSTEM

## (undated) DEVICE — VESSEL SEALER EXTEND: Brand: ENDOWRIST

## (undated) DEVICE — STAPLER 60 RELOAD BLUE: Brand: SUREFORM

## (undated) DEVICE — CONNECTOR CATH URET SIDE PRT FOR FRIC CONN UCA595] GU TEK]

## (undated) DEVICE — SPONGE GZ W4XL4IN RAYON POLY FILL CVR W/ NONWOVEN FAB

## (undated) DEVICE — REDUCER: Brand: ENDOWRIST

## (undated) DEVICE — TIP COVER ACCESSORY

## (undated) DEVICE — CYSTO PACK: Brand: MEDLINE INDUSTRIES, INC.

## (undated) DEVICE — CANNULA SEAL

## (undated) DEVICE — MEDICINE CUP, GRADUATED, STER: Brand: MEDLINE

## (undated) DEVICE — BLADE ES ELASTOMERIC COAT INSUL DURABLE BEND UPTO 90DEG

## (undated) DEVICE — ELECTRODE PT RET AD L9FT HI MOIST COND ADH HYDRGEL CORDED

## (undated) DEVICE — APPLICATOR PREP 26ML 0.7% IOD POVACRYLEX 74% ISO ALC ST

## (undated) DEVICE — SUCTION IRRIGATOR: Brand: ENDOWRIST